# Patient Record
Sex: FEMALE | Race: WHITE | Employment: OTHER | URBAN - METROPOLITAN AREA
[De-identification: names, ages, dates, MRNs, and addresses within clinical notes are randomized per-mention and may not be internally consistent; named-entity substitution may affect disease eponyms.]

---

## 2017-08-28 ENCOUNTER — OFFICE VISIT (OUTPATIENT)
Dept: ORTHOPEDIC SURGERY | Age: 82
End: 2017-08-28

## 2017-08-28 VITALS
SYSTOLIC BLOOD PRESSURE: 129 MMHG | DIASTOLIC BLOOD PRESSURE: 71 MMHG | HEART RATE: 56 BPM | WEIGHT: 184 LBS | BODY MASS INDEX: 33.86 KG/M2 | HEIGHT: 62 IN

## 2017-08-28 DIAGNOSIS — M54.50 LUMBAR SPINE PAIN: Primary | ICD-10-CM

## 2017-08-28 DIAGNOSIS — M70.61 TROCHANTERIC BURSITIS OF RIGHT HIP: ICD-10-CM

## 2017-08-28 PROCEDURE — 99213 OFFICE O/P EST LOW 20 MIN: CPT | Performed by: ORTHOPAEDIC SURGERY

## 2017-08-28 PROCEDURE — 72110 X-RAY EXAM L-2 SPINE 4/>VWS: CPT | Performed by: ORTHOPAEDIC SURGERY

## 2017-08-28 RX ORDER — METHYLPREDNISOLONE 4 MG/1
TABLET ORAL
Qty: 1 KIT | Refills: 0 | Status: SHIPPED | OUTPATIENT
Start: 2017-08-28 | End: 2017-09-03

## 2017-09-01 ENCOUNTER — HOSPITAL ENCOUNTER (OUTPATIENT)
Dept: PHYSICAL THERAPY | Age: 82
Discharge: HOME | End: 2017-09-30
Attending: ORTHOPAEDIC SURGERY | Admitting: ORTHOPAEDIC SURGERY

## 2017-09-06 ENCOUNTER — HOSPITAL ENCOUNTER (OUTPATIENT)
Dept: PHYSICAL THERAPY | Age: 82
Discharge: HOME | End: 2017-09-06
Admitting: ORTHOPAEDIC SURGERY

## 2017-09-06 ENCOUNTER — HOSPITAL ENCOUNTER (OUTPATIENT)
Dept: PHYSICAL THERAPY | Age: 82
Discharge: HOME | End: 2017-08-31
Admitting: ORTHOPAEDIC SURGERY

## 2017-09-13 ENCOUNTER — HOSPITAL ENCOUNTER (OUTPATIENT)
Dept: PHYSICAL THERAPY | Age: 82
Discharge: HOME | End: 2017-09-13
Admitting: ORTHOPAEDIC SURGERY

## 2017-09-20 ENCOUNTER — HOSPITAL ENCOUNTER (OUTPATIENT)
Dept: PHYSICAL THERAPY | Age: 82
Discharge: HOME | End: 2017-09-20
Admitting: ORTHOPAEDIC SURGERY

## 2017-09-27 ENCOUNTER — HOSPITAL ENCOUNTER (OUTPATIENT)
Dept: PHYSICAL THERAPY | Age: 82
Discharge: HOME | End: 2017-09-27
Admitting: ORTHOPAEDIC SURGERY

## 2017-10-11 ENCOUNTER — HOSPITAL ENCOUNTER (OUTPATIENT)
Dept: SURGERY | Age: 82
Discharge: HOME | End: 2017-10-11
Attending: OPHTHALMOLOGY | Admitting: OPHTHALMOLOGY

## 2017-10-11 VITALS
HEART RATE: 63 BPM | OXYGEN SATURATION: 92 % | SYSTOLIC BLOOD PRESSURE: 162 MMHG | BODY MASS INDEX: 34.23 KG/M2 | TEMPERATURE: 97.9 F | WEIGHT: 186 LBS | HEIGHT: 62 IN | RESPIRATION RATE: 16 BRPM | DIASTOLIC BLOOD PRESSURE: 83 MMHG

## 2017-10-11 RX ORDER — MORPHINE SULFATE 2 MG/ML
2 INJECTION, SOLUTION INTRAMUSCULAR; INTRAVENOUS EVERY 5 MIN PRN
Status: DISCONTINUED | OUTPATIENT
Start: 2017-10-11 | End: 2017-10-12 | Stop reason: HOSPADM

## 2017-10-11 RX ORDER — LIDOCAINE HYDROCHLORIDE 10 MG/ML
0.3 INJECTION, SOLUTION EPIDURAL; INFILTRATION; INTRACAUDAL; PERINEURAL
Status: ACTIVE | OUTPATIENT
Start: 2017-10-11 | End: 2017-10-11

## 2017-10-11 RX ORDER — SODIUM CHLORIDE 0.9 % (FLUSH) 0.9 %
10 SYRINGE (ML) INJECTION PRN
Status: DISCONTINUED | OUTPATIENT
Start: 2017-10-11 | End: 2017-10-12 | Stop reason: HOSPADM

## 2017-10-11 RX ORDER — SODIUM CHLORIDE 0.9 % (FLUSH) 0.9 %
10 SYRINGE (ML) INJECTION EVERY 12 HOURS SCHEDULED
Status: DISCONTINUED | OUTPATIENT
Start: 2017-10-11 | End: 2017-10-12 | Stop reason: HOSPADM

## 2017-10-11 RX ORDER — LABETALOL HYDROCHLORIDE 5 MG/ML
5 INJECTION, SOLUTION INTRAVENOUS EVERY 10 MIN PRN
Status: DISCONTINUED | OUTPATIENT
Start: 2017-10-11 | End: 2017-10-12 | Stop reason: HOSPADM

## 2017-10-11 RX ORDER — DIPHENHYDRAMINE HYDROCHLORIDE 50 MG/ML
12.5 INJECTION INTRAMUSCULAR; INTRAVENOUS
Status: ACTIVE | OUTPATIENT
Start: 2017-10-11 | End: 2017-10-11

## 2017-10-11 RX ORDER — MORPHINE SULFATE 2 MG/ML
1 INJECTION, SOLUTION INTRAMUSCULAR; INTRAVENOUS EVERY 5 MIN PRN
Status: DISCONTINUED | OUTPATIENT
Start: 2017-10-11 | End: 2017-10-12 | Stop reason: HOSPADM

## 2017-10-11 RX ORDER — MEPERIDINE HYDROCHLORIDE 50 MG/ML
12.5 INJECTION INTRAMUSCULAR; INTRAVENOUS; SUBCUTANEOUS EVERY 5 MIN PRN
Status: DISCONTINUED | OUTPATIENT
Start: 2017-10-11 | End: 2017-10-12 | Stop reason: HOSPADM

## 2017-10-11 RX ORDER — SODIUM CHLORIDE, SODIUM LACTATE, POTASSIUM CHLORIDE, CALCIUM CHLORIDE 600; 310; 30; 20 MG/100ML; MG/100ML; MG/100ML; MG/100ML
INJECTION, SOLUTION INTRAVENOUS CONTINUOUS
Status: DISCONTINUED | OUTPATIENT
Start: 2017-10-11 | End: 2017-10-12 | Stop reason: HOSPADM

## 2017-10-11 RX ORDER — OXYCODONE HYDROCHLORIDE AND ACETAMINOPHEN 5; 325 MG/1; MG/1
2 TABLET ORAL PRN
Status: ACTIVE | OUTPATIENT
Start: 2017-10-11 | End: 2017-10-11

## 2017-10-11 RX ORDER — HYDRALAZINE HYDROCHLORIDE 20 MG/ML
5 INJECTION INTRAMUSCULAR; INTRAVENOUS EVERY 10 MIN PRN
Status: DISCONTINUED | OUTPATIENT
Start: 2017-10-11 | End: 2017-10-12 | Stop reason: HOSPADM

## 2017-10-11 RX ORDER — PROMETHAZINE HYDROCHLORIDE 25 MG/ML
6.25 INJECTION, SOLUTION INTRAMUSCULAR; INTRAVENOUS
Status: ACTIVE | OUTPATIENT
Start: 2017-10-11 | End: 2017-10-11

## 2017-10-11 RX ORDER — OXYCODONE HYDROCHLORIDE AND ACETAMINOPHEN 5; 325 MG/1; MG/1
1 TABLET ORAL PRN
Status: ACTIVE | OUTPATIENT
Start: 2017-10-11 | End: 2017-10-11

## 2017-10-11 RX ORDER — ONDANSETRON 2 MG/ML
4 INJECTION INTRAMUSCULAR; INTRAVENOUS
Status: ACTIVE | OUTPATIENT
Start: 2017-10-11 | End: 2017-10-11

## 2017-10-11 RX ADMIN — SODIUM CHLORIDE, SODIUM LACTATE, POTASSIUM CHLORIDE, CALCIUM CHLORIDE: 600; 310; 30; 20 INJECTION, SOLUTION INTRAVENOUS at 10:30

## 2017-10-11 ASSESSMENT — ENCOUNTER SYMPTOMS: SHORTNESS OF BREATH: 1

## 2017-10-11 ASSESSMENT — PAIN - FUNCTIONAL ASSESSMENT: PAIN_FUNCTIONAL_ASSESSMENT: 0-10

## 2017-10-11 ASSESSMENT — PAIN SCALES - GENERAL: PAINLEVEL_OUTOF10: 0

## 2017-10-11 NOTE — BRIEF OP NOTE
Brief Postoperative Note    Jay Carpenter  YOB: 1930  [id number]    Pre-operative Diagnosis: CATARACT OD    Post-operative Diagnosis: Same    Procedure: PHACO OD WITH IOL    Anesthesia: MAC    Surgeons/Assistants: Nida Officer MD    Estimated Blood Loss: less than 50     Complications: None    Specimens: Was Not Obtained    Findings: NONE    Electronically signed by Helena Salgado MD on 10/11/2017 at 12:09 PM

## 2017-10-11 NOTE — H&P
I have examined the patient and reviewed the history and physical and find no relevant changes. I have reviewed with the patient and/or family the risks, benefits, and alternatives to the procedure and they have agreed to proceed.     Marcela Martinez.

## 2017-10-11 NOTE — ANESTHESIA PRE-OP
(ZOFRAN ODT) 4 MG disintegrating tablet Take 1 tablet by mouth every 8 hours as needed for Nausea or Vomiting 10/18/16   Guy Kee NP   nitroGLYCERIN (NITROSTAT) 0.4 MG SL tablet Place 0.4 mg under the tongue every 5 minutes as needed (never  used). Historical Provider, MD   meclizine (ANTIVERT) 25 MG CHEW Take 25 mg by mouth 3 times daily as needed (not used recently). Historical Provider, MD       Current medications:    Current Outpatient Prescriptions   Medication Sig Dispense Refill    Cholecalciferol (VITAMIN D) 2000 units CAPS capsule Take by mouth Daily      cefUROXime (CEFTIN) 250 MG tablet Take 250 mg by mouth 2 times daily For UTI      atorvastatin (LIPITOR) 20 MG tablet Take 20 mg by mouth daily      carvedilol (COREG) 6.25 MG tablet Take 6.25 mg by mouth 2 times daily (with meals)      valsartan (DIOVAN) 40 MG tablet Take 40 mg by mouth daily      Probiotic Product (DIGESTIVE ADVANTAGE) CAPS Take 1 capsule by mouth daily      Cyanocobalamin (VITAMIN B 12 PO) Take 1,000 Units by mouth daily       furosemide (LASIX) 20 MG tablet Take 20 mg by mouth daily      allopurinol (ZYLOPRIM) 100 MG tablet Take 100 mg by mouth daily      docusate sodium (COLACE) 100 MG capsule Take 100 mg by mouth 2 times daily      spironolactone (ALDACTONE) 25 MG tablet Take 25 mg by mouth daily.  diazepam (VALIUM) 5 MG tablet Take 5 mg by mouth every 8 hours as needed for Anxiety (never used)       levothyroxine (SYNTHROID) 100 MCG tablet Take 100 mcg by mouth daily.  aspirin 81 MG EC tablet Take 81 mg by mouth daily as needed (stopped 7 days prior to surgery).  estradiol (ESTRACE) 0.1 MG/GM vaginal cream Place 1 g vaginally Twice a Week       ondansetron (ZOFRAN ODT) 4 MG disintegrating tablet Take 1 tablet by mouth every 8 hours as needed for Nausea or Vomiting 10 tablet 0    nitroGLYCERIN (NITROSTAT) 0.4 MG SL tablet Place 0.4 mg under the tongue every 5 minutes as needed (never  used). History:        Procedure Laterality Date    APPENDECTOMY      BREAST SURGERY      biopsy benign    CARDIAC SURGERY  2007    stent    COLONOSCOPY  4/29/2013    HYSTERECTOMY      JOINT REPLACEMENT Left 8/13/14    LEFT TOTAL KNEE REPLACEMENT WITH FEMORAL NERVE BLOCK FOR PAIN    OTHER SURGICAL HISTORY Right 1/6/14    right total knee replacement    TONSILLECTOMY         Social History:    Social History   Substance Use Topics    Smoking status: Former Smoker     Packs/day: 0.50     Years: 40.00     Types: Cigarettes     Quit date: 7/29/2000    Smokeless tobacco: Never Used      Comment: less than pack day    Alcohol use No                                Counseling given: Not Answered      Vital Signs (Current):   Vitals:    10/11/17 1026   BP: (!) 140/59   Pulse: 60   Resp: 18   Temp: 97.8 °F (36.6 °C)   TempSrc: Temporal   SpO2: 95%   Weight: 186 lb (84.4 kg)   Height: 5' 2\" (1.575 m)                                              BP Readings from Last 3 Encounters:   10/11/17 (!) 140/59   08/28/17 129/71   12/09/16 (!) 152/83       NPO Status: Time of last liquid consumption: 2100                        Time of last solid consumption: 2000                        Date of last liquid consumption: 10/10/17                        Date of last solid food consumption: 10/10/17    BMI:   Wt Readings from Last 3 Encounters:   10/11/17 186 lb (84.4 kg)   10/06/17 184 lb (83.5 kg)   08/28/17 184 lb (83.5 kg)     Body mass index is 34.02 kg/m².     Anesthesia Evaluation    Airway: Mallampati: III  TM distance: <3 FB   Neck ROM: limited  Mouth opening: > = 3 FB Dental: normal exam         Pulmonary:   (+) shortness of breath:                             Cardiovascular:    (+) hypertension:, past MI: > 6 months and no interval change, CAD:, CHF:, KITCHEN:,     (-)  angina       Beta Blocker:  Dose within 24 Hrs         Neuro/Psych:               GI/Hepatic/Renal: neg ROS            Endo/Other:    (+) hypothyroidism::.                 Abdominal:           Vascular:                                      Anesthesia Plan      general     ASA 3     (Risks, benefits and alternatives of GA discussed with pt. Questions answered. Willing to proceed.)  Induction: intravenous. Anesthetic plan and risks discussed with patient.                       Ahsan Lovelace MD   10/11/2017

## 2017-10-11 NOTE — ANESTHESIA POST-OP
Postoperative Anesthesia Note    Name:    Burns Riedel  MRN:      [id number]    Patient Vitals for the past 12 hrs:   BP Temp Temp src Pulse Resp SpO2 Height Weight   10/11/17 1026 (!) 140/59 97.8 °F (36.6 °C) Temporal 60 18 95 % 5' 2\" (1.575 m) 186 lb (84.4 kg)        LABS:    CBC  Lab Results   Component Value Date/Time    WBC 6.9 12/08/2016 06:05 PM    HGB 11.3 (L) 12/08/2016 06:05 PM    HCT 34.6 (L) 12/08/2016 06:05 PM     12/08/2016 06:05 PM     RENAL  Lab Results   Component Value Date/Time     12/08/2016 06:05 PM    K 4.3 12/08/2016 06:05 PM     12/08/2016 06:05 PM    CO2 23 12/08/2016 06:05 PM    BUN 30 (H) 12/08/2016 06:05 PM    CREATININE 1.6 (H) 12/08/2016 06:05 PM    GLUCOSE 101 (H) 12/08/2016 06:05 PM     COAGS  Lab Results   Component Value Date/Time    PROTIME 11.8 10/18/2016 04:00 PM    INR 1.03 10/18/2016 04:00 PM    APTT 30.9 10/18/2016 04:00 PM       Intake & Output:  No intake/output data recorded. Nausea & Vomiting:  No    Level of Consciousness:  Awake    Pain Assessment:  Adequate analgesia    Anesthesia Complications:  No apparent anesthetic complications    SUMMARY      Vital signs stable  OK to discharge from Stage I post anesthesia care.   Care transferred from Anesthesiology department on discharge from perioperative area

## 2017-10-11 NOTE — OP NOTE
[address] [address]                               OPERATIVE REPORT    PATIENT NAME: Melida Ochoa                    :             1930  MED REC NO:   [id number]                           ROOM:  ACCOUNT NO:   [de-identified]                           ADMISSION DATE:  10/11/2017  PROVIDER:     Dion Clark MD    DATE OF PROCEDURE:  10/11/2017      PREOPERATIVE DIAGNOSIS:  Cataract, right eye. POSTOPERATIVE DIAGNOSIS:  Cataract, right eye. OPERATION:  Phacoemulsification of the cataract of the right eye with  implant. ANESTHESIA:  General / Monitored Anesthesia Care / Retrobulbar. A 2  mL retrobulbar block and 10 mL modified Roxie block using a 1:1  mixture of 0.75% Marcaine, 4% Xylocaine with epinephrine, and  hyaluronidase. SURGICAL INDICATIONS:  The patient has had a painless progressive loss  of vision due to the cataractous degeneration of the lens and for that  reason, surgery is indicated. The patient is having visual problems  with current lifestyle. A new eyeglasses prescription was unable to  adequately improve functional vision. DETAILS OF PROCEDURE:  The patient was taken to the operating room and  was prepped and draped in the usual manner after obtaining  satisfactory local anesthesia. Attention was turned towards the operative eye and a lid speculum was  inserted. A 2.5 mm keratome was used to make a limbal incision at 180  degrees. Viscoat was then placed in the eye to fill the anterior  chamber and a side port incision was made with a Superblade through  the clear cornea. The incision was located about 2 o'clock to the  left of the original incision. A bent needle was then used to make a  cut in the anterior capsule and start a capsulorrhexis tear. This was  then grasped with Utrata forceps and a 360 degree tear was completed.    [place] dissection was then done with BSS to separate the capsule and  the cataract. The cataract was seen to be spinning easily within the  capsular bag and at this point, the phacoemulsification hand piece was  called for. Using a divide and conquer technique, the phacoemulsifier cut the lens  into four pieces approximately following the pattern of a cross. The  grooves were cut deeply and then the lens was cracked along these  axes. The lens quarters were then rotated into the mid pupillary  space and phacoemulsified. The IA hand piece removed all the cortical  material.  A Loki Prose was used to polish the posterior capsule. Viscoat and Provisc were used to fill the capsular bag. The incision  was opened slightly with a crescent knife and an Maurice acrylic  foldable lens of the appropriate power was called for. The lens was  loaded into the injector and injected into the eye. The lead haptic  was injected into the capsular bag with the trailing haptic left in  the pupillary space. Using a Sinskey hook, the lens was gently nudged  into the capsular bag and rotated into position. After noting that  the alignment and centration was adequate, the IA hand piece was  called for and used to remove all the viscoelastic material.  Miostat  was then injected through the side port incision to bring the pupil  down. The wound was checked to make sure it was water tight. At this  time, 2-3 drops of timolol and 2-3 drops of Vigamox were placed on the  eye. The eye was taped closed, patched and shielded. The patient  went to the recovery room in good condition. ADDENDUM:  The phacoemulsification time was 12.88 CDE with 200 mL of  fluid. The patient had a near-clear no-stitch surgery, axis 180. Tolerated the procedure well and went to the recovery room in good  condition.         Josué Steiner MD    D: 10/11/2017 12:18:51       T: 10/11/2017 12:44:24     [id number]  Job#: [id number]     Doc#: [id number]

## 2017-11-01 ENCOUNTER — HOSPITAL ENCOUNTER (OUTPATIENT)
Dept: SURGERY | Age: 82
Discharge: HOME | End: 2017-11-01
Attending: OPHTHALMOLOGY | Admitting: OPHTHALMOLOGY

## 2017-11-01 VITALS
DIASTOLIC BLOOD PRESSURE: 83 MMHG | BODY MASS INDEX: 33.86 KG/M2 | TEMPERATURE: 97 F | HEIGHT: 62 IN | SYSTOLIC BLOOD PRESSURE: 171 MMHG | WEIGHT: 184 LBS | OXYGEN SATURATION: 95 % | HEART RATE: 57 BPM | RESPIRATION RATE: 16 BRPM

## 2017-11-01 RX ORDER — LABETALOL HYDROCHLORIDE 5 MG/ML
5 INJECTION, SOLUTION INTRAVENOUS EVERY 10 MIN PRN
Status: DISCONTINUED | OUTPATIENT
Start: 2017-11-01 | End: 2017-11-02 | Stop reason: HOSPADM

## 2017-11-01 RX ORDER — OXYCODONE HYDROCHLORIDE AND ACETAMINOPHEN 5; 325 MG/1; MG/1
1 TABLET ORAL PRN
Status: ACTIVE | OUTPATIENT
Start: 2017-11-01 | End: 2017-11-01

## 2017-11-01 RX ORDER — HYDRALAZINE HYDROCHLORIDE 20 MG/ML
5 INJECTION INTRAMUSCULAR; INTRAVENOUS EVERY 10 MIN PRN
Status: DISCONTINUED | OUTPATIENT
Start: 2017-11-01 | End: 2017-11-02 | Stop reason: HOSPADM

## 2017-11-01 RX ORDER — MORPHINE SULFATE 2 MG/ML
1 INJECTION, SOLUTION INTRAMUSCULAR; INTRAVENOUS EVERY 5 MIN PRN
Status: DISCONTINUED | OUTPATIENT
Start: 2017-11-01 | End: 2017-11-02 | Stop reason: HOSPADM

## 2017-11-01 RX ORDER — OXYCODONE HYDROCHLORIDE AND ACETAMINOPHEN 5; 325 MG/1; MG/1
2 TABLET ORAL PRN
Status: ACTIVE | OUTPATIENT
Start: 2017-11-01 | End: 2017-11-01

## 2017-11-01 RX ORDER — ONDANSETRON 2 MG/ML
4 INJECTION INTRAMUSCULAR; INTRAVENOUS PRN
Status: DISCONTINUED | OUTPATIENT
Start: 2017-11-01 | End: 2017-11-02 | Stop reason: HOSPADM

## 2017-11-01 RX ORDER — SODIUM CHLORIDE, SODIUM LACTATE, POTASSIUM CHLORIDE, CALCIUM CHLORIDE 600; 310; 30; 20 MG/100ML; MG/100ML; MG/100ML; MG/100ML
INJECTION, SOLUTION INTRAVENOUS CONTINUOUS
Status: DISCONTINUED | OUTPATIENT
Start: 2017-11-01 | End: 2017-11-02 | Stop reason: HOSPADM

## 2017-11-01 RX ORDER — MORPHINE SULFATE 2 MG/ML
2 INJECTION, SOLUTION INTRAMUSCULAR; INTRAVENOUS EVERY 5 MIN PRN
Status: DISCONTINUED | OUTPATIENT
Start: 2017-11-01 | End: 2017-11-02 | Stop reason: HOSPADM

## 2017-11-01 RX ORDER — PROMETHAZINE HYDROCHLORIDE 25 MG/ML
6.25 INJECTION, SOLUTION INTRAMUSCULAR; INTRAVENOUS
Status: DISCONTINUED | OUTPATIENT
Start: 2017-11-01 | End: 2017-11-02 | Stop reason: HOSPADM

## 2017-11-01 RX ORDER — DIPHENHYDRAMINE HYDROCHLORIDE 50 MG/ML
12.5 INJECTION INTRAMUSCULAR; INTRAVENOUS
Status: ACTIVE | OUTPATIENT
Start: 2017-11-01 | End: 2017-11-01

## 2017-11-01 RX ORDER — MEPERIDINE HYDROCHLORIDE 50 MG/ML
12.5 INJECTION INTRAMUSCULAR; INTRAVENOUS; SUBCUTANEOUS EVERY 5 MIN PRN
Status: DISCONTINUED | OUTPATIENT
Start: 2017-11-01 | End: 2017-11-02 | Stop reason: HOSPADM

## 2017-11-01 RX ADMIN — SODIUM CHLORIDE, SODIUM LACTATE, POTASSIUM CHLORIDE, CALCIUM CHLORIDE: 600; 310; 30; 20 INJECTION, SOLUTION INTRAVENOUS at 10:45

## 2017-11-01 ASSESSMENT — PAIN - FUNCTIONAL ASSESSMENT: PAIN_FUNCTIONAL_ASSESSMENT: 0-10

## 2017-11-01 ASSESSMENT — ENCOUNTER SYMPTOMS: SHORTNESS OF BREATH: 1

## 2017-11-01 NOTE — ANESTHESIA POST-OP
Postoperative Anesthesia Note    Name:    Liam Miller  MRN:      [id number]    Patient Vitals for the past 12 hrs:   BP Temp Temp src Pulse Resp SpO2 Height Weight   11/01/17 1208 (!) 171/83 97 °F (36.1 °C) - 57 16 95 % - -   11/01/17 1025 (!) 154/55 98 °F (36.7 °C) Temporal 56 16 94 % 5' 1.5\" (1.562 m) 184 lb (83.5 kg)        LABS:    CBC  Lab Results   Component Value Date/Time    WBC 6.9 12/08/2016 06:05 PM    HGB 11.3 (L) 12/08/2016 06:05 PM    HCT 34.6 (L) 12/08/2016 06:05 PM     12/08/2016 06:05 PM     RENAL  Lab Results   Component Value Date/Time     12/08/2016 06:05 PM    K 4.3 12/08/2016 06:05 PM     12/08/2016 06:05 PM    CO2 23 12/08/2016 06:05 PM    BUN 30 (H) 12/08/2016 06:05 PM    CREATININE 1.6 (H) 12/08/2016 06:05 PM    GLUCOSE 101 (H) 12/08/2016 06:05 PM     COAGS  Lab Results   Component Value Date/Time    PROTIME 11.8 10/18/2016 04:00 PM    INR 1.03 10/18/2016 04:00 PM    APTT 30.9 10/18/2016 04:00 PM       Intake & Output:  In: 250 [I.V.:250]  Out: -     Nausea & Vomiting:  No    Level of Consciousness:  Awake    Pain Assessment:  Adequate analgesia    Anesthesia Complications:  No apparent anesthetic complications    SUMMARY      Vital signs stable  OK to discharge from Stage I post anesthesia care.   Care transferred from Anesthesiology department on discharge from perioperative area

## 2017-11-01 NOTE — ANESTHESIA PRE-OP
Provider, MD   meclizine (ANTIVERT) 25 MG CHEW Take 25 mg by mouth 3 times daily as needed (not used recently). Historical Provider, MD   levothyroxine (SYNTHROID) 100 MCG tablet Take 100 mcg by mouth daily. Historical Provider, MD   aspirin 81 MG EC tablet Take 81 mg by mouth daily as needed (stopped 7 days prior to surgery). Historical Provider, MD       Current medications:    Current Outpatient Prescriptions   Medication Sig Dispense Refill    Cholecalciferol (VITAMIN D) 2000 units CAPS capsule Take by mouth Daily      cefUROXime (CEFTIN) 250 MG tablet Take 250 mg by mouth 2 times daily For UTI      atorvastatin (LIPITOR) 20 MG tablet Take 20 mg by mouth daily      carvedilol (COREG) 6.25 MG tablet Take 6.25 mg by mouth 2 times daily (with meals)      valsartan (DIOVAN) 40 MG tablet Take 40 mg by mouth daily      Probiotic Product (DIGESTIVE ADVANTAGE) CAPS Take 1 capsule by mouth daily      estradiol (ESTRACE) 0.1 MG/GM vaginal cream Place 1 g vaginally Twice a Week       ondansetron (ZOFRAN ODT) 4 MG disintegrating tablet Take 1 tablet by mouth every 8 hours as needed for Nausea or Vomiting 10 tablet 0    Cyanocobalamin (VITAMIN B 12 PO) Take 1,000 Units by mouth daily       furosemide (LASIX) 20 MG tablet Take 20 mg by mouth daily      allopurinol (ZYLOPRIM) 100 MG tablet Take 100 mg by mouth daily      docusate sodium (COLACE) 100 MG capsule Take 100 mg by mouth 2 times daily      spironolactone (ALDACTONE) 25 MG tablet Take 25 mg by mouth daily.  nitroGLYCERIN (NITROSTAT) 0.4 MG SL tablet Place 0.4 mg under the tongue every 5 minutes as needed (never  used).  diazepam (VALIUM) 5 MG tablet Take 5 mg by mouth every 8 hours as needed for Anxiety (never used)       meclizine (ANTIVERT) 25 MG CHEW Take 25 mg by mouth 3 times daily as needed (not used recently).  levothyroxine (SYNTHROID) 100 MCG tablet Take 100 mcg by mouth daily.         aspirin 81 MG EC tablet Take 81 mg by mouth daily as needed (stopped 7 days prior to surgery). Current Facility-Administered Medications   Medication Dose Route Frequency Provider Last Rate Last Dose    lactated ringers infusion   Intravenous Continuous Rory Wynn MD        lidocaine 1 % (PF) injection 0.1 mL  0.1 mL Intradermal Once PRN Rory Wynn MD        bupivacaine 0.75%, phenylephrine 10%, tropicamide 1%, cyclopentolate 1%, moxifloxacin 0.5%, ketorolac 0.5% in lidocaine 2% jelly ophthalmic solution  0.3 mL Left Eye Q10 Min PRN Pamela Melgoza MD        bupivacaine 0.75% and lidocaine 2% in hylenex injection (10.5 mL)   Intraocular Once Pamela Melgoza MD        bupivacaine 0.75% and lidocaine 2% in hylenex injection (4.5 mL)   Intraocular Once Pamela Melgoza MD           Allergies:     Allergies   Allergen Reactions    Ace Inhibitors Other (See Comments)     cough    Amoxicillin Hives     Hives    Naproxen Nausea Only     Nausea and stomach pain    Statins Other (See Comments)     myalgia  unknown    Hydrochlorothiazide Rash    Sulfa Antibiotics Rash     rash  Blistering rash       Problem List:    Patient Active Problem List   Diagnosis Code    Anginal pain (HCC) I20.9    CAD (coronary artery disease) I25.10    Dyspnea on exertion R06.09    Primary localized osteoarthrosis, lower leg M17.10    Left TKR Z96.659    HTN (hypertension) I10    Hyperlipidemia E78.5    Right TKR Z96.659    Trochanteric bursitis of right hip M70.61       Past Medical History:        Diagnosis Date    Arthritis     CAD (coronary artery disease)     stent placed 2007    CHF [facility]est[facility] failure) ([facility] [address]     Depression 1980    treated with electoshock successfully    Diphtheria     age 15   [facility] Gout     Hyperlipemia     Hypertension     STEMI (ST elevation myocardial infarction) ([facility] [address] 2007    Thyroid disease     Wears hearing aid     left ear       Past Surgical History:        Procedure Laterality Date  APPENDECTOMY      BREAST SURGERY      biopsy benign    CARDIAC SURGERY  2007    stent    CATARACT REMOVAL WITH IMPLANT Right 10/11/2017    COLONOSCOPY  4/29/2013    HYSTERECTOMY      JOINT REPLACEMENT Left 8/13/14    LEFT TOTAL KNEE REPLACEMENT WITH FEMORAL NERVE BLOCK FOR PAIN    OTHER SURGICAL HISTORY Right 1/6/14    right total knee replacement    TONSILLECTOMY         Social History:    Social History   Substance Use Topics    Smoking status: Former Smoker     Packs/day: 0.50     Years: 40.00     Types: Cigarettes     Quit date: 7/29/2000    Smokeless tobacco: Never Used      Comment: less than pack day    Alcohol use No                                Counseling given: Not Answered      Vital Signs (Current): There were no vitals filed for this visit. BP Readings from Last 3 Encounters:   10/11/17 (!) 162/83   08/28/17 129/71   12/09/16 (!) 152/83       NPO Status:                                                                                 BMI:   Wt Readings from Last 3 Encounters:   10/26/17 186 lb (84.4 kg)   10/11/17 186 lb (84.4 kg)   10/06/17 184 lb (83.5 kg)     There is no height or weight on file to calculate BMI. Anesthesia Evaluation  Patient summary reviewed and Nursing notes reviewed no history of anesthetic complications:   Airway: Mallampati: II     Neck ROM: full   Dental:          Pulmonary:negative ROS and normal exam    (+) shortness of breath:                             Cardiovascular:negative ROS    (+) hypertension:, past MI:, CAD:, CABG/stent (stent):, CHF:, KITCHEN:, hyperlipidemia    (-)  angina                Neuro/Psych:   {neg ROS  (+) psychiatric history:            GI/Hepatic/Renal: neg ROS       (-) hiatal hernia and GERD       Endo/Other: negative ROS   (+) : arthritis:.                  Abdominal:           Vascular:                                    Anesthesia Plan      general     ASA 3     (I discussed with the patient the risks and benefits of PIV, general anesthesia, IV Narcotics, PACU. All questions were answered the patient agrees with the plan.)  Induction: intravenous. Pre-Operative Diagnosis: CATARACT LEFT  EYE    80 y.o.   BMI:  Body mass index is 34.2 kg/m².      Vitals:    11/01/17 1025   BP: (!) 154/55   Pulse: 56   Resp: 16   Temp: 98 °F (36.7 °C)   TempSrc: Temporal   SpO2: 94%   Weight: 184 lb (83.5 kg)   Height: 5' 1.5\" (1.562 m)       Allergies   Allergen Reactions    Ace Inhibitors Other (See Comments)     cough    Amoxicillin Hives     Hives    Naproxen Nausea Only     Nausea and stomach pain    Statins Other (See Comments)     myalgia  unknown    Hydrochlorothiazide Rash    Sulfa Antibiotics Rash     rash  Blistering rash       Social History   Substance Use Topics    Smoking status: Former Smoker     Packs/day: 0.50     Years: 40.00     Types: Cigarettes     Quit date: 7/29/2000    Smokeless tobacco: Never Used      Comment: less than pack day    Alcohol use No       LABS:    CBC  Lab Results   Component Value Date/Time    WBC 6.9 12/08/2016 06:05 PM    HGB 11.3 (L) 12/08/2016 06:05 PM    HCT 34.6 (L) 12/08/2016 06:05 PM     12/08/2016 06:05 PM     RENAL  Lab Results   Component Value Date/Time     12/08/2016 06:05 PM    K 4.3 12/08/2016 06:05 PM     12/08/2016 06:05 PM    CO2 23 12/08/2016 06:05 PM    BUN 30 (H) 12/08/2016 06:05 PM    CREATININE 1.6 (H) 12/08/2016 06:05 PM    GLUCOSE 101 (H) 12/08/2016 06:05 PM     COAGS  Lab Results   Component Value Date/Time    PROTIME 11.8 10/18/2016 04:00 PM    INR 1.03 10/18/2016 04:00 PM    APTT 30.9 10/18/2016 04:00 PM         Kerline Jay MD   11/1/2017

## 2017-11-01 NOTE — OP NOTE
the capsule and  the cataract. The cataract was seen to be spinning easily within the  capsular bag and at this point, the phacoemulsification hand piece was  called for. Using a divide and conquer technique, the phacoemulsifier cut the lens  into four pieces approximately following the pattern of a cross. The  grooves were cut deeply and then the lens was cracked along these  axes. The lens quarters were then rotated into the mid pupillary  space and phacoemulsified. The IA hand piece removed all the cortical  material.  A Demarcus Muta was used to polish the posterior capsule. Viscoat and Provisc were used to fill the capsular bag. The incision  was opened slightly with a crescent knife and an Maurice acrylic  foldable lens of the appropriate power was called for. The lens was  loaded into the injector and injected into the eye. The lead haptic  was injected into the capsular bag with the trailing haptic left in  the pupillary space. Using a Sinskey hook, the lens was gently nudged  into the capsular bag and rotated into position. After noting that  the alignment and centration was adequate, the IA hand piece was  called for and used to remove all the viscoelastic material.  Miostat  was then injected through the side port incision to bring the pupil  down. The wound was checked to make sure it was water tight. At this  time, 2-3 drops of timolol and 2-3 drops of Vigamox were placed on the  eye. The eye was taped closed, patched and shielded. The patient  went to the recovery room in good condition. ADDENDUM:  The phacoemulsification time was 12.20 CDE with 200 mL of  fluid. The patient had a near-clear, no-stitch surgery, axis 180. Tolerated the procedure well and went to the recovery room in good  condition.         Anton Smith MD    D: 11/01/2017 12:21:38       T: 11/01/2017 13:19:14     [id number]  Job#: [id number]     Doc#: [id number]

## 2018-04-04 ENCOUNTER — HOSPITAL ENCOUNTER (OUTPATIENT)
Dept: SURGERY | Age: 83
Discharge: HOME | End: 2018-04-04
Attending: OPHTHALMOLOGY | Admitting: OPHTHALMOLOGY

## 2018-04-04 VITALS
WEIGHT: 184 LBS | DIASTOLIC BLOOD PRESSURE: 62 MMHG | BODY MASS INDEX: 33.86 KG/M2 | HEART RATE: 67 BPM | RESPIRATION RATE: 18 BRPM | SYSTOLIC BLOOD PRESSURE: 131 MMHG | OXYGEN SATURATION: 97 % | HEIGHT: 62 IN

## 2018-04-04 RX ORDER — TOLTERODINE 2 MG/1
2 CAPSULE, EXTENDED RELEASE ORAL
COMMUNITY

## 2018-04-04 RX ORDER — PROPARACAINE HYDROCHLORIDE 5 MG/ML
1 SOLUTION/ DROPS OPHTHALMIC
Status: COMPLETED | OUTPATIENT
Start: 2018-04-04 | End: 2018-04-04

## 2018-04-04 RX ORDER — METHENAMINE MANDELATE 1000 MG/1
1 TABLET, FILM COATED ORAL 2 TIMES DAILY
COMMUNITY

## 2018-04-04 RX ORDER — PREDNISOLONE ACETATE 10 MG/ML
1 SUSPENSION/ DROPS OPHTHALMIC
Status: COMPLETED | OUTPATIENT
Start: 2018-04-04 | End: 2018-04-04

## 2018-04-04 RX ORDER — TROPICAMIDE 10 MG/ML
1 SOLUTION/ DROPS OPHTHALMIC EVERY 5 MIN PRN
Status: DISCONTINUED | OUTPATIENT
Start: 2018-04-04 | End: 2018-04-05 | Stop reason: HOSPADM

## 2018-04-04 RX ORDER — PHENYLEPHRINE HCL 2.5 %
1 DROPS OPHTHALMIC (EYE) EVERY 5 MIN PRN
Status: DISCONTINUED | OUTPATIENT
Start: 2018-04-04 | End: 2018-04-05 | Stop reason: HOSPADM

## 2018-04-04 RX ADMIN — TROPICAMIDE 1 DROP: 10 SOLUTION/ DROPS OPHTHALMIC at 13:56

## 2018-04-04 RX ADMIN — Medication 1 DROP: at 13:56

## 2018-04-04 RX ADMIN — TROPICAMIDE 1 DROP: 10 SOLUTION/ DROPS OPHTHALMIC at 13:50

## 2018-04-04 RX ADMIN — PROPARACAINE HYDROCHLORIDE 1 DROP: 5 SOLUTION/ DROPS OPHTHALMIC at 14:23

## 2018-04-04 RX ADMIN — Medication 1 DROP: at 13:49

## 2018-04-04 RX ADMIN — PREDNISOLONE ACETATE 1 DROP: 10 SUSPENSION/ DROPS OPHTHALMIC at 14:26

## 2018-04-18 ENCOUNTER — HOSPITAL ENCOUNTER (OUTPATIENT)
Dept: SURGERY | Age: 83
Discharge: HOME | End: 2018-04-18
Attending: OPHTHALMOLOGY | Admitting: OPHTHALMOLOGY

## 2018-04-18 VITALS
OXYGEN SATURATION: 94 % | HEART RATE: 64 BPM | DIASTOLIC BLOOD PRESSURE: 52 MMHG | RESPIRATION RATE: 18 BRPM | SYSTOLIC BLOOD PRESSURE: 106 MMHG | BODY MASS INDEX: 33.86 KG/M2 | HEIGHT: 62 IN | WEIGHT: 184 LBS

## 2018-04-18 RX ORDER — PROPARACAINE HYDROCHLORIDE 5 MG/ML
1 SOLUTION/ DROPS OPHTHALMIC
Status: COMPLETED | OUTPATIENT
Start: 2018-04-18 | End: 2018-04-18

## 2018-04-18 RX ORDER — PREDNISOLONE ACETATE 10 MG/ML
1 SUSPENSION/ DROPS OPHTHALMIC
Status: COMPLETED | OUTPATIENT
Start: 2018-04-18 | End: 2018-04-18

## 2018-04-18 RX ORDER — TROPICAMIDE 10 MG/ML
1 SOLUTION/ DROPS OPHTHALMIC EVERY 5 MIN PRN
Status: DISCONTINUED | OUTPATIENT
Start: 2018-04-18 | End: 2018-04-19 | Stop reason: HOSPADM

## 2018-04-18 RX ORDER — PHENYLEPHRINE HCL 2.5 %
1 DROPS OPHTHALMIC (EYE) EVERY 5 MIN PRN
Status: DISCONTINUED | OUTPATIENT
Start: 2018-04-18 | End: 2018-04-19 | Stop reason: HOSPADM

## 2018-04-18 RX ADMIN — TROPICAMIDE 1 DROP: 10 SOLUTION/ DROPS OPHTHALMIC at 13:50

## 2018-04-18 RX ADMIN — Medication 1 DROP: at 13:55

## 2018-04-18 RX ADMIN — TROPICAMIDE 1 DROP: 10 SOLUTION/ DROPS OPHTHALMIC at 13:55

## 2018-04-18 RX ADMIN — Medication 1 DROP: at 13:48

## 2018-04-18 RX ADMIN — PREDNISOLONE ACETATE 1 DROP: 10 SUSPENSION/ DROPS OPHTHALMIC at 14:32

## 2018-04-18 RX ADMIN — PROPARACAINE HYDROCHLORIDE 1 DROP: 5 SOLUTION/ DROPS OPHTHALMIC at 14:28

## 2018-08-05 ENCOUNTER — HOSPITAL ENCOUNTER (INPATIENT)
Age: 83
LOS: 3 days | Discharge: NURSING, REHAB OR LONG-TERM CARE | DRG: 158 | End: 2018-08-08
Attending: EMERGENCY MEDICINE | Admitting: INTERNAL MEDICINE
Payer: MEDICARE

## 2018-08-05 ENCOUNTER — APPOINTMENT (OUTPATIENT)
Dept: CT IMAGING | Age: 83
DRG: 158 | End: 2018-08-05
Payer: MEDICARE

## 2018-08-05 ENCOUNTER — APPOINTMENT (OUTPATIENT)
Dept: GENERAL RADIOLOGY | Age: 83
DRG: 158 | End: 2018-08-05
Payer: MEDICARE

## 2018-08-05 DIAGNOSIS — S22.43XA CLOSED FRACTURE OF MULTIPLE RIBS OF BOTH SIDES, INITIAL ENCOUNTER: ICD-10-CM

## 2018-08-05 DIAGNOSIS — S09.90XA INJURY OF HEAD, INITIAL ENCOUNTER: ICD-10-CM

## 2018-08-05 DIAGNOSIS — R55 SYNCOPE AND COLLAPSE: Primary | ICD-10-CM

## 2018-08-05 LAB
A/G RATIO: 1 (ref 1.1–2.2)
ALBUMIN SERPL-MCNC: 4 G/DL (ref 3.4–5)
ALP BLD-CCNC: 64 U/L (ref 40–129)
ALT SERPL-CCNC: 18 U/L (ref 10–40)
ANION GAP SERPL CALCULATED.3IONS-SCNC: 10 MMOL/L (ref 3–16)
AST SERPL-CCNC: 21 U/L (ref 15–37)
BASOPHILS ABSOLUTE: 0 K/UL (ref 0–0.2)
BASOPHILS RELATIVE PERCENT: 0.4 %
BILIRUB SERPL-MCNC: 0.3 MG/DL (ref 0–1)
BUN BLDV-MCNC: 27 MG/DL (ref 7–20)
CALCIUM SERPL-MCNC: 9.6 MG/DL (ref 8.3–10.6)
CHLORIDE BLD-SCNC: 99 MMOL/L (ref 99–110)
CO2: 23 MMOL/L (ref 21–32)
CREAT SERPL-MCNC: 1.2 MG/DL (ref 0.6–1.2)
EOSINOPHILS ABSOLUTE: 0.1 K/UL (ref 0–0.6)
EOSINOPHILS RELATIVE PERCENT: 0.7 %
GFR AFRICAN AMERICAN: 51
GFR NON-AFRICAN AMERICAN: 42
GLOBULIN: 3.9 G/DL
GLUCOSE BLD-MCNC: 120 MG/DL (ref 70–99)
HCT VFR BLD CALC: 36.3 % (ref 36–48)
HEMOGLOBIN: 12.3 G/DL (ref 12–16)
INR BLD: 1.1 (ref 0.86–1.14)
LYMPHOCYTES ABSOLUTE: 1.4 K/UL (ref 1–5.1)
LYMPHOCYTES RELATIVE PERCENT: 16.8 %
MCH RBC QN AUTO: 31.1 PG (ref 26–34)
MCHC RBC AUTO-ENTMCNC: 33.9 G/DL (ref 31–36)
MCV RBC AUTO: 91.9 FL (ref 80–100)
MONOCYTES ABSOLUTE: 0.7 K/UL (ref 0–1.3)
MONOCYTES RELATIVE PERCENT: 8.4 %
NEUTROPHILS ABSOLUTE: 6.1 K/UL (ref 1.7–7.7)
NEUTROPHILS RELATIVE PERCENT: 73.7 %
PDW BLD-RTO: 13.9 % (ref 12.4–15.4)
PLATELET # BLD: 189 K/UL (ref 135–450)
PMV BLD AUTO: 8.9 FL (ref 5–10.5)
POTASSIUM SERPL-SCNC: 4.4 MMOL/L (ref 3.5–5.1)
PROTHROMBIN TIME: 12.5 SEC (ref 9.8–13)
RBC # BLD: 3.96 M/UL (ref 4–5.2)
SODIUM BLD-SCNC: 132 MMOL/L (ref 136–145)
SPECIMEN STATUS: NORMAL
TOTAL PROTEIN: 7.9 G/DL (ref 6.4–8.2)
TROPONIN: <0.01 NG/ML
WBC # BLD: 8.3 K/UL (ref 4–11)

## 2018-08-05 PROCEDURE — 6360000002 HC RX W HCPCS: Performed by: EMERGENCY MEDICINE

## 2018-08-05 PROCEDURE — 1200000000 HC SEMI PRIVATE

## 2018-08-05 PROCEDURE — 6360000002 HC RX W HCPCS: Performed by: INTERNAL MEDICINE

## 2018-08-05 PROCEDURE — 6370000000 HC RX 637 (ALT 250 FOR IP): Performed by: INTERNAL MEDICINE

## 2018-08-05 PROCEDURE — 71250 CT THORAX DX C-: CPT

## 2018-08-05 PROCEDURE — 99285 EMERGENCY DEPT VISIT HI MDM: CPT

## 2018-08-05 PROCEDURE — 94150 VITAL CAPACITY TEST: CPT

## 2018-08-05 PROCEDURE — 80053 COMPREHEN METABOLIC PANEL: CPT

## 2018-08-05 PROCEDURE — 94664 DEMO&/EVAL PT USE INHALER: CPT

## 2018-08-05 PROCEDURE — 74176 CT ABD & PELVIS W/O CONTRAST: CPT

## 2018-08-05 PROCEDURE — 85025 COMPLETE CBC W/AUTO DIFF WBC: CPT

## 2018-08-05 PROCEDURE — 72125 CT NECK SPINE W/O DYE: CPT

## 2018-08-05 PROCEDURE — 70450 CT HEAD/BRAIN W/O DYE: CPT

## 2018-08-05 PROCEDURE — 85610 PROTHROMBIN TIME: CPT

## 2018-08-05 PROCEDURE — 84484 ASSAY OF TROPONIN QUANT: CPT

## 2018-08-05 PROCEDURE — 93010 ELECTROCARDIOGRAM REPORT: CPT | Performed by: INTERNAL MEDICINE

## 2018-08-05 PROCEDURE — 6370000000 HC RX 637 (ALT 250 FOR IP): Performed by: EMERGENCY MEDICINE

## 2018-08-05 PROCEDURE — 93005 ELECTROCARDIOGRAM TRACING: CPT | Performed by: EMERGENCY MEDICINE

## 2018-08-05 PROCEDURE — 6370000000 HC RX 637 (ALT 250 FOR IP): Performed by: NURSE PRACTITIONER

## 2018-08-05 PROCEDURE — 76376 3D RENDER W/INTRP POSTPROCES: CPT

## 2018-08-05 PROCEDURE — 96375 TX/PRO/DX INJ NEW DRUG ADDON: CPT

## 2018-08-05 PROCEDURE — 2580000003 HC RX 258: Performed by: EMERGENCY MEDICINE

## 2018-08-05 PROCEDURE — 96361 HYDRATE IV INFUSION ADD-ON: CPT

## 2018-08-05 PROCEDURE — 36415 COLL VENOUS BLD VENIPUNCTURE: CPT

## 2018-08-05 PROCEDURE — 96374 THER/PROPH/DIAG INJ IV PUSH: CPT

## 2018-08-05 PROCEDURE — 2580000003 HC RX 258: Performed by: INTERNAL MEDICINE

## 2018-08-05 RX ORDER — ESTRADIOL 0.1 MG/G
1 CREAM VAGINAL
Status: DISCONTINUED | OUTPATIENT
Start: 2018-08-06 | End: 2018-08-06

## 2018-08-05 RX ORDER — ACETAMINOPHEN 325 MG/1
650 TABLET ORAL ONCE
Status: COMPLETED | OUTPATIENT
Start: 2018-08-05 | End: 2018-08-05

## 2018-08-05 RX ORDER — TRAMADOL HYDROCHLORIDE 50 MG/1
100 TABLET ORAL EVERY 6 HOURS PRN
Status: DISCONTINUED | OUTPATIENT
Start: 2018-08-05 | End: 2018-08-06

## 2018-08-05 RX ORDER — SPIRONOLACTONE 25 MG/1
25 TABLET ORAL DAILY
Status: DISCONTINUED | OUTPATIENT
Start: 2018-08-05 | End: 2018-08-05

## 2018-08-05 RX ORDER — LOSARTAN POTASSIUM 25 MG/1
25 TABLET ORAL DAILY
Status: DISCONTINUED | OUTPATIENT
Start: 2018-08-05 | End: 2018-08-07

## 2018-08-05 RX ORDER — SODIUM CHLORIDE 0.9 % (FLUSH) 0.9 %
10 SYRINGE (ML) INJECTION PRN
Status: DISCONTINUED | OUTPATIENT
Start: 2018-08-05 | End: 2018-08-08 | Stop reason: HOSPADM

## 2018-08-05 RX ORDER — LEVOTHYROXINE SODIUM 0.1 MG/1
100 TABLET ORAL DAILY
Status: DISCONTINUED | OUTPATIENT
Start: 2018-08-06 | End: 2018-08-08 | Stop reason: HOSPADM

## 2018-08-05 RX ORDER — NITROGLYCERIN 0.4 MG/1
0.4 TABLET SUBLINGUAL EVERY 5 MIN PRN
Status: DISCONTINUED | OUTPATIENT
Start: 2018-08-05 | End: 2018-08-08 | Stop reason: HOSPADM

## 2018-08-05 RX ORDER — CARVEDILOL 6.25 MG/1
6.25 TABLET ORAL 2 TIMES DAILY WITH MEALS
Status: DISCONTINUED | OUTPATIENT
Start: 2018-08-05 | End: 2018-08-08 | Stop reason: HOSPADM

## 2018-08-05 RX ORDER — METHENAMINE MANDELATE 500 MG/1
1 TABLET ORAL 2 TIMES DAILY
Status: DISCONTINUED | OUTPATIENT
Start: 2018-08-05 | End: 2018-08-06

## 2018-08-05 RX ORDER — ASPIRIN 81 MG/1
81 TABLET ORAL DAILY PRN
Status: DISCONTINUED | OUTPATIENT
Start: 2018-08-05 | End: 2018-08-06

## 2018-08-05 RX ORDER — ATORVASTATIN CALCIUM 40 MG/1
40 TABLET, FILM COATED ORAL NIGHTLY
Status: DISCONTINUED | OUTPATIENT
Start: 2018-08-05 | End: 2018-08-05

## 2018-08-05 RX ORDER — ATORVASTATIN CALCIUM 20 MG/1
20 TABLET, FILM COATED ORAL DAILY
COMMUNITY

## 2018-08-05 RX ORDER — CEFDINIR 300 MG/1
300 CAPSULE ORAL EVERY 12 HOURS SCHEDULED
Status: DISCONTINUED | OUTPATIENT
Start: 2018-08-05 | End: 2018-08-07

## 2018-08-05 RX ORDER — ONDANSETRON 2 MG/ML
4 INJECTION INTRAMUSCULAR; INTRAVENOUS ONCE
Status: COMPLETED | OUTPATIENT
Start: 2018-08-05 | End: 2018-08-05

## 2018-08-05 RX ORDER — SODIUM CHLORIDE 0.9 % (FLUSH) 0.9 %
3 SYRINGE (ML) INJECTION EVERY 8 HOURS
Status: DISCONTINUED | OUTPATIENT
Start: 2018-08-05 | End: 2018-08-05 | Stop reason: ALTCHOICE

## 2018-08-05 RX ORDER — MORPHINE SULFATE 2 MG/ML
1 INJECTION, SOLUTION INTRAMUSCULAR; INTRAVENOUS
Status: DISCONTINUED | OUTPATIENT
Start: 2018-08-05 | End: 2018-08-06

## 2018-08-05 RX ORDER — TRAMADOL HYDROCHLORIDE 50 MG/1
50 TABLET ORAL EVERY 6 HOURS PRN
Status: DISCONTINUED | OUTPATIENT
Start: 2018-08-05 | End: 2018-08-06

## 2018-08-05 RX ORDER — 0.9 % SODIUM CHLORIDE 0.9 %
500 INTRAVENOUS SOLUTION INTRAVENOUS ONCE
Status: COMPLETED | OUTPATIENT
Start: 2018-08-05 | End: 2018-08-05

## 2018-08-05 RX ORDER — VALSARTAN 80 MG/1
40 TABLET ORAL DAILY
Status: DISCONTINUED | OUTPATIENT
Start: 2018-08-05 | End: 2018-08-05 | Stop reason: RX

## 2018-08-05 RX ORDER — OXYCODONE HYDROCHLORIDE AND ACETAMINOPHEN 5; 325 MG/1; MG/1
1 TABLET ORAL EVERY 4 HOURS PRN
Status: DISCONTINUED | OUTPATIENT
Start: 2018-08-05 | End: 2018-08-05

## 2018-08-05 RX ORDER — ATORVASTATIN CALCIUM 10 MG/1
20 TABLET, FILM COATED ORAL NIGHTLY
Status: DISCONTINUED | OUTPATIENT
Start: 2018-08-06 | End: 2018-08-05

## 2018-08-05 RX ORDER — SODIUM CHLORIDE 0.9 % (FLUSH) 0.9 %
10 SYRINGE (ML) INJECTION EVERY 12 HOURS SCHEDULED
Status: DISCONTINUED | OUTPATIENT
Start: 2018-08-05 | End: 2018-08-08 | Stop reason: HOSPADM

## 2018-08-05 RX ORDER — ATORVASTATIN CALCIUM 10 MG/1
20 TABLET, FILM COATED ORAL NIGHTLY
Status: DISCONTINUED | OUTPATIENT
Start: 2018-08-05 | End: 2018-08-08 | Stop reason: HOSPADM

## 2018-08-05 RX ORDER — MECLIZINE HCL 12.5 MG/1
25 TABLET ORAL 3 TIMES DAILY PRN
Status: DISCONTINUED | OUTPATIENT
Start: 2018-08-05 | End: 2018-08-08 | Stop reason: HOSPADM

## 2018-08-05 RX ORDER — MORPHINE SULFATE 2 MG/ML
2 INJECTION, SOLUTION INTRAMUSCULAR; INTRAVENOUS ONCE
Status: COMPLETED | OUTPATIENT
Start: 2018-08-05 | End: 2018-08-05

## 2018-08-05 RX ORDER — ONDANSETRON 2 MG/ML
4 INJECTION INTRAMUSCULAR; INTRAVENOUS EVERY 6 HOURS PRN
Status: DISCONTINUED | OUTPATIENT
Start: 2018-08-05 | End: 2018-08-08 | Stop reason: HOSPADM

## 2018-08-05 RX ORDER — OXYCODONE HYDROCHLORIDE AND ACETAMINOPHEN 5; 325 MG/1; MG/1
2 TABLET ORAL EVERY 4 HOURS PRN
Status: DISCONTINUED | OUTPATIENT
Start: 2018-08-05 | End: 2018-08-05

## 2018-08-05 RX ADMIN — ATORVASTATIN CALCIUM 20 MG: 10 TABLET, FILM COATED ORAL at 23:05

## 2018-08-05 RX ADMIN — Medication 10 ML: at 21:22

## 2018-08-05 RX ADMIN — MORPHINE SULFATE 2 MG: 2 INJECTION, SOLUTION INTRAMUSCULAR; INTRAVENOUS at 15:05

## 2018-08-05 RX ADMIN — SODIUM CHLORIDE 500 ML: 9 INJECTION, SOLUTION INTRAVENOUS at 15:05

## 2018-08-05 RX ADMIN — CEFDINIR 300 MG: 300 CAPSULE ORAL at 21:52

## 2018-08-05 RX ADMIN — ONDANSETRON 4 MG: 2 INJECTION, SOLUTION INTRAMUSCULAR; INTRAVENOUS at 15:05

## 2018-08-05 RX ADMIN — ACETAMINOPHEN 650 MG: 325 TABLET, FILM COATED ORAL at 15:05

## 2018-08-05 RX ADMIN — CARVEDILOL 6.25 MG: 6.25 TABLET, FILM COATED ORAL at 21:21

## 2018-08-05 RX ADMIN — ENOXAPARIN SODIUM 40 MG: 100 INJECTION SUBCUTANEOUS at 21:21

## 2018-08-05 ASSESSMENT — PAIN DESCRIPTION - LOCATION: LOCATION: RIB CAGE

## 2018-08-05 ASSESSMENT — PAIN SCALES - GENERAL
PAINLEVEL_OUTOF10: 0
PAINLEVEL_OUTOF10: 0
PAINLEVEL_OUTOF10: 6

## 2018-08-05 ASSESSMENT — PAIN DESCRIPTION - PAIN TYPE: TYPE: ACUTE PAIN

## 2018-08-05 NOTE — ED NOTES
Bedside report to Berny Montalvo, from [facility]. Tele box placed by this RN and verified with [facility].      Juanita Rajan RN  08/05/18 [id number]

## 2018-08-05 NOTE — ED PROVIDER NOTES
headache, focal weakness or sensory changes   Endocrine:  Denies polyuria or polydypsia   Lymphatic:  Denies swollen glands   Psychiatric:  Denies depression, suicidal ideation or homicidal ideation   All systems negative except as marked. Positives and Pertinent negatives as per HPI. Except as noted above in the ROS, all other systems were reviewed and negative. PAST MEDICAL HISTORY     Past Medical History:   Diagnosis Date    Arthritis     CAD (coronary artery disease)     stent placed 2007    [facility]est[facility] [address]     Depression 1980    treated with electoshock successfully    Diphtheria     age 15   Edrie Lama Gout     Hyperlipemia     Hypertension     STEMI (ST elevation myocardial infarction) ([facility] [address] 2007    Thyroid disease     Wears hearing aid     left ear         SURGICAL HISTORY       Past Surgical History:   Procedure Laterality Date    APPENDECTOMY      BREAST SURGERY      biopsy benign    CARDIAC SURGERY  2007    stent    CATARACT REMOVAL WITH IMPLANT Right 10/11/2017    COLONOSCOPY  4/29/2013    HYSTERECTOMY      JOINT REPLACEMENT Left 8/13/14    LEFT TOTAL KNEE REPLACEMENT WITH FEMORAL NERVE BLOCK FOR PAIN    OTHER SURGICAL HISTORY Right 1/6/14    right total knee replacement    TONSILLECTOMY           CURRENT MEDICATIONS       Current Discharge Medication List      CONTINUE these medications which have NOT CHANGED    Details   Acetaminophen 325 MG CAPS Take 650 mg by mouth 3 times daily      hydrocortisone (ANUSOL-HC) 2.5 % rectal cream Place rectally 2 times daily Place rectally 2 times daily.       psyllium (KONSYL) 28.3 % PACK Take 1 packet by mouth daily      tolterodine (DETROL LA) 2 MG extended release capsule Take 2 mg by mouth Daily with supper      methenamine (MANDELAMINE) 1 g tablet Take 1 g by mouth 2 times daily      Cholecalciferol (VITAMIN D) 2000 units CAPS capsule Take by mouth Daily      carvedilol (COREG) 6.25 MG tablet Take 6.25 mg by mouth 2 0.7 %    Basophils % 0.4 %    Neutrophils # 6.1 1.7 - 7.7 K/uL    Lymphocytes # 1.4 1.0 - 5.1 K/uL    Monocytes # 0.7 0.0 - 1.3 K/uL    Eosinophils # 0.1 0.0 - 0.6 K/uL    Basophils # 0.0 0.0 - 0.2 K/uL   Comprehensive Metabolic Panel   Result Value Ref Range    Sodium 132 (L) 136 - 145 mmol/L    Potassium 4.4 3.5 - 5.1 mmol/L    Chloride 99 99 - 110 mmol/L    CO2 23 21 - 32 mmol/L    Anion Gap 10 3 - 16    Glucose 120 (H) 70 - 99 mg/dL    BUN 27 (H) 7 - 20 mg/dL    CREATININE 1.2 0.6 - 1.2 mg/dL    GFR Non-African American 42 (A) >60    GFR  51 (A) >60    Calcium 9.6 8.3 - 10.6 mg/dL    Total Protein 7.9 6.4 - 8.2 g/dL    Alb 4.0 3.4 - 5.0 g/dL    Albumin/Globulin Ratio 1.0 (L) 1.1 - 2.2    Total Bilirubin 0.3 0.0 - 1.0 mg/dL    Alkaline Phosphatase 64 40 - 129 U/L    ALT 18 10 - 40 U/L    AST 21 15 - 37 U/L    Globulin 3.9 g/dL   Protime-INR   Result Value Ref Range    Protime 12.5 9.8 - 13.0 sec    INR 1.10 0.86 - 1.14   Troponin   Result Value Ref Range    Troponin <0.01 <0.01 ng/mL   Sample possible blood bank testing   Result Value Ref Range    Specimen Status BRIEN    Troponin   Result Value Ref Range    Troponin <0.01 <0.01 ng/mL   EKG 12 Lead   Result Value Ref Range    Ventricular Rate 67 BPM    Atrial Rate 67 BPM    P-R Interval 182 ms    QRS Duration 80 ms    Q-T Interval 394 ms    QTc Calculation (Bazett) 416 ms    P Axis 57 degrees    R Axis -20 degrees    T Axis 42 degrees    Diagnosis       Normal sinus rhythm with sinus arrhythmiaLow voltage QRSCannot rule out Anteroseptal infarct (cited on or before 30-DEC-2014)Abnormal ECGWhen compared with ECG of 21-MAY-2018 12:44,No significant change was foundConfirmed by Krystal Gardner MD, Laura De Luna ([id number]) on 8/5/2018 8:57:24 PM       All other labs were within normal range or not returned as of this dictation. EKG:  All EKG's are interpreted by the Emergency Department Physician who either signs or Co-signs this chart in the absence of a cardiologist.    Normal sinus rhythm at 67. Normal axis. . No acute ST-T changes. Similar to prior May 21    RADIOLOGY:   Non-plain film images such as CT, Ultrasound and MRI are read by the radiologist. Plain radiographic images are visualized and preliminarily interpreted by the  ED Provider with the below findings:      Interpretation per the Radiologist below, if available at the time of this note:    CT CHEST [address]   Final Result   Bilateral rib fractures are seen in the chest.  No pneumothorax noted. A few   scattered indeterminate pulmonary nodules are seen. No acute traumatic solid organ injury identified the abdomen or pelvis. Gallstones are seen. In addition, there may be concomitant gallbladder wall   calcification-porcelain gallbladder. CT ABDOMEN PELVIS WO CONTRAST Additional Contrast? None   Final Result   Bilateral rib fractures are seen in the chest.  No pneumothorax noted. A few   scattered indeterminate pulmonary nodules are seen. No acute traumatic solid organ injury identified the abdomen or pelvis. Gallstones are seen. In addition, there may be concomitant gallbladder wall   calcification-porcelain gallbladder. CT LUMBAR RECONSTRUCTION WO POST PROCESS   Preliminary Result   Multilevel degenerative changes of lumbar spine with no definite radiographic   findings to suggest presence of acute fracture. If clinical symptomatology   persists, follow-up MRI examination may be helpful for more complete   evaluation. CT CERVICAL SPINE WO CONTRAST   Final Result   No acute abnormality of the cervical spine. CT HEAD WO CONTRAST   Final Result   No acute intracranial abnormality.       Diffuse atrophic changes with findings suggesting chronic microvascular   ischemia             Ct Abdomen Pelvis Wo Contrast Additional Contrast? None    Result Date: 8/5/2018  EXAMINATION: CT OF THE CHEST WITHOUT CONTRAST; CT OF THE ABDOMEN AND PELVIS called? ->No Ordering Physician Provided Reason for Exam: LOC; CHEST PAIN; FALL; NEW ONSET A FIB Acuity: Acute Type of Exam: Initial FINDINGS: BRAIN/VENTRICLES: The ventricles and sulci are diffusely enlarged. Low attenuation is seen in the periventricular and subcortical white matter. No acute intracranial hemorrhage or acute infarct is identified. ORBITS: The visualized portion of the orbits demonstrate no acute abnormality. SINUSES: The visualized paranasal sinuses and mastoid air cells demonstrate no acute abnormality. SOFT TISSUES/SKULL:  No acute abnormality of the visualized skull or soft tissues. No acute intracranial abnormality. Diffuse atrophic changes with findings suggesting chronic microvascular ischemia     Ct Chest Wo Contrast    Result Date: 8/5/2018  EXAMINATION: CT OF THE CHEST WITHOUT CONTRAST; CT OF THE ABDOMEN AND PELVIS WITHOUT CONTRAST 8/5/2018 4:02 pm TECHNIQUE: CT of the chest was performed without the administration of intravenous contrast. Multiplanar reformatted images are provided for review. Dose modulation, iterative reconstruction, and/or weight based adjustment of the mA/kV was utilized to reduce the radiation dose to as low as reasonably achievable.; CT of the abdomen and pelvis was performed without the administration of intravenous contrast. Multiplanar reformatted images are provided for review. Dose modulation, iterative reconstruction, and/or weight based adjustment of the mA/kV was utilized to reduce the radiation dose to as low as reasonably achievable.  COMPARISON: None HISTORY: ORDERING SYSTEM PROVIDED HISTORY: chest pain, syncope, fall TECHNOLOGIST PROVIDED HISTORY: Ordering Physician Provided Reason for Exam: LOC; CHEST PAIN; FALL; NEW ONSET A FIB Acuity: Acute Type of Exam: Initial; ORDERING SYSTEM PROVIDED HISTORY: chest pain, syncope, fall TECHNOLOGIST PROVIDED HISTORY: Additional Contrast?->None Ordering Physician Provided Reason for Exam: LOC; CHEST PAIN; FALL; enchondroma. Bilateral rib fractures are seen in the chest.  No pneumothorax noted. A few scattered indeterminate pulmonary nodules are seen. No acute traumatic solid organ injury identified the abdomen or pelvis. Gallstones are seen. In addition, there may be concomitant gallbladder wall calcification-porcelain gallbladder. Ct Cervical Spine Wo Contrast    Result Date: 8/5/2018  EXAMINATION: CT OF THE CERVICAL SPINE WITHOUT CONTRAST 8/5/2018 3:48 pm TECHNIQUE: CT of the cervical spine was performed without the administration of intravenous contrast. Multiplanar reformatted images are provided for review. Dose modulation, iterative reconstruction, and/or weight based adjustment of the mA/kV was utilized to reduce the radiation dose to as low as reasonably achievable. COMPARISON: None. HISTORY: ORDERING SYSTEM PROVIDED HISTORY: fall, neck pain TECHNOLOGIST PROVIDED HISTORY: Ordering Physician Provided Reason for Exam: LOC; CHEST PAIN; FALL; NEW ONSET A FIB Acuity: Acute Type of Exam: Initial FINDINGS: BONES/ALIGNMENT: There is no evidence of an acute cervical spine fracture. There is normal alignment of the cervical spine. DEGENERATIVE CHANGES: Multilevel degenerative changes. SOFT TISSUES: There is no prevertebral soft tissue swelling. No acute abnormality of the cervical spine. Ct Lumbar Reconstruction Wo Post Process    Result Date: 8/5/2018  EXAMINATION: CT OF THE LUMBAR SPINE WITHOUT CONTRAST  8/5/2018 TECHNIQUE: CT of the lumbar spine was performed without the administration of intravenous contrast. Multiplanar reformatted images are provided for review. Dose modulation, iterative reconstruction, and/or weight based adjustment of the mA/kV was utilized to reduce the radiation dose to as low as reasonably achievable.  COMPARISON: None HISTORY: ORDERING SYSTEM PROVIDED HISTORY: fall, midline pain TECHNOLOGIST PROVIDED HISTORY: Reason for exam:->fall, midline pain Ordering Physician Provided Reason for Exam: FALL; BACK PAIN; DR REQUESTED RECONSTRUCTED IMAGES OUT OF ABD/PELVIS SCAN Acuity: Acute Type of Exam: Initial FINDINGS: BONES/ALIGNMENT: There is minimal anterolisthesis of L4 on L5 and L5 on S1 which is favored to be degenerative in nature. Alignment is otherwise unremarkable. Lumbar vertebral body heights are well maintained. Tiny endplate irregularities at several levels favored to be related to Schmorl's nodes. No definite acute fracture is identified. DEGENERATIVE CHANGES: There are degenerative changes of the lower thoracic/lumbar spine. In the lower thoracic region, degenerative spondylosis is most pronounced anteriorly at the T10-T11 level. No significant disc space narrowing is identified in the lumbar region. There are degenerative changes of the lower lumbar apophyseal joints. At the L5-S1 level, mild disc bulge and degenerative facet disease results in [facility] bilateral neural foraminal narrowing, right more so than left. There are minimal degenerative changes of the sacroiliac joints, right slightly more so than left. Tiny sclerotic density in the lateral aspect of the left side of the sacrum (image 51) could represent a bone island. SOFT TISSUES/RETROPERITONEUM: No paraspinal mass is seen. There are atherosclerotic changes of the aorta. There is minimal ectasia of the infrarenal abdominal aorta which measures approximately 2.3 cm in maximum transverse diameter. Calcifications in the mesenteric fat of the right lower quadrant could represent calcified lymph nodes. There are diverticula of the sigmoid colon. Multilevel degenerative changes of lumbar spine with no definite radiographic findings to suggest presence of acute fracture. If clinical symptomatology persists, follow-up MRI examination may be helpful for more complete evaluation.          PROCEDURES   Unless otherwise noted below, none     Procedures    CRITICAL CARE TIME   N/A    CONSULTS:  IP CONSULT TO HOSPITALIST    EMERGENCY DEPARTMENT COURSE and DIFFERENTIAL DIAGNOSIS/MDM:   Vitals:    Vitals:    08/05/18 1709 08/05/18 1831 08/05/18 1841 08/05/18 2048   BP: 126/82 119/64 (!) 146/75    Pulse: 72 67 63    Resp: 17 18 20    Temp:  98.3 °F (36.8 °C) 98.8 °F (37.1 °C)    TempSrc:  Oral Oral    SpO2: 90% 95% 94% 96%   Weight:       Height:           Joellen Sheffield is a 80 y.o. female who presented to the Emergency Department with Syncopal episode and fall with multiple rib fractures and chin contusion. Case was discussed with the hospitalist who accepts his service. She does not have any evidence of pulmonary contusion or hypoxia at this time. Of note she does have suspected sleep apnea per her daughter.   She does not use a CPAP or BiPAP    Etiology of her syncope will be worked up in the hospital.    Patient was given the following medications:  Medications   aspirin EC tablet 81 mg (not administered)   carvedilol (COREG) tablet 6.25 mg (6.25 mg Oral Given 8/5/18 2121)   levothyroxine (SYNTHROID) tablet 100 mcg (not administered)   meclizine (ANTIVERT) tablet 25 mg (not administered)   nitroGLYCERIN (NITROSTAT) SL tablet 0.4 mg (not administered)   spironolactone (ALDACTONE) tablet 25 mg (25 mg Oral Not Given 8/5/18 2121)   sodium chloride flush 0.9 % injection 10 mL (10 mLs Intravenous Given 8/5/18 2122)   sodium chloride flush 0.9 % injection 10 mL (not administered)   magnesium hydroxide (MILK OF MAGNESIA) 400 MG/5ML suspension 30 mL (not administered)   ondansetron (ZOFRAN) injection 4 mg (not administered)   enoxaparin (LOVENOX) injection 40 mg (40 mg Subcutaneous Given 8/5/18 2121)   methenamine (MANDELAMINE) tablet 1 g (not administered)   estradiol (ESTRACE) vaginal cream 1 g (not administered)   morphine injection 1 mg (not administered)   oxyCODONE-acetaminophen (PERCOCET) 5-325 MG per tablet 1 tablet (not administered)     Or   oxyCODONE-acetaminophen (PERCOCET) 5-325 MG per tablet 2 tablet (not administered)   losartan (COZAAR) tablet 25 mg (25 mg Oral Not Given 8/5/18 2121)   hydrocortisone 2.5 % cream ( Topical Not Given 8/5/18 2131)   cefdinir (OMNICEF) capsule 300 mg (not administered)   acetaminophen (TYLENOL) tablet 650 mg (650 mg Oral Given 8/5/18 1505)   0.9 % sodium chloride bolus (0 mLs Intravenous Stopped 8/5/18 1705)   morphine injection 2 mg (2 mg Intravenous Given 8/5/18 1505)   ondansetron (ZOFRAN) injection 4 mg (4 mg Intravenous Given 8/5/18 1505)       The patient tolerated their visit well. The patient and / or the family were informed of the results of any tests, a time was given to answer questions. FINAL IMPRESSION      1. Syncope and collapse    2. Injury of head, initial encounter    3.  Closed fracture of multiple ribs of both sides, initial encounter          DISPOSITION/PLAN   DISPOSITION Decision To Admit 08/05/2018 06:02:55 PM      PATIENT REFERRED TO:  Sergei Evans PA-C  [place] [id number] [phone]            DISCHARGE MEDICATIONS:  Current Discharge Medication List          DISCONTINUED MEDICATIONS:  Current Discharge Medication List                 (Please note that portions of this note were completed with a voice recognition program.  Efforts were made to edit the dictations but occasionally words are mis-transcribed.)    Hollis Villa DO (electronically signed)            Hollis Villa DO  08/05/18 [id number]

## 2018-08-06 ENCOUNTER — APPOINTMENT (OUTPATIENT)
Dept: ULTRASOUND IMAGING | Age: 83
DRG: 158 | End: 2018-08-06
Payer: MEDICARE

## 2018-08-06 LAB
A/G RATIO: 0.9 (ref 1.1–2.2)
ALBUMIN SERPL-MCNC: 3.4 G/DL (ref 3.4–5)
ALP BLD-CCNC: 57 U/L (ref 40–129)
ALT SERPL-CCNC: 21 U/L (ref 10–40)
ANION GAP SERPL CALCULATED.3IONS-SCNC: 10 MMOL/L (ref 3–16)
AST SERPL-CCNC: 21 U/L (ref 15–37)
BACTERIA: ABNORMAL /HPF
BILIRUB SERPL-MCNC: 0.3 MG/DL (ref 0–1)
BILIRUBIN URINE: NEGATIVE
BLOOD, URINE: NEGATIVE
BUN BLDV-MCNC: 29 MG/DL (ref 7–20)
CALCIUM SERPL-MCNC: 9.3 MG/DL (ref 8.3–10.6)
CHLORIDE BLD-SCNC: 103 MMOL/L (ref 99–110)
CLARITY: CLEAR
CO2: 22 MMOL/L (ref 21–32)
COLOR: YELLOW
CREAT SERPL-MCNC: 1.3 MG/DL (ref 0.6–1.2)
EPITHELIAL CELLS, UA: ABNORMAL /HPF
GFR AFRICAN AMERICAN: 47
GFR NON-AFRICAN AMERICAN: 39
GLOBULIN: 3.8 G/DL
GLUCOSE BLD-MCNC: 95 MG/DL (ref 70–99)
GLUCOSE URINE: NEGATIVE MG/DL
KETONES, URINE: NEGATIVE MG/DL
LEUKOCYTE ESTERASE, URINE: ABNORMAL
LV EF: 50 %
LVEF MODALITY: NORMAL
MICROSCOPIC EXAMINATION: YES
NITRITE, URINE: NEGATIVE
PH UA: 6
POTASSIUM REFLEX MAGNESIUM: 4.7 MMOL/L (ref 3.5–5.1)
PROTEIN UA: 30 MG/DL
RBC UA: ABNORMAL /HPF (ref 0–2)
SODIUM BLD-SCNC: 135 MMOL/L (ref 136–145)
SPECIFIC GRAVITY UA: 1.01
TOTAL PROTEIN: 7.2 G/DL (ref 6.4–8.2)
URINE TYPE: ABNORMAL
UROBILINOGEN, URINE: 0.2 E.U./DL
WBC UA: ABNORMAL /HPF (ref 0–5)

## 2018-08-06 PROCEDURE — 87086 URINE CULTURE/COLONY COUNT: CPT

## 2018-08-06 PROCEDURE — 93306 TTE W/DOPPLER COMPLETE: CPT

## 2018-08-06 PROCEDURE — 97530 THERAPEUTIC ACTIVITIES: CPT

## 2018-08-06 PROCEDURE — 80053 COMPREHEN METABOLIC PANEL: CPT

## 2018-08-06 PROCEDURE — 97116 GAIT TRAINING THERAPY: CPT

## 2018-08-06 PROCEDURE — 97166 OT EVAL MOD COMPLEX 45 MIN: CPT

## 2018-08-06 PROCEDURE — 6370000000 HC RX 637 (ALT 250 FOR IP): Performed by: INTERNAL MEDICINE

## 2018-08-06 PROCEDURE — 36415 COLL VENOUS BLD VENIPUNCTURE: CPT

## 2018-08-06 PROCEDURE — 2580000003 HC RX 258: Performed by: INTERNAL MEDICINE

## 2018-08-06 PROCEDURE — 6370000000 HC RX 637 (ALT 250 FOR IP): Performed by: NURSE PRACTITIONER

## 2018-08-06 PROCEDURE — G8978 MOBILITY CURRENT STATUS: HCPCS

## 2018-08-06 PROCEDURE — 1200000000 HC SEMI PRIVATE

## 2018-08-06 PROCEDURE — 76705 ECHO EXAM OF ABDOMEN: CPT

## 2018-08-06 PROCEDURE — 94761 N-INVAS EAR/PLS OXIMETRY MLT: CPT

## 2018-08-06 PROCEDURE — 97535 SELF CARE MNGMENT TRAINING: CPT

## 2018-08-06 PROCEDURE — 6360000002 HC RX W HCPCS: Performed by: INTERNAL MEDICINE

## 2018-08-06 PROCEDURE — G8987 SELF CARE CURRENT STATUS: HCPCS

## 2018-08-06 PROCEDURE — G8988 SELF CARE GOAL STATUS: HCPCS

## 2018-08-06 PROCEDURE — G8979 MOBILITY GOAL STATUS: HCPCS

## 2018-08-06 PROCEDURE — 2700000000 HC OXYGEN THERAPY PER DAY

## 2018-08-06 PROCEDURE — 81001 URINALYSIS AUTO W/SCOPE: CPT

## 2018-08-06 PROCEDURE — 97162 PT EVAL MOD COMPLEX 30 MIN: CPT

## 2018-08-06 RX ORDER — TRAMADOL HYDROCHLORIDE 50 MG/1
50 TABLET ORAL EVERY 6 HOURS PRN
Status: DISCONTINUED | OUTPATIENT
Start: 2018-08-06 | End: 2018-08-08

## 2018-08-06 RX ORDER — METRONIDAZOLE 250 MG/1
500 TABLET ORAL EVERY 8 HOURS SCHEDULED
Status: DISCONTINUED | OUTPATIENT
Start: 2018-08-06 | End: 2018-08-08

## 2018-08-06 RX ORDER — POLYETHYLENE GLYCOL 3350 17 G/17G
17 POWDER, FOR SOLUTION ORAL DAILY
Status: DISCONTINUED | OUTPATIENT
Start: 2018-08-06 | End: 2018-08-08 | Stop reason: HOSPADM

## 2018-08-06 RX ORDER — ESTRADIOL 0.1 MG/G
1 CREAM VAGINAL
Status: DISCONTINUED | OUTPATIENT
Start: 2018-08-08 | End: 2018-08-08 | Stop reason: HOSPADM

## 2018-08-06 RX ORDER — ASPIRIN 81 MG/1
81 TABLET ORAL DAILY
Status: DISCONTINUED | OUTPATIENT
Start: 2018-08-07 | End: 2018-08-08 | Stop reason: HOSPADM

## 2018-08-06 RX ORDER — LIDOCAINE 50 MG/G
2 PATCH TOPICAL DAILY
Status: DISCONTINUED | OUTPATIENT
Start: 2018-08-06 | End: 2018-08-08 | Stop reason: HOSPADM

## 2018-08-06 RX ADMIN — CEFDINIR 300 MG: 300 CAPSULE ORAL at 09:25

## 2018-08-06 RX ADMIN — ENOXAPARIN SODIUM 40 MG: 100 INJECTION SUBCUTANEOUS at 09:24

## 2018-08-06 RX ADMIN — MORPHINE SULFATE 1 MG: 2 INJECTION, SOLUTION INTRAMUSCULAR; INTRAVENOUS at 08:50

## 2018-08-06 RX ADMIN — TRAMADOL HYDROCHLORIDE 50 MG: 50 TABLET, FILM COATED ORAL at 23:09

## 2018-08-06 RX ADMIN — TRAMADOL HYDROCHLORIDE 50 MG: 50 TABLET, FILM COATED ORAL at 02:21

## 2018-08-06 RX ADMIN — CEFDINIR 300 MG: 300 CAPSULE ORAL at 23:09

## 2018-08-06 RX ADMIN — METRONIDAZOLE 500 MG: 250 TABLET ORAL at 15:23

## 2018-08-06 RX ADMIN — Medication 10 ML: at 08:52

## 2018-08-06 RX ADMIN — ATORVASTATIN CALCIUM 20 MG: 10 TABLET, FILM COATED ORAL at 23:09

## 2018-08-06 RX ADMIN — Medication 10 ML: at 23:15

## 2018-08-06 RX ADMIN — LOSARTAN POTASSIUM 25 MG: 25 TABLET, FILM COATED ORAL at 09:24

## 2018-08-06 RX ADMIN — POLYETHYLENE GLYCOL 3350 17 G: 17 POWDER, FOR SOLUTION ORAL at 15:25

## 2018-08-06 RX ADMIN — HYDROCORTISONE: 25 CREAM TOPICAL at 09:24

## 2018-08-06 RX ADMIN — METRONIDAZOLE 500 MG: 250 TABLET ORAL at 23:09

## 2018-08-06 RX ADMIN — CARVEDILOL 6.25 MG: 6.25 TABLET, FILM COATED ORAL at 09:24

## 2018-08-06 RX ADMIN — CARVEDILOL 6.25 MG: 6.25 TABLET, FILM COATED ORAL at 17:16

## 2018-08-06 RX ADMIN — LEVOTHYROXINE SODIUM 100 MCG: 100 TABLET ORAL at 05:28

## 2018-08-06 ASSESSMENT — PAIN SCALES - GENERAL
PAINLEVEL_OUTOF10: 8
PAINLEVEL_OUTOF10: 9
PAINLEVEL_OUTOF10: 0
PAINLEVEL_OUTOF10: 8
PAINLEVEL_OUTOF10: 8
PAINLEVEL_OUTOF10: 10

## 2018-08-06 ASSESSMENT — PAIN DESCRIPTION - LOCATION
LOCATION: LEG;RIB CAGE
LOCATION: RIB CAGE
LOCATION: RIB CAGE

## 2018-08-06 ASSESSMENT — PAIN DESCRIPTION - DESCRIPTORS
DESCRIPTORS: ACHING
DESCRIPTORS: ACHING

## 2018-08-06 ASSESSMENT — PAIN DESCRIPTION - PAIN TYPE
TYPE: ACUTE PAIN

## 2018-08-06 NOTE — PROGRESS NOTES
Comment: NT formally d/t increased pain, appears at least 3+/ to 4-/5 grossly        Assessment   Performance deficits / Impairments: Decreased functional mobility ; Decreased endurance;Decreased ADL status; Decreased balance;Decreased strength;Decreased high-level IADLs  After evaluation, pt found to be presenting with the above mentioned occupational performance deficits which are affecting participation in daily living skills. Patient admitted with a fall at home with subsequent rib fractures. Today patient required increased time for all tasks including bed mobility (Leif)functional mobility (CGA with RW), toilet transfers(Leif) and donning briefs with maxA. Patient is currently functioning below her baseline and would benefit from SNF at discharge. Pt would benefit from continued skilled occupational therapy to address ADLs, functional mobility, and safety while in acute care. Prognosis: Good  Decision Making: Medium Complexity  Patient Education: ADLs, bed mobility, functional transfers and role of OT  REQUIRES OT FOLLOW UP: Yes  Activity Tolerance  Activity Tolerance: Patient Tolerated treatment well;Patient limited by pain  Activity Tolerance: Vitals after mobility: Bp 173/75, HR 61 O2 97% on RA  Safety Devices  Safety Devices in place: Yes  Type of devices: Left in chair;Call light within reach;Nurse notified; Chair alarm in place;Gait belt         Plan   Plan  Times per week: 3-5x's a week while in acute care    G-Code  OT G-codes  Functional Assessment Tool Used: AM-PAC  Score: 15  Functional Limitation: Self care  Self Care Current Status (): At least 40 percent but less than 60 percent impaired, limited or restricted  Self Care Goal Status ():  At least 20 percent but less than 40 percent impaired, limited or restricted                 AM-PAC Score        AM-[facility] Inpatient Daily Activity Raw Score: 15  AM-PAC Inpatient ADL T-Scale Score : 34.69  ADL Inpatient CMS 0-100% Score: 56.46  ADL Inpatient CMS G-Code Modifier : CK    Goals  Short term goals  Time Frame for Short term goals: 1 week unless otherwise specified  Short term goal 1: Pt. will complete LE dressing with Leif  Short term goal 2: Pt. will complete toilet transfers with SBA by 8/10  Short term goal 3: Pt. will complete standing level ADLs with SBA for balance  Patient Goals   Patient goals : \"to walk so I can go home\"       Therapy Time   Individual Concurrent Group Co-treatment   Time In 0813         Time Out 0850         Minutes 37         Timed Code Treatment Minutes: 27 Minutes       Pablo Anthony OTR/L

## 2018-08-06 NOTE — PLAN OF CARE
Problem: Musculor/Skeletal Functional Status  Intervention: OT Evaluation/treatment  Increase patients ADLs/functional status to baseline.

## 2018-08-06 NOTE — CARE COORDINATION
CASE MANAGEMENT INITIAL ASSESSMENT      Reviewed chart and met with patient today, re: 80year old female. Explained Case Management role/services. Family present: daughter  Primary contact information: Obi Hamilton [phone]    Admit date/status: 8/5/18  Diagnosis: syncope and collapse    Insurance: [id number] required for SNF -yes       3 night stay required -no    Living arrangements, Adls, care needs, prior to admission: gabe lives alone in a senior living apartment [facility]. Transportation: TBD    Durable Medical Equipment at home: Helga Camp has but doesn't always use_Cane__RTS__ BSC__Shower Chair__  02__ HHN__ CPAP__  BiPap__  Hospital Bed__ W/C___ Other__________    Services in the home and/or outpatient, prior to admission: RN to do vitals weekly-daughter can not remember the name of the agency, not ducumented    PT/OT recs: SNF    Hospital Exemption Notification (HEN): not initiated    Barriers to discharge: none    Plan/comments:   Met with daughter at bedside to discuss potential discharge needs. Daughter verbalizing that she feels patient needs to move in with her and will need 24 hour care. Writer explained that the private duty/24 hour care is not covered under insurance. Discussed if she feels she needs the 24 hour care that SNF was recommended and would be an available option. SNF list provided. Daughter states she really liked [facility] although they are now out of network with [facility] and did NOT want patient to return to [facility].   Daughter to review list.      Tacos Qualia on chart for MD signature      Haroon Vallejo, HERVE

## 2018-08-06 NOTE — CARE COORDINATION
Writer attempted to see patient. Patient having echo now. CM will attempt again later today.   Marbella Mcgovern RN

## 2018-08-06 NOTE — PROGRESS NOTES
Admission assessment completed and charted. Neuro Q4 checks completed: per pt decreased sensation in michell toes. Pt very stiff and sore from rib fractures, pt denies any needs to take any pain meds. Per dtr the percocet, she thinks, make pt hallucinate last time she was given them, this RN paged dr to switch. Per pt and dtr takes cefdinir for her tooth abscess, ordered obtained and given. Bed alarm on. Bed locked and in lowest position. Call light within reach. Pt denies any other needs at this time. Will continue to monitor.

## 2018-08-06 NOTE — PROGRESS NOTES
08/05/18 2048   Oxygen Therapy/Pulse Ox   O2 Device None (Room air)   SpO2 96 %   Treatment   Treatment Type IS   Incentive Spirometry Tx   Treatment Tolerance Fair   Incentive Spirometry Goal (mL) 751 mL   Incentive Spirometry Achieved (mL) 600 mL

## 2018-08-06 NOTE — PROGRESS NOTES
Result   Bilateral rib fractures are seen in the chest.  No pneumothorax noted. A few   scattered indeterminate pulmonary nodules are seen. No acute traumatic solid organ injury identified the abdomen or pelvis. Gallstones are seen. In addition, there may be concomitant gallbladder wall   calcification-porcelain gallbladder. CT ABDOMEN PELVIS WO CONTRAST Additional Contrast? None   Final Result   Bilateral rib fractures are seen in the chest.  No pneumothorax noted. A few   scattered indeterminate pulmonary nodules are seen. No acute traumatic solid organ injury identified the abdomen or pelvis. Gallstones are seen. In addition, there may be concomitant gallbladder wall   calcification-porcelain gallbladder. CT LUMBAR RECONSTRUCTION WO POST PROCESS   Preliminary Result   Multilevel degenerative changes of lumbar spine with no definite radiographic   findings to suggest presence of acute fracture. If clinical symptomatology   persists, follow-up MRI examination may be helpful for more complete   evaluation. CT CERVICAL SPINE WO CONTRAST   Final Result   No acute abnormality of the cervical spine. CT HEAD WO CONTRAST   Final Result   No acute intracranial abnormality. Diffuse atrophic changes with findings suggesting chronic microvascular   ischemia                 Assessment/Plan:    Active Hospital Problems    Diagnosis Date Noted    Syncope and collapse [R55] 08/05/2018    HTN (hypertension) [I10] 01/06/2015    CAD (coronary artery disease) [I25.10] 10/09/2011       \"87 y.o. female who presented to [facility] with rib pain. Pt had complete syncopal episode today. Had LOC and fell forward on a chair and hit her anterior chest and chin. Pt has had decreased po intake after being treated for dental infection and was taking abx for this. After fall pt awoke on floor. Had severe pain in ribs bilaterally with inspiration and movement. No sob. No cp. \" Periodontal infection  - continue cefdinir from PCP, added metronidazole. Options limited due to allergy list.  F/u with dentist.  - f/u urine culture as well    Syncope  - due to above. Noted the ED visit a few months ago for SVT. No palpitations this time, no events on tele. F/u TTE. Troponin negative, no preceding chest pain. Bilateral 5-6th rib fractures  - supportive care. Analgesia. Porcelain gallbladder  - family says that they have been told about this in the past.  I do not anticipate the need for surgery, but she should probably be referred to a surgeon for their opinion if the PCP has not already done so. Risk of cancer is overall low but still exists. CAD with previous stenting in 2007  - aspirin, statin, carvedilol    HTN, chronic diastolic CHF  - ARB, carvedilol      DVT Prophylaxis: enoxaparin  Diet: DIET LOW SODIUM 2 GM;  Code Status: Full Code    PT/OT Eval Status: rec'd SNF    Dispo - likely medically ready 8/7 if TTE OK. She had been living at home, plans to now live with daughter.       Yuki Webb MD

## 2018-08-06 NOTE — PLAN OF CARE
Problem: Falls - Risk of:  Goal: Will remain free from falls  Will remain free from falls   Outcome: Ongoing  Pt remained free of falls. Call light within reach. Bed alarm on. Non skid footwear in place. Bed locked and in lowest position. Will continue to monitor.

## 2018-08-06 NOTE — H&P
Hospital Medicine History & Physical      PCP: Avelino Rai PA-C    Date of Admission: 8/5/2018    Date of Service: Pt seen/examined on 8/518 and Admitted to Inpatient with expected LOS greater than two midnights due to medical therapy. Chief Complaint:  Rib pain      History Of Present Illness:      80 y.o. female who presented to [facility] with rib pain. Pt had complete syncopal episode today. Had LOC and fell forward on a chair and hit her anterior chest and chin. Pt has had decreased po intake after being treated for dental infection and was taking abx for this  After fall pt awoke on floor. Had severe pain in ribs bilaterally with inspiration and movement. No sob. No cp. Past Medical History:          Diagnosis Date    Arthritis     CAD (coronary artery disease)     stent placed 2007    CHF (congestive heart failure) ([address]     Depression 1980    treated with electoshock successfully    Diphtheria     age 15   [facility] Gout     Hyperlipemia     Hypertension     STEMI (ST elevation myocardial infarction) ([facility] [address] 2007    Thyroid disease     Wears hearing aid     left ear       Past Surgical History:          Procedure Laterality Date    APPENDECTOMY      BREAST SURGERY      biopsy benign    CARDIAC SURGERY  2007    stent    CATARACT REMOVAL WITH IMPLANT Right 10/11/2017    COLONOSCOPY  4/29/2013    HYSTERECTOMY      JOINT REPLACEMENT Left 8/13/14    LEFT TOTAL KNEE REPLACEMENT WITH FEMORAL NERVE BLOCK FOR PAIN    OTHER SURGICAL HISTORY Right 1/6/14    right total knee replacement    TONSILLECTOMY         Medications Prior to Admission:      Prior to Admission medications    Medication Sig Start Date End Date Taking? Authorizing Provider   Acetaminophen 325 MG CAPS Take 650 mg by mouth 3 times daily   Yes Historical Provider, MD   hydrocortisone (ANUSOL-HC) 2.5 % rectal cream Place rectally 2 times daily Place rectally 2 times daily.    Yes Historical Provider, MD   psyllium (KONSYL) 28.3 antibiotics    Social History:      The patient currently lives family  She is from [address] [place] [address]:   reports that she quit smoking about 18 years ago. Her smoking use included Cigarettes. She has a 20.00 pack-year smoking history. She has never used smokeless tobacco.  ETOH:   reports that she does not drink alcohol. Family History:       Reviewed in detail and negative for DM, CAD, Cancer, CVA. Positive as follows:    Family History   Problem Relation Age of Onset    Cancer Brother         colon       REVIEW OF SYSTEMS:   Pertinent positives as noted in the HPI. All other systems reviewed and negative. PHYSICAL EXAM PERFORMED:    BP (!) 146/75   Pulse 63   Temp 98.8 °F (37.1 °C) (Oral)   Resp 20   Ht 5' 2\" (1.575 m)   Wt 181 lb (82.1 kg)   SpO2 96%   BMI 33.11 kg/m²     General appearance:  No apparent distress, appears stated age and cooperative. HEENT:  Normal cephalic, atraumatic without obvious deformity. Pupils equal, round, and reactive to light. Extra ocular muscles intact. Conjunctivae/corneas clear. Chin ecchymosis  Neck: Supple, with full range of motion. No jugular venous distention. Trachea midline. Respiratory:  Normal respiratory effort. Clear to auscultation, bilaterally without Rales/Wheezes/Rhonchi. Cardiovascular:  Regular rate and rhythm with normal S1/S2 without murmurs, rubs or gallops. Abdomen: Soft, non-tender, non-distended with normal bowel sounds. Musculoskeletal:  No clubbing, cyanosis or edema bilaterally. Full range of motion without deformity. Pain in ribs with palpation  Skin: Skin color, texture, turgor normal.  No rashes or lesions. Neurologic:  Neurovascularly intact without any focal sensory/motor deficits.  Cranial nerves: II-XII intact, grossly non-focal.  Psychiatric:  Alert and oriented, thought content appropriate, normal insight  Capillary Refill: Brisk,< 3 seconds   Peripheral Pulses: +2 palpable, equal bilaterally       Labs:     Recent Labs

## 2018-08-06 NOTE — PLAN OF CARE
Problem: SAFETY  Goal: Free from accidental physical injury  Outcome: Completed Date Met: 08/06/18  Pt a/o; able to call for assistance; bed alarm on; non skid footwear on; will continue to monitor

## 2018-08-07 LAB
ANION GAP SERPL CALCULATED.3IONS-SCNC: 11 MMOL/L (ref 3–16)
BUN BLDV-MCNC: 38 MG/DL (ref 7–20)
CALCIUM SERPL-MCNC: 9.1 MG/DL (ref 8.3–10.6)
CHLORIDE BLD-SCNC: 104 MMOL/L (ref 99–110)
CO2: 23 MMOL/L (ref 21–32)
CREAT SERPL-MCNC: 1.5 MG/DL (ref 0.6–1.2)
GFR AFRICAN AMERICAN: 40
GFR NON-AFRICAN AMERICAN: 33
GLUCOSE BLD-MCNC: 100 MG/DL (ref 70–99)
HCT VFR BLD CALC: 34.1 % (ref 36–48)
HEMOGLOBIN: 11 G/DL (ref 12–16)
MCH RBC QN AUTO: 30.1 PG (ref 26–34)
MCHC RBC AUTO-ENTMCNC: 32.2 G/DL (ref 31–36)
MCV RBC AUTO: 93.6 FL (ref 80–100)
PDW BLD-RTO: 13.7 % (ref 12.4–15.4)
PLATELET # BLD: 183 K/UL (ref 135–450)
PMV BLD AUTO: 8.5 FL (ref 5–10.5)
POTASSIUM REFLEX MAGNESIUM: 4.5 MMOL/L (ref 3.5–5.1)
RBC # BLD: 3.64 M/UL (ref 4–5.2)
SODIUM BLD-SCNC: 138 MMOL/L (ref 136–145)
URINE CULTURE, ROUTINE: NORMAL
WBC # BLD: 6.2 K/UL (ref 4–11)

## 2018-08-07 PROCEDURE — 97530 THERAPEUTIC ACTIVITIES: CPT

## 2018-08-07 PROCEDURE — 36415 COLL VENOUS BLD VENIPUNCTURE: CPT

## 2018-08-07 PROCEDURE — 85027 COMPLETE CBC AUTOMATED: CPT

## 2018-08-07 PROCEDURE — 1200000000 HC SEMI PRIVATE

## 2018-08-07 PROCEDURE — 2580000003 HC RX 258: Performed by: INTERNAL MEDICINE

## 2018-08-07 PROCEDURE — 6370000000 HC RX 637 (ALT 250 FOR IP): Performed by: INTERNAL MEDICINE

## 2018-08-07 PROCEDURE — 6360000002 HC RX W HCPCS: Performed by: INTERNAL MEDICINE

## 2018-08-07 PROCEDURE — 97116 GAIT TRAINING THERAPY: CPT

## 2018-08-07 PROCEDURE — 97535 SELF CARE MNGMENT TRAINING: CPT

## 2018-08-07 PROCEDURE — 80048 BASIC METABOLIC PNL TOTAL CA: CPT

## 2018-08-07 PROCEDURE — 94761 N-INVAS EAR/PLS OXIMETRY MLT: CPT

## 2018-08-07 PROCEDURE — 2700000000 HC OXYGEN THERAPY PER DAY

## 2018-08-07 PROCEDURE — 6370000000 HC RX 637 (ALT 250 FOR IP): Performed by: NURSE PRACTITIONER

## 2018-08-07 RX ORDER — 0.9 % SODIUM CHLORIDE 0.9 %
1000 INTRAVENOUS SOLUTION INTRAVENOUS ONCE
Status: COMPLETED | OUTPATIENT
Start: 2018-08-07 | End: 2018-08-07

## 2018-08-07 RX ORDER — CEFDINIR 300 MG/1
300 CAPSULE ORAL DAILY
Status: DISCONTINUED | OUTPATIENT
Start: 2018-08-08 | End: 2018-08-08 | Stop reason: HOSPADM

## 2018-08-07 RX ADMIN — ATORVASTATIN CALCIUM 20 MG: 10 TABLET, FILM COATED ORAL at 21:55

## 2018-08-07 RX ADMIN — Medication 10 ML: at 22:01

## 2018-08-07 RX ADMIN — POLYETHYLENE GLYCOL 3350 17 G: 17 POWDER, FOR SOLUTION ORAL at 08:32

## 2018-08-07 RX ADMIN — ENOXAPARIN SODIUM 40 MG: 100 INJECTION SUBCUTANEOUS at 08:34

## 2018-08-07 RX ADMIN — METRONIDAZOLE 500 MG: 250 TABLET ORAL at 13:46

## 2018-08-07 RX ADMIN — CARVEDILOL 6.25 MG: 6.25 TABLET, FILM COATED ORAL at 08:33

## 2018-08-07 RX ADMIN — LOSARTAN POTASSIUM 25 MG: 25 TABLET, FILM COATED ORAL at 08:33

## 2018-08-07 RX ADMIN — ASPIRIN 81 MG: 81 TABLET, COATED ORAL at 08:33

## 2018-08-07 RX ADMIN — SODIUM CHLORIDE 1000 ML: 9 INJECTION, SOLUTION INTRAVENOUS at 11:22

## 2018-08-07 RX ADMIN — Medication 10 ML: at 08:33

## 2018-08-07 RX ADMIN — METRONIDAZOLE 500 MG: 250 TABLET ORAL at 21:55

## 2018-08-07 RX ADMIN — METRONIDAZOLE 500 MG: 250 TABLET ORAL at 05:22

## 2018-08-07 RX ADMIN — CARVEDILOL 6.25 MG: 6.25 TABLET, FILM COATED ORAL at 17:51

## 2018-08-07 RX ADMIN — TRAMADOL HYDROCHLORIDE 50 MG: 50 TABLET, FILM COATED ORAL at 22:00

## 2018-08-07 RX ADMIN — LEVOTHYROXINE SODIUM 100 MCG: 100 TABLET ORAL at 05:22

## 2018-08-07 RX ADMIN — CEFDINIR 300 MG: 300 CAPSULE ORAL at 09:50

## 2018-08-07 ASSESSMENT — PAIN DESCRIPTION - PAIN TYPE: TYPE: ACUTE PAIN

## 2018-08-07 ASSESSMENT — PAIN SCALES - GENERAL
PAINLEVEL_OUTOF10: 4
PAINLEVEL_OUTOF10: 5
PAINLEVEL_OUTOF10: 7

## 2018-08-07 ASSESSMENT — PAIN DESCRIPTION - PROGRESSION: CLINICAL_PROGRESSION: NOT CHANGED

## 2018-08-07 ASSESSMENT — PAIN DESCRIPTION - FREQUENCY: FREQUENCY: CONTINUOUS

## 2018-08-07 ASSESSMENT — PAIN DESCRIPTION - DESCRIPTORS: DESCRIPTORS: ACHING;CONSTANT

## 2018-08-07 ASSESSMENT — PAIN DESCRIPTION - LOCATION: LOCATION: RIB CAGE

## 2018-08-07 ASSESSMENT — PAIN DESCRIPTION - ORIENTATION: ORIENTATION: LEFT;RIGHT;OTHER (COMMENT)

## 2018-08-07 NOTE — PROGRESS NOTES
assistance (to brush teeth, wash face, and comb hair standing)     Balance  Sitting Balance: Supervision  Standing Balance: Contact guard assistance (for dynamic balance with RW; SBA for static balance with RW)    Functional Mobility Comments: Pt walked ~80ft during session with gait belt, RW, and CGA with increased time. Transfers  Sit to stand: Contact guard assistance (with RW)  Stand to sit: Contact guard assistance      Cognition  Overall Cognitive Status: WFL      Education: Role of OT, safe t/f training, safe use of DME, awareness of deficits, discharge planning, ADL as therapeutic exercise, importance of OOB     Assessment   Performance deficits / Impairments: Decreased functional mobility ; Decreased endurance;Decreased ADL status; Decreased balance;Decreased strength;Decreased high-level IADLs  After tx, pt found to be presenting with the above mentioned deficits. Pt would benefit from continued skilled occupational therapy to address these deficits, increasing safety and independence with ADL and functional mobility. Pt has good potential to meet therapy goals. Will continue to assess for discharge needs. Rec SNF. Prognosis: Good  REQUIRES OT FOLLOW UP: Yes  Activity Tolerance  Activity Tolerance: Patient Tolerated treatment well  Safety Devices  Safety Devices in place: Yes  Type of devices: Left in chair;Call light within reach;Nurse notified; Chair alarm in place;Gait belt         Plan   Plan  Times per week: 3-5x's a week while in acute care    AM-PAC Score   AM-PAC Inpatient Daily Activity Raw Score: 17  AM-PAC Inpatient ADL T-Scale Score : 37.26  ADL Inpatient CMS 0-100% Score: 50.11  ADL Inpatient CMS G-Code Modifier : CK    Goals  Short term goals  Time Frame for Short term goals: 1 week unless otherwise specified  Short term goal 1: Pt. will complete LE dressing with Leif  Short term goal 2: Pt. will complete toilet transfers with SBA by 8/10  Short term goal 3: Pt. will complete standing level ADLs with SBA for balance  Patient Goals   Patient goals : \"to walk so I can go home\"       Therapy Time   Individual Concurrent Group Co-treatment   Time In 0859         Time Out 0925         Minutes 26         Timed Code Treatment Minutes: 26 Minutes     If patient is discharged prior to next treatment session, this note will serve as the discharge summary.   Phil Willis, OTR/L #[id number]

## 2018-08-07 NOTE — PROGRESS NOTES
Physical Therapy  Facility/Department: [facility] C3 TELE/MED SURG/ONC  Daily Treatment Note  NAME: Luigi Bustamante  : 1930  MRN: [id number]    Date of Service: 2018    Discharge Recommendations:  Subacute/Skilled Nursing Facility   PT Equipment Recommendations  Equipment Needed: No    Patient Diagnosis(es): The primary encounter diagnosis was Syncope and collapse. Diagnoses of Injury of head, initial encounter and Closed fracture of multiple ribs of both sides, initial encounter were also pertinent to this visit. has a past medical history of Arthritis; CAD (coronary artery disease); CHF (congestive heart failure) ([facility] [address] Depression; Diphtheria; Gout; Hyperlipemia; Hypertension; STEMI (ST elevation myocardial infarction) ([facility] [address] Thyroid disease; and Wears hearing aid. has a past surgical history that includes Hysterectomy; Appendectomy; Tonsillectomy; Colonoscopy (2013); Breast surgery; Cardiac surgery (); joint replacement (Left, 14); other surgical history (Right, 14); and Cataract removal with implant (Right, 10/11/2017). Restrictions  Restrictions/Precautions  Restrictions/Precautions: General Precautions, Fall Risk  Position Activity Restriction  Other position/activity restrictions: High fall risk, telemetry  Subjective   General  Chart Reviewed: Yes  Referring Practitioner: Caity Gama MD  Subjective  Subjective: Pt agreeable to therapy.   Reports she is ready to walk  General Comment  Comments: Pt up in chair upon arrival.  Pain Screening  Patient Currently in Pain: Yes (pain in left side from rib fracture and LLE from \"nerve pain\")  Vital Signs  Patient Currently in Pain: Yes (pain in left side from rib fracture and LLE from \"nerve pain\")       Objective   Bed mobility  Sit to Supine: Minimal assistance (assist with BLEs)  Comment: increased time to perform secondary to pain  Transfers  Sit to Stand: Stand by assistance (performed from recliner and EOB)  Stand to sit: Stand by assistance  Ambulation  Ambulation?: Yes  Ambulation 1  Surface: level tile  Device: Rolling Walker  Assistance: Stand by assistance  Quality of Gait: short steps bilaterally, decreased yazan, steady with turns  Distance: 100' and 25'  Comments: pt feeling nauseated toward end of gait distance, vitals stable  Stairs/Curb  Stairs?: No    Assessment   Body structures, Functions, Activity limitations: Decreased functional mobility ; Decreased ADL status; Decreased strength;Decreased safe awareness;Decreased balance;Decreased high-level IADLs  Assessment: Pt demonstrating good progress with gait distance this session. Pt continues to have pain in rib cage requiring increased time for functional mobility and gait. Pt nauseated at end of gait distance but resolved with seated rest break. Continue to recommend SNF for additional therapy. Will require 24hr supervision if pt discharges home. Treatment Diagnosis: impaired functional mobility  Prognosis: Good  Patient Education: Educated on splinting with use of pillow to reduce pain  REQUIRES PT FOLLOW UP: Yes  Activity Tolerance  Activity Tolerance: Patient Tolerated treatment well;Patient limited by pain; Patient limited by fatigue     AM-PAC Score     AM-PAC Inpatient Mobility without Stair Climbing Raw Score : 13  AM-PAC Inpatient without Stair Climbing T-Scale Score : 38.96  Mobility Inpatient CMS 0-100% Score: 58.44  Mobility Inpatient without Stair CMS G-Code Modifier : CK     Goals  Short term goals  Time Frame for Short term goals: 1 week  Short term goal 1: Pt will ambulate 100 feet with RW at SBA to improve independence. GOAL MET 8/7, updated goal: Pt will ambulate 150' with RW mod I  Short term goal 2: Pt will transfer supine<>sit with SBA to allow safe return home. Short term goal 3: Pt will transfer sit<>stand using RW with supervision to improve functional mobility. Short term goal 4: 8/8/18:  Pt will perform 12-15 reps of BLE exercises to improve muscle endurance and strength for ADL's. GOAL MET and ongoing  Patient Goals   Patient goals : \"to go home\"    Plan    Plan  Times per week: 3-5x/week  Times per day: Daily  Current Treatment Recommendations: Strengthening, Pain Management, Positioning, Balance Training, Functional Mobility Training, Transfer Training, Gait Training, Patient/Caregiver Education & Training, Safety Education & Training, Home Exercise Program  Safety Devices  Type of devices:  All fall risk precautions in place, Call light within reach, Nurse notified, Patient at risk for falls, Left in bed, Bed alarm in place     Therapy Time   Individual Concurrent Group Co-treatment   Time In 1038         Time Out 1118         Minutes 40         Timed Code Treatment Minutes: [address] [facility],   [facility]

## 2018-08-07 NOTE — PROGRESS NOTES
excluded in the appropriate   clinical setting. Persistent right renal pelviectasis. CT CHEST WO CONTRAST   Final Result   Bilateral rib fractures are seen in the chest.  No pneumothorax noted. A few   scattered indeterminate pulmonary nodules are seen. No acute traumatic solid organ injury identified the abdomen or pelvis. Gallstones are seen. In addition, there may be concomitant gallbladder wall   calcification-porcelain gallbladder. CT ABDOMEN PELVIS WO CONTRAST Additional Contrast? None   Final Result   Bilateral rib fractures are seen in the chest.  No pneumothorax noted. A few   scattered indeterminate pulmonary nodules are seen. No acute traumatic solid organ injury identified the abdomen or pelvis. Gallstones are seen. In addition, there may be concomitant gallbladder wall   calcification-porcelain gallbladder. CT LUMBAR RECONSTRUCTION WO POST PROCESS   Preliminary Result   Multilevel degenerative changes of lumbar spine with no definite radiographic   findings to suggest presence of acute fracture. If clinical symptomatology   persists, follow-up MRI examination may be helpful for more complete   evaluation. CT CERVICAL SPINE WO CONTRAST   Final Result   No acute abnormality of the cervical spine. CT HEAD WO CONTRAST   Final Result   No acute intracranial abnormality. Diffuse atrophic changes with findings suggesting chronic microvascular   ischemia                 Assessment/Plan:    Active Hospital Problems    Diagnosis Date Noted    Syncope and collapse [R55] 08/05/2018    HTN (hypertension) [I10] 01/06/2015    CAD (coronary artery disease) [I25.10] 10/09/2011       \"87 y.o. female who presented to [facility] with rib pain. Pt had complete syncopal episode today. Had LOC and fell forward on a chair and hit her anterior chest and chin.  Pt has had decreased po intake after being treated for dental infection and was taking

## 2018-08-08 VITALS
BODY MASS INDEX: 34.31 KG/M2 | HEIGHT: 62 IN | DIASTOLIC BLOOD PRESSURE: 91 MMHG | WEIGHT: 186.44 LBS | SYSTOLIC BLOOD PRESSURE: 136 MMHG | RESPIRATION RATE: 16 BRPM | TEMPERATURE: 98.4 F | HEART RATE: 58 BPM | OXYGEN SATURATION: 98 %

## 2018-08-08 LAB
ANION GAP SERPL CALCULATED.3IONS-SCNC: 8 MMOL/L (ref 3–16)
BUN BLDV-MCNC: 40 MG/DL (ref 7–20)
CALCIUM SERPL-MCNC: 8.9 MG/DL (ref 8.3–10.6)
CHLORIDE BLD-SCNC: 106 MMOL/L (ref 99–110)
CO2: 24 MMOL/L (ref 21–32)
CREAT SERPL-MCNC: 1.3 MG/DL (ref 0.6–1.2)
GFR AFRICAN AMERICAN: 47
GFR NON-AFRICAN AMERICAN: 39
GLUCOSE BLD-MCNC: 98 MG/DL (ref 70–99)
HCT VFR BLD CALC: 33.8 % (ref 36–48)
HEMOGLOBIN: 11.1 G/DL (ref 12–16)
MCH RBC QN AUTO: 30.6 PG (ref 26–34)
MCHC RBC AUTO-ENTMCNC: 32.8 G/DL (ref 31–36)
MCV RBC AUTO: 93.3 FL (ref 80–100)
PDW BLD-RTO: 13.6 % (ref 12.4–15.4)
PLATELET # BLD: 178 K/UL (ref 135–450)
PMV BLD AUTO: 8.3 FL (ref 5–10.5)
POTASSIUM REFLEX MAGNESIUM: 4.5 MMOL/L (ref 3.5–5.1)
RBC # BLD: 3.62 M/UL (ref 4–5.2)
SODIUM BLD-SCNC: 138 MMOL/L (ref 136–145)
WBC # BLD: 6 K/UL (ref 4–11)

## 2018-08-08 PROCEDURE — G8987 SELF CARE CURRENT STATUS: HCPCS

## 2018-08-08 PROCEDURE — G8988 SELF CARE GOAL STATUS: HCPCS

## 2018-08-08 PROCEDURE — 97530 THERAPEUTIC ACTIVITIES: CPT

## 2018-08-08 PROCEDURE — 2580000003 HC RX 258: Performed by: INTERNAL MEDICINE

## 2018-08-08 PROCEDURE — 6370000000 HC RX 637 (ALT 250 FOR IP): Performed by: INTERNAL MEDICINE

## 2018-08-08 PROCEDURE — 97116 GAIT TRAINING THERAPY: CPT

## 2018-08-08 PROCEDURE — 80048 BASIC METABOLIC PNL TOTAL CA: CPT

## 2018-08-08 PROCEDURE — 36415 COLL VENOUS BLD VENIPUNCTURE: CPT

## 2018-08-08 PROCEDURE — 85027 COMPLETE CBC AUTOMATED: CPT

## 2018-08-08 RX ORDER — CEFDINIR 300 MG/1
300 CAPSULE ORAL DAILY
Qty: 2 CAPSULE | Refills: 0
Start: 2018-08-09 | End: 2018-08-11

## 2018-08-08 RX ORDER — SPIRONOLACTONE 25 MG/1
25 TABLET ORAL DAILY
Qty: 30 TABLET | Refills: 3
Start: 2018-08-10

## 2018-08-08 RX ORDER — TRAMADOL HYDROCHLORIDE 50 MG/1
25 TABLET ORAL EVERY 6 HOURS PRN
Qty: 10 TABLET | Refills: 0 | Status: SHIPPED | OUTPATIENT
Start: 2018-08-08 | End: 2018-08-18

## 2018-08-08 RX ORDER — VALSARTAN 40 MG/1
40 TABLET ORAL DAILY
Qty: 30 TABLET | Refills: 3
Start: 2018-08-10

## 2018-08-08 RX ORDER — SENNA AND DOCUSATE SODIUM 50; 8.6 MG/1; MG/1
1 TABLET, FILM COATED ORAL DAILY
Status: DISCONTINUED | OUTPATIENT
Start: 2018-08-08 | End: 2018-08-08 | Stop reason: HOSPADM

## 2018-08-08 RX ORDER — TRAMADOL HYDROCHLORIDE 50 MG/1
25 TABLET ORAL EVERY 6 HOURS PRN
Status: DISCONTINUED | OUTPATIENT
Start: 2018-08-08 | End: 2018-08-08 | Stop reason: HOSPADM

## 2018-08-08 RX ORDER — POLYETHYLENE GLYCOL 3350 17 G/17G
17 POWDER, FOR SOLUTION ORAL DAILY
Qty: 527 G | Refills: 1
Start: 2018-08-09 | End: 2018-09-08

## 2018-08-08 RX ORDER — LIDOCAINE 50 MG/G
2 PATCH TOPICAL DAILY
Qty: 30 PATCH | Refills: 0
Start: 2018-08-09

## 2018-08-08 RX ADMIN — CARVEDILOL 6.25 MG: 6.25 TABLET, FILM COATED ORAL at 10:07

## 2018-08-08 RX ADMIN — METRONIDAZOLE 500 MG: 250 TABLET ORAL at 05:50

## 2018-08-08 RX ADMIN — MAGNESIUM HYDROXIDE 30 ML: 400 SUSPENSION ORAL at 14:26

## 2018-08-08 RX ADMIN — DOCUSATE SODIUM AND SENNOSIDES 1 TABLET: 8.6; 5 TABLET, FILM COATED ORAL at 14:13

## 2018-08-08 RX ADMIN — LEVOTHYROXINE SODIUM 100 MCG: 100 TABLET ORAL at 05:51

## 2018-08-08 RX ADMIN — ASPIRIN 81 MG: 81 TABLET, COATED ORAL at 10:08

## 2018-08-08 RX ADMIN — Medication 10 ML: at 10:07

## 2018-08-08 RX ADMIN — POLYETHYLENE GLYCOL 3350 17 G: 17 POWDER, FOR SOLUTION ORAL at 10:08

## 2018-08-08 RX ADMIN — CEFDINIR 300 MG: 300 CAPSULE ORAL at 10:11

## 2018-08-08 ASSESSMENT — PAIN SCALES - GENERAL
PAINLEVEL_OUTOF10: 2

## 2018-08-08 ASSESSMENT — PAIN DESCRIPTION - DESCRIPTORS: DESCRIPTORS: ACHING;CONSTANT

## 2018-08-08 ASSESSMENT — PAIN DESCRIPTION - LOCATION
LOCATION: RIB CAGE

## 2018-08-08 ASSESSMENT — PAIN DESCRIPTION - ORIENTATION
ORIENTATION: LEFT
ORIENTATION: LEFT
ORIENTATION: LEFT;RIGHT

## 2018-08-08 ASSESSMENT — PAIN DESCRIPTION - PAIN TYPE
TYPE: ACUTE PAIN
TYPE: ACUTE PAIN

## 2018-08-08 NOTE — PROGRESS NOTES
.  Patient's EF (Ejection Fraction) is greater than 40%    Patient has a past medical history of Arthritis; CAD (coronary artery disease); CHF (congestive heart failure) ([facility] [address] Depression; Diphtheria; Gout; Hyperlipemia; Hypertension; STEMI (ST elevation myocardial infarction) ([facility] [address] Thyroid disease; and Wears hearing aid. Comorbidities reviewed and education provided. Patient and family's stated goal of care: reduce shortness of breath prior to discharge    Patient's current functional capacity:  Marked limitation of physical activity. Comfortable at rest. Less than ordinary activity causes fatigue, palpitation, or dyspnea. Pt up in chair at this time on 2L/NC. Pt  shortness of breath. Pt without lower extremity edema. Patient's weights and intake/output reviewed:    Patient Vitals for the past 96 hrs (Last 3 readings):   Weight   08/08/18 0857 186 lb 7 oz (84.6 kg)   08/05/18 1400 181 lb (82.1 kg)       Intake/Output Summary (Last 24 hours) at 08/08/18 0947  Last data filed at 08/08/18 0807   Gross per 24 hour   Intake              250 ml   Output              225 ml   Net               25 ml       Patient provided with education on CHF signs/symptoms, causes, discharge medications, daily weights, low sodium diet, activity, and follow-up. Notified patient to call the doctor post discharge if patient experiences shortness of breath, chest pain, swelling, cough, or weight gain of three pounds in a day/five pounds in a week. Also notified patient to call the doctor with dizziness, increased fatigue, decreased or difficulty urinating. Pt verbalized understanding. No additional questions at this time.     Education Time: 10 Minutes

## 2018-08-08 NOTE — PROGRESS NOTES
Pt is alert and oriented. VSS. RA. Pt c/o 4/10 pain on a 1-10 scale. Pt given PRN pain medication per MAR. Breath sounds clear and diminished. Pt has bruise on chin and scattered bruising due to recent fall. Shift assessment completed and documented. Call light within reach. Bed side table within reach. Wheels locked. Bed in lowest position. Bed check in place. Pt instructed to call out for assistance. Pt expressesed understanding & calls out appropritately. All care per orders. Will continue to monitor.  Electronically signed by Adeline Cohen RN on 8/7/2018 at 11:26 PM

## 2018-08-08 NOTE — DISCHARGE SUMMARY
Hospital Medicine Discharge Summary    Patient ID: [id number] [facility]      Patient's PCP: Jess Carrero PA-C    Admit Date: 8/5/2018     Discharge Date:   08/08/18     Admitting Physician: Amber Hess MD     Discharge Physician: Terra Qureshi MD     Discharge Diagnoses: Active Hospital Problems    Diagnosis Date Noted    Syncope and collapse [R55] 08/05/2018    HTN (hypertension) [I10] 01/06/2015    CAD (coronary artery disease) [I25.10] 10/09/2011       The patient was seen and examined on day of discharge and this discharge summary is in conjunction with any daily progress note from day of discharge. Hospital Course:  \"87 y. o. female who presented to [facility] with rib pain. Pt had complete syncopal episode today. Had LOC and fell forward on a chair and hit her anterior chest and chin. Pt has had decreased po intake after being treated for dental infection and was taking abx for this. After fall pt awoke on floor. Had severe pain in ribs bilaterally with inspiration and movement. No sob. No cp. \"         Periodontal infection  - continue cefdinir from PCP, improved with this, she did not tolerate metronidazole (lost appetite). Options limited due to allergy list.  F/u with dentist as outpatient.     Syncope  - due to above. Noted the ED visit a few months ago for SVT. No palpitations this time, no events on tele. TTE showed EF 50% with RWMAs (no change from TTE years ago). Troponin negative, no preceding chest pain. No further cardiac workup anticipated. If she has further issues outpatient cardiac monitor could be considered, but this is not necessary at this point.      Bilateral 5-6th rib fractures  - supportive care. Analgesia.     ELIZABETH  - perhaps related to above. Baseline Cr about 1.2. Held ARB and spironolactone, can resume these in two days. Improved with IVFs.        Porcelain gallbladder  - family says that they have been told about this in the past.  I do not anticipate the need for surgery, but she should probably be referred to a surgeon for their opinion if the PCP has not already done so. Risk of cancer is overall low but still exists and gallbladder malignancies have poor prognosis.       CAD with previous stenting in 2007  - aspirin, statin, carvedilol     HTN, chronic diastolic CHF  - ARB on hold for ELIZABETH, continue carvedilol. She is not normally diuretic-dependent other than the spironolactone. Physical Exam Performed:     /75   Pulse 58   Temp 98.4 °F (36.9 °C) (Oral)   Resp 16   Ht 5' 2\" (1.575 m)   Wt 186 lb 7 oz (84.6 kg)   SpO2 98%   BMI 34.10 kg/m²       General appearance: No apparent distress, appears stated age and cooperative. HEENT: Pupils equal, round, and reactive to light. Conjunctivae/corneas clear. Neck: Supple, with full range of motion. No jugular venous distention. Trachea midline. Respiratory:  Normal respiratory effort. Clear to auscultation, bilaterally without Rales/Wheezes/Rhonchi. Cardiovascular: Regular rate and rhythm with normal S1/S2 without murmurs, rubs or gallops. Abdomen: Soft, non-tender, non-distended with normal bowel sounds. Musculoskeletal: No clubbing, cyanosis or edema bilaterally. Full range of motion without deformity. Chest wall tender. Skin: Skin color, texture, turgor normal.  No rashes or lesions. Neurologic:  Neurovascularly intact without any focal sensory/motor deficits. Cranial nerves: II-XII intact, grossly non-focal.  Psychiatric: Alert and oriented, thought content appropriate, normal insight  Capillary Refill: Brisk,< 3 seconds   Peripheral Pulses: +2 palpable, equal bilaterally      Labs:  For convenience and continuity at follow-up the following most recent labs are provided:      CBC:    Lab Results   Component Value Date    WBC 6.0 08/08/2018    HGB 11.1 08/08/2018    HCT 33.8 08/08/2018     08/08/2018       Renal:    Lab Results   Component Value Date     HOSPITALIST  IP CONSULT TO HEART FAILURE NURSE/COORDINATOR    Disposition:  SNF     Condition at Discharge: Stable    Discharge Instructions/Follow-up:  Follow up with PCP within 1-2 weeks and have your kidney bloodwork checked. Code Status:  Full Code     Activity: activity as tolerated    Diet: cardiac diet      Discharge Medications:     Current Discharge Medication List           Details   traMADol (ULTRAM) 50 MG tablet Take 0.5 tablets by mouth every 6 hours as needed for Pain for up to 10 days. Erlene Ripper: 10 tablet, Refills: 0    Associated Diagnoses: Injury of head, initial encounter      cefdinir (OMNICEF) 300 MG capsule Take 1 capsule by mouth daily for 2 days  Qty: 2 capsule, Refills: 0      lidocaine (LIDODERM) 5 % Place 2 patches onto the skin daily 12 hours on, 12 hours off. Qty: 30 patch, Refills: 0      polyethylene glycol (GLYCOLAX) packet Take 17 g by mouth daily  Qty: 527 g, Refills: 1              Details   valsartan (DIOVAN) 40 MG tablet Take 1 tablet by mouth daily  Qty: 30 tablet, Refills: 3      spironolactone (ALDACTONE) 25 MG tablet Take 1 tablet by mouth daily  Qty: 30 tablet, Refills: 3              Details   Acetaminophen 325 MG CAPS Take 650 mg by mouth 3 times daily      hydrocortisone (ANUSOL-HC) 2.5 % rectal cream Place rectally 2 times daily Place rectally 2 times daily.       psyllium (KONSYL) 28.3 % PACK Take 1 packet by mouth daily      NONFORMULARY       atorvastatin (LIPITOR) 20 MG tablet Take 20 mg by mouth daily      tolterodine (DETROL LA) 2 MG extended release capsule Take 2 mg by mouth Daily with supper      methenamine (MANDELAMINE) 1 g tablet Take 1 g by mouth 2 times daily      Cholecalciferol (VITAMIN D) 2000 units CAPS capsule Take by mouth Daily      carvedilol (COREG) 6.25 MG tablet Take 6.25 mg by mouth 2 times daily (with meals)      estradiol (ESTRACE) 0.1 MG/GM vaginal cream Place 1 g vaginally Twice a Week       Cyanocobalamin (VITAMIN B 12 PO) Take 1,000 Units by mouth daily       allopurinol (ZYLOPRIM) 100 MG tablet Take 100 mg by mouth daily      docusate sodium (COLACE) 100 MG capsule Take 100 mg by mouth 2 times daily      nitroGLYCERIN (NITROSTAT) 0.4 MG SL tablet Place 0.4 mg under the tongue every 5 minutes as needed (never  used). meclizine (ANTIVERT) 25 MG CHEW Take 25 mg by mouth 3 times daily as needed (not used recently). levothyroxine (SYNTHROID) 100 MCG tablet Take 100 mcg by mouth daily. aspirin 81 MG EC tablet Take 81 mg by mouth daily as needed (stopped 7 days prior to surgery). Time Spent on discharge is more than 30 minutes in the examination, evaluation, counseling and review of medications and discharge plan. Signed:    Carly Pires MD   8/8/2018      Thank you Lamberto Galvez PA-C for the opportunity to be involved in this patient's care. If you have any questions or concerns please feel free to contact me at [phone].

## 2018-08-08 NOTE — PROGRESS NOTES
Orientation  Orientation  Overall Orientation Status: Within Normal Limits     Objective   Bed mobility  Supine to Sit: Stand by assistance (HOB elevated; increased time to complete due to pain)  Sit to Supine: Unable to assess (pt sitting in chair at end of session)  Scooting: Stand by assistance (increased time to complete due to pain)     Transfers  Sit to Stand: Stand by assistance;Contact guard assistance (SBA from EOB and chair; CGA from toilet)  Stand to sit: Stand by assistance     Ambulation  Ambulation?: Yes  Ambulation 1  Surface: level tile  Device: Rolling Walker  Assistance: Stand by assistance  Quality of Gait: Patient ambulated with decreased step length bilaterally, decreased yazan, and decreased heel strike bilaterally. Patient demonstrated steady gait with no LOB. Distance: 30+210 ft    Stairs/Curb  Stairs?: Yes  Rails: Bilateral  Curbs: 4\"  Device: No Device  Assistance: Contact guard assistance  Comment: stepped up onto scale for weight    Assessment   Body structures, Functions, Activity limitations: Decreased functional mobility ; Decreased ADL status; Decreased strength;Decreased safe awareness;Decreased balance;Decreased high-level IADLs  Assessment: Patient demonstrated improved functional mobility today by ambulating 210 ft with SBA. Patient continues to require increased time to complete all mobility due to pain in rib cage  Patient continues to function below baseline with functional mobility, strength, and balance, and require continued skilled therapy to address deficits and DC safely. Recommending SNF upon DC.   Treatment Diagnosis: impaired functional mobility  Prognosis: Good  Patient Education: Role of PT, safety with mobility, DC recs  Barriers to Learning: None  REQUIRES PT FOLLOW UP: Yes  Activity Tolerance  Activity Tolerance: Patient Tolerated treatment well;Patient limited by fatigue;Patient limited by pain  Activity Tolerance: Patient reporting significant pain and

## 2018-08-08 NOTE — CARE COORDINATION
CASE MANAGEMENT DISCHARGE SUMMARY      Discharge to: [facility] completed: notification  Hospital Exemption Notification (HENS) completed: yes    New Durable Medical Equipment ordered/agency: none    Trans[facility]: [facility]   Family/car:    Medical Transport/ time: 5pm   Ambulance form completed: Yes    Notified: patient aware   Family: at bedside   Facility/Agency: [id number]   RN: purnima   Phone number for report to facility: [phone]    Note: Discharging nurse to complete [facility], reconcile [facility], and place final copy with patient's discharge packet. RN to ensure that written prescriptions for  Level II medications are sent with patient to the facility as per protocol.     Jermaine Chacko RN

## 2018-08-08 NOTE — PROGRESS NOTES
Intervention(s): Repositioned; Therapeutic presence;RN notified (RN in to apply pain patches)  Vital Signs  Patient Currently in Pain: Yes   Orientation  Orientation  Overall Orientation Status: Within Functional Limits  Objective    ADL  Feeding: Independent; Beverage management (beverage management seated)  Additional Comments: declined further ADL this date        Standing Balance  Sit to stand: Minimal assistance  Stand to sit: Contact guard assistance     Transfers  Sit to stand: Minimal assistance  Stand to sit: Contact guard assistance  Transfer Comments: repeated sit to stands with education provided regarding bracing L side with pillow during transitional movement for pain control, extended stand 8 min with RW for UE support, CGA. Assessment   Performance deficits / Impairments: Decreased functional mobility ; Decreased endurance;Decreased ADL status; Decreased balance;Decreased strength;Decreased high-level IADLs  Assessment: Patient tolerated well, declined ADL due to finished with routine for morning. Motivated to return to OF. Pleased with use of pillow for pain control during transitional movements, coughing, laughing. Prognosis: Good  Patient Education: ADLs, bed mobility, functional transfers and role of OT  REQUIRES OT FOLLOW UP: Yes  Activity Tolerance  Activity Tolerance: Patient Tolerated treatment well  Activity Tolerance:    Safety Devices  Safety Devices in place: Yes  Type of devices: Left in chair;Call light within reach;Nurse notified; Chair alarm in place;Gait belt          Plan   Plan  Times per week: 3-5x's a week while in acute care  G-Code  OT G-codes  Functional Assessment Tool Used: AM-PAC  Score: 17  Functional Limitation: Self care  Self Care Current Status (): At least 40 percent but less than 60 percent impaired, limited or restricted  Self Care Goal Status ():  At least 20 percent but less than 40 percent impaired, limited or restricted                                        AM-PAC Score        AM-[facility] Inpatient Daily Activity Raw Score: 17  AM-PAC Inpatient ADL T-Scale Score : 37.26  ADL Inpatient CMS 0-100% Score: 50.11  ADL Inpatient CMS G-Code Modifier : CK    Goals  Short term goals  Time Frame for Short term goals: 1 week unless otherwise specified  Short term goal 1: Pt. will complete LE dressing with Leif  Short term goal 2: Pt. will complete toilet transfers with SBA by 8/10  Short term goal 3: Pt. will complete standing level ADLs with SBA for balance  Patient Goals   Patient goals : \"to walk so I can go home\"       Therapy Time   Individual Concurrent Group Co-treatment   Time In 0942         Time Out 1011         Minutes 29         Timed Code Treatment Minutes: Democr[place] 9967, OTR/L 0965

## 2018-08-09 LAB
EKG ATRIAL RATE: 67 BPM
EKG DIAGNOSIS: NORMAL
EKG P AXIS: 57 DEGREES
EKG P-R INTERVAL: 182 MS
EKG Q-T INTERVAL: 394 MS
EKG QRS DURATION: 80 MS
EKG QTC CALCULATION (BAZETT): 416 MS
EKG R AXIS: -20 DEGREES
EKG T AXIS: 42 DEGREES
EKG VENTRICULAR RATE: 67 BPM

## 2018-10-24 ENCOUNTER — HOSPITAL ENCOUNTER (EMERGENCY)
Age: 83
Discharge: HOME | End: 2018-10-24
Attending: EMERGENCY MEDICINE
Payer: MEDICARE

## 2018-10-24 ENCOUNTER — APPOINTMENT (OUTPATIENT)
Dept: GENERAL RADIOLOGY | Age: 83
End: 2018-10-24
Payer: MEDICARE

## 2018-10-24 VITALS
WEIGHT: 186 LBS | TEMPERATURE: 97.4 F | HEART RATE: 66 BPM | BODY MASS INDEX: 34.23 KG/M2 | HEIGHT: 62 IN | DIASTOLIC BLOOD PRESSURE: 61 MMHG | OXYGEN SATURATION: 98 % | RESPIRATION RATE: 19 BRPM | SYSTOLIC BLOOD PRESSURE: 121 MMHG

## 2018-10-24 DIAGNOSIS — I47.1 PAROXYSMAL SUPRAVENTRICULAR TACHYCARDIA (HCC): Primary | ICD-10-CM

## 2018-10-24 LAB
A/G RATIO: 0.9 (ref 1.1–2.2)
ALBUMIN SERPL-MCNC: 3.8 G/DL (ref 3.4–5)
ALP BLD-CCNC: 73 U/L (ref 40–129)
ALT SERPL-CCNC: 14 U/L (ref 10–40)
ANION GAP SERPL CALCULATED.3IONS-SCNC: 12 MMOL/L (ref 3–16)
AST SERPL-CCNC: 17 U/L (ref 15–37)
BASOPHILS ABSOLUTE: 0 K/UL (ref 0–0.2)
BASOPHILS RELATIVE PERCENT: 0.7 %
BILIRUB SERPL-MCNC: 0.3 MG/DL (ref 0–1)
BUN BLDV-MCNC: 32 MG/DL (ref 7–20)
CALCIUM SERPL-MCNC: 9.9 MG/DL (ref 8.3–10.6)
CHLORIDE BLD-SCNC: 100 MMOL/L (ref 99–110)
CO2: 23 MMOL/L (ref 21–32)
CREAT SERPL-MCNC: 1.5 MG/DL (ref 0.6–1.2)
EOSINOPHILS ABSOLUTE: 0.1 K/UL (ref 0–0.6)
EOSINOPHILS RELATIVE PERCENT: 1.9 %
GFR AFRICAN AMERICAN: 40
GFR NON-AFRICAN AMERICAN: 33
GLOBULIN: 4.1 G/DL
GLUCOSE BLD-MCNC: 183 MG/DL (ref 70–99)
HCT VFR BLD CALC: 37.3 % (ref 36–48)
HEMOGLOBIN: 12 G/DL (ref 12–16)
LYMPHOCYTES ABSOLUTE: 2 K/UL (ref 1–5.1)
LYMPHOCYTES RELATIVE PERCENT: 32.4 %
MCH RBC QN AUTO: 30.2 PG (ref 26–34)
MCHC RBC AUTO-ENTMCNC: 32.3 G/DL (ref 31–36)
MCV RBC AUTO: 93.4 FL (ref 80–100)
MONOCYTES ABSOLUTE: 0.5 K/UL (ref 0–1.3)
MONOCYTES RELATIVE PERCENT: 8.3 %
NEUTROPHILS ABSOLUTE: 3.4 K/UL (ref 1.7–7.7)
NEUTROPHILS RELATIVE PERCENT: 56.7 %
PDW BLD-RTO: 14.3 % (ref 12.4–15.4)
PLATELET # BLD: 185 K/UL (ref 135–450)
PMV BLD AUTO: 8.5 FL (ref 5–10.5)
POTASSIUM REFLEX MAGNESIUM: 4.4 MMOL/L (ref 3.5–5.1)
RBC # BLD: 3.99 M/UL (ref 4–5.2)
SODIUM BLD-SCNC: 135 MMOL/L (ref 136–145)
TOTAL PROTEIN: 7.9 G/DL (ref 6.4–8.2)
TROPONIN: <0.01 NG/ML
WBC # BLD: 6 K/UL (ref 4–11)

## 2018-10-24 PROCEDURE — 80053 COMPREHEN METABOLIC PANEL: CPT

## 2018-10-24 PROCEDURE — 96365 THER/PROPH/DIAG IV INF INIT: CPT

## 2018-10-24 PROCEDURE — 71045 X-RAY EXAM CHEST 1 VIEW: CPT

## 2018-10-24 PROCEDURE — 2580000003 HC RX 258: Performed by: EMERGENCY MEDICINE

## 2018-10-24 PROCEDURE — 96366 THER/PROPH/DIAG IV INF ADDON: CPT

## 2018-10-24 PROCEDURE — 6360000002 HC RX W HCPCS: Performed by: EMERGENCY MEDICINE

## 2018-10-24 PROCEDURE — 85025 COMPLETE CBC W/AUTO DIFF WBC: CPT

## 2018-10-24 PROCEDURE — 93005 ELECTROCARDIOGRAM TRACING: CPT | Performed by: EMERGENCY MEDICINE

## 2018-10-24 PROCEDURE — 99285 EMERGENCY DEPT VISIT HI MDM: CPT

## 2018-10-24 PROCEDURE — 84484 ASSAY OF TROPONIN QUANT: CPT

## 2018-10-24 RX ORDER — 0.9 % SODIUM CHLORIDE 0.9 %
1000 INTRAVENOUS SOLUTION INTRAVENOUS ONCE
Status: COMPLETED | OUTPATIENT
Start: 2018-10-24 | End: 2018-10-24

## 2018-10-24 RX ORDER — ADENOSINE 3 MG/ML
6 INJECTION, SOLUTION INTRAVENOUS ONCE
Status: DISCONTINUED | OUTPATIENT
Start: 2018-10-24 | End: 2018-10-25 | Stop reason: HOSPADM

## 2018-10-24 RX ORDER — MAGNESIUM SULFATE IN WATER 40 MG/ML
2 INJECTION, SOLUTION INTRAVENOUS ONCE
Status: COMPLETED | OUTPATIENT
Start: 2018-10-24 | End: 2018-10-24

## 2018-10-24 RX ADMIN — MAGNESIUM SULFATE IN WATER 2 G: 40 INJECTION, SOLUTION INTRAVENOUS at 20:56

## 2018-10-24 RX ADMIN — SODIUM CHLORIDE 1000 ML: 9 INJECTION, SOLUTION INTRAVENOUS at 20:56

## 2018-10-25 LAB
EKG ATRIAL RATE: 136 BPM
EKG ATRIAL RATE: 69 BPM
EKG DIAGNOSIS: NORMAL
EKG DIAGNOSIS: NORMAL
EKG P AXIS: 78 DEGREES
EKG P-R INTERVAL: 210 MS
EKG Q-T INTERVAL: 294 MS
EKG Q-T INTERVAL: 380 MS
EKG QRS DURATION: 74 MS
EKG QRS DURATION: 74 MS
EKG QTC CALCULATION (BAZETT): 407 MS
EKG QTC CALCULATION (BAZETT): 453 MS
EKG R AXIS: -14 DEGREES
EKG R AXIS: -15 DEGREES
EKG T AXIS: 67 DEGREES
EKG T AXIS: 80 DEGREES
EKG VENTRICULAR RATE: 143 BPM
EKG VENTRICULAR RATE: 69 BPM

## 2018-10-25 PROCEDURE — 93010 ELECTROCARDIOGRAM REPORT: CPT | Performed by: INTERNAL MEDICINE

## 2018-10-25 NOTE — ED PROVIDER NOTES
mouth 2 times dailyHistorical Med      Cholecalciferol (VITAMIN D) 2000 units CAPS capsule Take by mouth DailyHistorical Med      carvedilol (COREG) 6.25 MG tablet Take 6.25 mg by mouth 2 times daily (with meals)Historical Med      estradiol (ESTRACE) 0.1 MG/GM vaginal cream Place 1 g vaginally Twice a Week Historical Med      Cyanocobalamin (VITAMIN B 12 PO) Take 1,000 Units by mouth daily       allopurinol (ZYLOPRIM) 100 MG tablet Take 100 mg by mouth daily      docusate sodium (COLACE) 100 MG capsule Take 100 mg by mouth 2 times daily      nitroGLYCERIN (NITROSTAT) 0.4 MG SL tablet Place 0.4 mg under the tongue every 5 minutes as needed (never  used). meclizine (ANTIVERT) 25 MG CHEW Take 25 mg by mouth 3 times daily as needed (not used recently). levothyroxine (SYNTHROID) 100 MCG tablet Take 100 mcg by mouth daily. aspirin 81 MG EC tablet Take 81 mg by mouth daily as needed (stopped 7 days prior to surgery). ALLERGIES     Ace inhibitors; Amoxicillin; Bactrim [sulfamethoxazole-trimethoprim]; Ciprofloxacin; Clindamycin/lincomycin; Naproxen; Statins; Hydrochlorothiazide; and Sulfa antibiotics    FAMILY HISTORY       Family History   Problem Relation Age of Onset    Cancer Brother         colon          SOCIAL HISTORY       Social History     Social History    Marital status:       Spouse name: N/A    Number of children: N/A    Years of education: N/A     Social History Main Topics    Smoking status: Former Smoker     Packs/day: 0.50     Years: 40.00     Types: Cigarettes     Quit date: 7/29/2000    Smokeless tobacco: Never Used      Comment: less than pack day    Alcohol use No    Drug use: No    Sexual activity: Not Asked     Other Topics Concern    None     Social History Narrative    None       SCREENINGS    [place] Coma Scale  Eye Opening: Spontaneous  Best Verbal Response: Oriented  Best Motor Response: Obeys commands  [place] Coma Scale Score: 15        PHYSICAL %    Lymphocytes % 32.4 %    Monocytes % 8.3 %    Eosinophils % 1.9 %    Basophils % 0.7 %    Neutrophils # 3.4 1.7 - 7.7 K/uL    Lymphocytes # 2.0 1.0 - 5.1 K/uL    Monocytes # 0.5 0.0 - 1.3 K/uL    Eosinophils # 0.1 0.0 - 0.6 K/uL    Basophils # 0.0 0.0 - 0.2 K/uL   Comprehensive Metabolic Panel w/ Reflex to MG   Result Value Ref Range    Sodium 135 (L) 136 - 145 mmol/L    Potassium reflex Magnesium 4.4 3.5 - 5.1 mmol/L    Chloride 100 99 - 110 mmol/L    CO2 23 21 - 32 mmol/L    Anion Gap 12 3 - 16    Glucose 183 (H) 70 - 99 mg/dL    BUN 32 (H) 7 - 20 mg/dL    CREATININE 1.5 (H) 0.6 - 1.2 mg/dL    GFR Non- 33 (A) >60    GFR  40 (A) >60    Calcium 9.9 8.3 - 10.6 mg/dL    Total Protein 7.9 6.4 - 8.2 g/dL    Alb 3.8 3.4 - 5.0 g/dL    Albumin/Globulin Ratio 0.9 (L) 1.1 - 2.2    Total Bilirubin 0.3 0.0 - 1.0 mg/dL    Alkaline Phosphatase 73 40 - 129 U/L    ALT 14 10 - 40 U/L    AST 17 15 - 37 U/L    Globulin 4.1 g/dL   Troponin   Result Value Ref Range    Troponin <0.01 <0.01 ng/mL       All other labs were within normal range or not returned as of this dictation. EKG: All EKG's are interpreted by the Emergency Department Physician who either signs or Co-signs this chart in the absence of a cardiologist.    SVT at 143. Normal axis. . Inferior and anteroseptal Q waves. Nonspecific EKG. RADIOLOGY:   Non-plain film images such as CT, Ultrasound and MRI are read by the radiologist. Plain radiographic images are visualized and preliminarily interpreted by the  ED Provider with the below findings:        Interpretation per the Radiologist below, if available at the time of this note:    XR CHEST PORTABLE   Final Result   No acute abnormality detected. No results found.       PROCEDURES   Unless otherwise noted below, none     Procedures    CRITICAL CARE TIME   The total Critical Care time is 30-75 minutes which excludes separately billable Nancy Gaming DO (electronically signed)           Daniel Holm DO  10/24/18 3031

## 2018-12-04 NOTE — Clinical Note
Patient Class: Inpatient [101]   REQUIRED: Diagnosis: Syncope and collapse [780. 2. ICD-9-CM]   Estimated Length of Stay: Estimated stay of more than 2 midnights   Future Attending Provider: Bryan Harris [[id number]]   Admitting Provider: Ricardo Bunch   Telemetry Bed Required?: Yes Pt is here for   Chief Complaint   Patient presents with    Follow-up     for constipation and knee pain     Fall     pt daughter states pt had a fall 2 weeks ago      Pt denies pain at this time. 1. Have you been to the ER, urgent care clinic since your last visit? Hospitalized since your last visit? No    2. Have you seen or consulted any other health care providers outside of the [address] [place] [address] since your last visit? Include any pap smears or colon screening.  No

## 2019-03-06 ENCOUNTER — APPOINTMENT (OUTPATIENT)
Dept: GENERAL RADIOLOGY | Age: 84
End: 2019-03-06
Payer: MEDICARE

## 2019-03-06 ENCOUNTER — APPOINTMENT (OUTPATIENT)
Dept: CT IMAGING | Age: 84
End: 2019-03-06
Payer: MEDICARE

## 2019-03-06 ENCOUNTER — HOSPITAL ENCOUNTER (EMERGENCY)
Age: 84
Discharge: HOME | End: 2019-03-06
Attending: EMERGENCY MEDICINE
Payer: MEDICARE

## 2019-03-06 VITALS
HEART RATE: 67 BPM | BODY MASS INDEX: 32.43 KG/M2 | TEMPERATURE: 98.8 F | RESPIRATION RATE: 14 BRPM | DIASTOLIC BLOOD PRESSURE: 85 MMHG | HEIGHT: 63 IN | SYSTOLIC BLOOD PRESSURE: 131 MMHG | WEIGHT: 183 LBS | OXYGEN SATURATION: 96 %

## 2019-03-06 DIAGNOSIS — R00.0 TACHYCARDIA: ICD-10-CM

## 2019-03-06 DIAGNOSIS — J06.9 ACUTE UPPER RESPIRATORY INFECTION: Primary | ICD-10-CM

## 2019-03-06 DIAGNOSIS — N39.0 URINARY TRACT INFECTION WITHOUT HEMATURIA, SITE UNSPECIFIED: ICD-10-CM

## 2019-03-06 LAB
A/G RATIO: 1 (ref 1.1–2.2)
ALBUMIN SERPL-MCNC: 3.9 G/DL (ref 3.4–5)
ALP BLD-CCNC: 62 U/L (ref 40–129)
ALT SERPL-CCNC: 11 U/L (ref 10–40)
ANION GAP SERPL CALCULATED.3IONS-SCNC: 11 MMOL/L (ref 3–16)
AST SERPL-CCNC: 14 U/L (ref 15–37)
BACTERIA: ABNORMAL /HPF
BASOPHILS ABSOLUTE: 0 K/UL (ref 0–0.2)
BASOPHILS RELATIVE PERCENT: 0.5 %
BILIRUB SERPL-MCNC: 0.3 MG/DL (ref 0–1)
BILIRUBIN URINE: NEGATIVE
BLOOD, URINE: NEGATIVE
BUN BLDV-MCNC: 24 MG/DL (ref 7–20)
CALCIUM SERPL-MCNC: 9.5 MG/DL (ref 8.3–10.6)
CHLORIDE BLD-SCNC: 101 MMOL/L (ref 99–110)
CLARITY: CLEAR
CO2: 23 MMOL/L (ref 21–32)
COLOR: YELLOW
CREAT SERPL-MCNC: 1.1 MG/DL (ref 0.6–1.2)
EOSINOPHILS ABSOLUTE: 0.1 K/UL (ref 0–0.6)
EOSINOPHILS RELATIVE PERCENT: 1.7 %
EPITHELIAL CELLS, UA: ABNORMAL /HPF
GFR AFRICAN AMERICAN: 57
GFR NON-AFRICAN AMERICAN: 47
GLOBULIN: 3.8 G/DL
GLUCOSE BLD-MCNC: 94 MG/DL (ref 70–99)
GLUCOSE URINE: NEGATIVE MG/DL
HCT VFR BLD CALC: 37.9 % (ref 36–48)
HEMOGLOBIN: 12.6 G/DL (ref 12–16)
KETONES, URINE: NEGATIVE MG/DL
LACTIC ACID: 1.1 MMOL/L (ref 0.4–2)
LEUKOCYTE ESTERASE, URINE: ABNORMAL
LYMPHOCYTES ABSOLUTE: 1.7 K/UL (ref 1–5.1)
LYMPHOCYTES RELATIVE PERCENT: 35 %
MCH RBC QN AUTO: 31.1 PG (ref 26–34)
MCHC RBC AUTO-ENTMCNC: 33.3 G/DL (ref 31–36)
MCV RBC AUTO: 93.4 FL (ref 80–100)
MICROSCOPIC EXAMINATION: YES
MONOCYTES ABSOLUTE: 0.6 K/UL (ref 0–1.3)
MONOCYTES RELATIVE PERCENT: 11.2 %
NEUTROPHILS ABSOLUTE: 2.6 K/UL (ref 1.7–7.7)
NEUTROPHILS RELATIVE PERCENT: 51.6 %
NITRITE, URINE: NEGATIVE
PDW BLD-RTO: 14.3 % (ref 12.4–15.4)
PH UA: 5.5 (ref 5–8)
PLATELET # BLD: 177 K/UL (ref 135–450)
PMV BLD AUTO: 9.1 FL (ref 5–10.5)
POTASSIUM SERPL-SCNC: 4.2 MMOL/L (ref 3.5–5.1)
PROTEIN UA: NEGATIVE MG/DL
RAPID INFLUENZA  B AGN: NEGATIVE
RAPID INFLUENZA A AGN: NEGATIVE
RBC # BLD: 4.06 M/UL (ref 4–5.2)
RBC UA: ABNORMAL /HPF (ref 0–2)
RENAL EPITHELIAL, UA: ABNORMAL /HPF
SODIUM BLD-SCNC: 135 MMOL/L (ref 136–145)
SPECIFIC GRAVITY UA: 1.01 (ref 1–1.03)
TOTAL PROTEIN: 7.7 G/DL (ref 6.4–8.2)
URINE TYPE: ABNORMAL
UROBILINOGEN, URINE: 0.2 E.U./DL
WBC # BLD: 5 K/UL (ref 4–11)
WBC UA: ABNORMAL /HPF (ref 0–5)

## 2019-03-06 PROCEDURE — 81001 URINALYSIS AUTO W/SCOPE: CPT

## 2019-03-06 PROCEDURE — 96361 HYDRATE IV INFUSION ADD-ON: CPT

## 2019-03-06 PROCEDURE — 99285 EMERGENCY DEPT VISIT HI MDM: CPT

## 2019-03-06 PROCEDURE — 96374 THER/PROPH/DIAG INJ IV PUSH: CPT

## 2019-03-06 PROCEDURE — 87804 INFLUENZA ASSAY W/OPTIC: CPT

## 2019-03-06 PROCEDURE — 2580000003 HC RX 258: Performed by: EMERGENCY MEDICINE

## 2019-03-06 PROCEDURE — 85025 COMPLETE CBC W/AUTO DIFF WBC: CPT

## 2019-03-06 PROCEDURE — 83605 ASSAY OF LACTIC ACID: CPT

## 2019-03-06 PROCEDURE — 6370000000 HC RX 637 (ALT 250 FOR IP): Performed by: EMERGENCY MEDICINE

## 2019-03-06 PROCEDURE — 70450 CT HEAD/BRAIN W/O DYE: CPT

## 2019-03-06 PROCEDURE — 93005 ELECTROCARDIOGRAM TRACING: CPT | Performed by: EMERGENCY MEDICINE

## 2019-03-06 PROCEDURE — 6360000002 HC RX W HCPCS: Performed by: EMERGENCY MEDICINE

## 2019-03-06 PROCEDURE — 87040 BLOOD CULTURE FOR BACTERIA: CPT

## 2019-03-06 PROCEDURE — 80053 COMPREHEN METABOLIC PANEL: CPT

## 2019-03-06 PROCEDURE — 71046 X-RAY EXAM CHEST 2 VIEWS: CPT

## 2019-03-06 PROCEDURE — 36415 COLL VENOUS BLD VENIPUNCTURE: CPT

## 2019-03-06 RX ORDER — NITROFURANTOIN 25; 75 MG/1; MG/1
100 CAPSULE ORAL 2 TIMES DAILY
Qty: 10 CAPSULE | Refills: 0 | Status: SHIPPED | OUTPATIENT
Start: 2019-03-06 | End: 2019-03-11

## 2019-03-06 RX ORDER — ACETAMINOPHEN 325 MG/1
650 TABLET ORAL ONCE
Status: COMPLETED | OUTPATIENT
Start: 2019-03-06 | End: 2019-03-06

## 2019-03-06 RX ORDER — 0.9 % SODIUM CHLORIDE 0.9 %
1000 INTRAVENOUS SOLUTION INTRAVENOUS ONCE
Status: COMPLETED | OUTPATIENT
Start: 2019-03-06 | End: 2019-03-06

## 2019-03-06 RX ORDER — DICLOFENAC SODIUM 25 MG/1
25 TABLET, DELAYED RELEASE ORAL 2 TIMES DAILY
Qty: 20 TABLET | Refills: 0 | Status: SHIPPED | OUTPATIENT
Start: 2019-03-06 | End: 2021-12-20

## 2019-03-06 RX ORDER — KETOROLAC TROMETHAMINE 30 MG/ML
15 INJECTION, SOLUTION INTRAMUSCULAR; INTRAVENOUS ONCE
Status: COMPLETED | OUTPATIENT
Start: 2019-03-06 | End: 2019-03-06

## 2019-03-06 RX ADMIN — KETOROLAC TROMETHAMINE 15 MG: 30 INJECTION, SOLUTION INTRAMUSCULAR; INTRAVENOUS at 18:46

## 2019-03-06 RX ADMIN — SODIUM CHLORIDE 1000 ML: 9 INJECTION, SOLUTION INTRAVENOUS at 17:29

## 2019-03-06 RX ADMIN — ACETAMINOPHEN 650 MG: 325 TABLET ORAL at 17:29

## 2019-03-06 ASSESSMENT — PAIN SCALES - GENERAL
PAINLEVEL_OUTOF10: 10
PAINLEVEL_OUTOF10: 8
PAINLEVEL_OUTOF10: 10
PAINLEVEL_OUTOF10: 7
PAINLEVEL_OUTOF10: 8

## 2019-03-06 ASSESSMENT — PAIN DESCRIPTION - LOCATION: LOCATION: HEAD

## 2019-03-08 LAB
EKG ATRIAL RATE: 138 BPM
EKG ATRIAL RATE: 60 BPM
EKG DIAGNOSIS: NORMAL
EKG DIAGNOSIS: NORMAL
EKG P AXIS: 71 DEGREES
EKG P-R INTERVAL: 194 MS
EKG P-R INTERVAL: 96 MS
EKG Q-T INTERVAL: 316 MS
EKG Q-T INTERVAL: 412 MS
EKG QRS DURATION: 78 MS
EKG QRS DURATION: 82 MS
EKG QTC CALCULATION (BAZETT): 412 MS
EKG QTC CALCULATION (BAZETT): 478 MS
EKG R AXIS: -26 DEGREES
EKG R AXIS: -29 DEGREES
EKG T AXIS: 39 DEGREES
EKG T AXIS: 51 DEGREES
EKG VENTRICULAR RATE: 138 BPM
EKG VENTRICULAR RATE: 60 BPM

## 2019-03-08 PROCEDURE — 93010 ELECTROCARDIOGRAM REPORT: CPT | Performed by: INTERNAL MEDICINE

## 2019-03-11 LAB
BLOOD CULTURE, ROUTINE: NORMAL
CULTURE, BLOOD 2: NORMAL

## 2019-06-22 ENCOUNTER — APPOINTMENT (OUTPATIENT)
Dept: GENERAL RADIOLOGY | Age: 84
DRG: 309 | End: 2019-06-22
Payer: MEDICARE

## 2019-06-22 ENCOUNTER — APPOINTMENT (OUTPATIENT)
Dept: CT IMAGING | Age: 84
DRG: 309 | End: 2019-06-22
Payer: MEDICARE

## 2019-06-22 ENCOUNTER — HOSPITAL ENCOUNTER (INPATIENT)
Age: 84
LOS: 3 days | Discharge: HOME HEALTH | DRG: 309 | End: 2019-06-25
Attending: EMERGENCY MEDICINE | Admitting: INTERNAL MEDICINE
Payer: MEDICARE

## 2019-06-22 DIAGNOSIS — K82.8 PORCELAIN GALLBLADDER: ICD-10-CM

## 2019-06-22 DIAGNOSIS — I20.9 ANGINA PECTORIS (HCC): Primary | ICD-10-CM

## 2019-06-22 PROBLEM — I24.9 ACUTE CORONARY SYNDROME (HCC): Status: ACTIVE | Noted: 2019-06-22

## 2019-06-22 LAB
A/G RATIO: 1 (ref 1.1–2.2)
ALBUMIN SERPL-MCNC: 4 G/DL (ref 3.4–5)
ALP BLD-CCNC: 68 U/L (ref 40–129)
ALT SERPL-CCNC: 14 U/L (ref 10–40)
ANION GAP SERPL CALCULATED.3IONS-SCNC: 13 MMOL/L (ref 3–16)
AST SERPL-CCNC: 17 U/L (ref 15–37)
BASOPHILS ABSOLUTE: 0.1 K/UL (ref 0–0.2)
BASOPHILS RELATIVE PERCENT: 0.6 %
BILIRUB SERPL-MCNC: 0.4 MG/DL (ref 0–1)
BILIRUBIN URINE: NEGATIVE
BLOOD, URINE: NEGATIVE
BUN BLDV-MCNC: 30 MG/DL (ref 7–20)
CALCIUM SERPL-MCNC: 9.8 MG/DL (ref 8.3–10.6)
CHLORIDE BLD-SCNC: 97 MMOL/L (ref 99–110)
CLARITY: CLEAR
CO2: 22 MMOL/L (ref 21–32)
COLOR: YELLOW
CREAT SERPL-MCNC: 1.2 MG/DL (ref 0.6–1.2)
EKG ATRIAL RATE: 77 BPM
EKG DIAGNOSIS: NORMAL
EKG P AXIS: 74 DEGREES
EKG P-R INTERVAL: 178 MS
EKG Q-T INTERVAL: 380 MS
EKG QRS DURATION: 78 MS
EKG QTC CALCULATION (BAZETT): 430 MS
EKG R AXIS: -22 DEGREES
EKG T AXIS: 46 DEGREES
EKG VENTRICULAR RATE: 77 BPM
EOSINOPHILS ABSOLUTE: 0 K/UL (ref 0–0.6)
EOSINOPHILS RELATIVE PERCENT: 0.2 %
GFR AFRICAN AMERICAN: 51
GFR NON-AFRICAN AMERICAN: 42
GLOBULIN: 4.2 G/DL
GLUCOSE BLD-MCNC: 137 MG/DL (ref 70–99)
GLUCOSE URINE: NEGATIVE MG/DL
HCT VFR BLD CALC: 36.8 % (ref 36–48)
HEMOGLOBIN: 12.1 G/DL (ref 12–16)
KETONES, URINE: NEGATIVE MG/DL
LEUKOCYTE ESTERASE, URINE: NEGATIVE
LIPASE: 26 U/L (ref 13–60)
LYMPHOCYTES ABSOLUTE: 1.5 K/UL (ref 1–5.1)
LYMPHOCYTES RELATIVE PERCENT: 12.7 %
MCH RBC QN AUTO: 30.7 PG (ref 26–34)
MCHC RBC AUTO-ENTMCNC: 33 G/DL (ref 31–36)
MCV RBC AUTO: 92.9 FL (ref 80–100)
MICROSCOPIC EXAMINATION: NORMAL
MONOCYTES ABSOLUTE: 0.9 K/UL (ref 0–1.3)
MONOCYTES RELATIVE PERCENT: 7.6 %
NEUTROPHILS ABSOLUTE: 9.6 K/UL (ref 1.7–7.7)
NEUTROPHILS RELATIVE PERCENT: 78.9 %
NITRITE, URINE: NEGATIVE
PDW BLD-RTO: 14.8 % (ref 12.4–15.4)
PH UA: 5.5 (ref 5–8)
PLATELET # BLD: 181 K/UL (ref 135–450)
PMV BLD AUTO: 8.4 FL (ref 5–10.5)
POTASSIUM SERPL-SCNC: 4.6 MMOL/L (ref 3.5–5.1)
PRO-BNP: 2017 PG/ML (ref 0–449)
PROTEIN UA: NEGATIVE MG/DL
RBC # BLD: 3.96 M/UL (ref 4–5.2)
SODIUM BLD-SCNC: 132 MMOL/L (ref 136–145)
SPECIFIC GRAVITY UA: <=1.005 (ref 1–1.03)
TOTAL PROTEIN: 8.2 G/DL (ref 6.4–8.2)
TROPONIN: <0.01 NG/ML
URINE REFLEX TO CULTURE: NORMAL
URINE TYPE: NORMAL
UROBILINOGEN, URINE: 0.2 E.U./DL
WBC # BLD: 12.2 K/UL (ref 4–11)

## 2019-06-22 PROCEDURE — 84484 ASSAY OF TROPONIN QUANT: CPT

## 2019-06-22 PROCEDURE — 83880 ASSAY OF NATRIURETIC PEPTIDE: CPT

## 2019-06-22 PROCEDURE — 71260 CT THORAX DX C+: CPT

## 2019-06-22 PROCEDURE — 85025 COMPLETE CBC W/AUTO DIFF WBC: CPT

## 2019-06-22 PROCEDURE — 93005 ELECTROCARDIOGRAM TRACING: CPT | Performed by: EMERGENCY MEDICINE

## 2019-06-22 PROCEDURE — 2580000003 HC RX 258: Performed by: INTERNAL MEDICINE

## 2019-06-22 PROCEDURE — 6360000002 HC RX W HCPCS: Performed by: INTERNAL MEDICINE

## 2019-06-22 PROCEDURE — 36415 COLL VENOUS BLD VENIPUNCTURE: CPT

## 2019-06-22 PROCEDURE — 6360000004 HC RX CONTRAST MEDICATION: Performed by: EMERGENCY MEDICINE

## 2019-06-22 PROCEDURE — 96372 THER/PROPH/DIAG INJ SC/IM: CPT

## 2019-06-22 PROCEDURE — 1200000000 HC SEMI PRIVATE

## 2019-06-22 PROCEDURE — 83690 ASSAY OF LIPASE: CPT

## 2019-06-22 PROCEDURE — 6370000000 HC RX 637 (ALT 250 FOR IP): Performed by: INTERNAL MEDICINE

## 2019-06-22 PROCEDURE — 80053 COMPREHEN METABOLIC PANEL: CPT

## 2019-06-22 PROCEDURE — 71045 X-RAY EXAM CHEST 1 VIEW: CPT

## 2019-06-22 PROCEDURE — 6370000000 HC RX 637 (ALT 250 FOR IP): Performed by: EMERGENCY MEDICINE

## 2019-06-22 PROCEDURE — 74177 CT ABD & PELVIS W/CONTRAST: CPT

## 2019-06-22 PROCEDURE — 81003 URINALYSIS AUTO W/O SCOPE: CPT

## 2019-06-22 PROCEDURE — 99285 EMERGENCY DEPT VISIT HI MDM: CPT

## 2019-06-22 PROCEDURE — 93010 ELECTROCARDIOGRAM REPORT: CPT | Performed by: INTERNAL MEDICINE

## 2019-06-22 RX ORDER — LIDOCAINE 4 G/G
1 PATCH TOPICAL DAILY
Status: DISCONTINUED | OUTPATIENT
Start: 2019-06-22 | End: 2019-06-25 | Stop reason: HOSPADM

## 2019-06-22 RX ORDER — ASPIRIN 81 MG/1
81 TABLET ORAL DAILY PRN
Status: DISCONTINUED | OUTPATIENT
Start: 2019-06-22 | End: 2019-06-25 | Stop reason: HOSPADM

## 2019-06-22 RX ORDER — ATORVASTATIN CALCIUM 10 MG/1
20 TABLET, FILM COATED ORAL DAILY
Status: DISCONTINUED | OUTPATIENT
Start: 2019-06-22 | End: 2019-06-25 | Stop reason: HOSPADM

## 2019-06-22 RX ORDER — ASPIRIN 81 MG/1
81 TABLET, CHEWABLE ORAL DAILY
Status: DISCONTINUED | OUTPATIENT
Start: 2019-06-24 | End: 2019-06-25 | Stop reason: HOSPADM

## 2019-06-22 RX ORDER — ONDANSETRON 2 MG/ML
4 INJECTION INTRAMUSCULAR; INTRAVENOUS EVERY 6 HOURS PRN
Status: DISCONTINUED | OUTPATIENT
Start: 2019-06-22 | End: 2019-06-25 | Stop reason: HOSPADM

## 2019-06-22 RX ORDER — DOCUSATE SODIUM 100 MG/1
100 CAPSULE, LIQUID FILLED ORAL 2 TIMES DAILY
Status: DISCONTINUED | OUTPATIENT
Start: 2019-06-22 | End: 2019-06-25 | Stop reason: HOSPADM

## 2019-06-22 RX ORDER — SODIUM CHLORIDE 0.9 % (FLUSH) 0.9 %
10 SYRINGE (ML) INJECTION PRN
Status: DISCONTINUED | OUTPATIENT
Start: 2019-06-22 | End: 2019-06-25 | Stop reason: HOSPADM

## 2019-06-22 RX ORDER — VALSARTAN 80 MG/1
40 TABLET ORAL DAILY
Status: DISCONTINUED | OUTPATIENT
Start: 2019-06-22 | End: 2019-06-25 | Stop reason: HOSPADM

## 2019-06-22 RX ORDER — SODIUM CHLORIDE 0.9 % (FLUSH) 0.9 %
10 SYRINGE (ML) INJECTION EVERY 12 HOURS SCHEDULED
Status: DISCONTINUED | OUTPATIENT
Start: 2019-06-22 | End: 2019-06-25 | Stop reason: HOSPADM

## 2019-06-22 RX ORDER — TROSPIUM CHLORIDE 20 MG/1
20 TABLET, FILM COATED ORAL
Status: DISCONTINUED | OUTPATIENT
Start: 2019-06-22 | End: 2019-06-25 | Stop reason: HOSPADM

## 2019-06-22 RX ORDER — ALLOPURINOL 100 MG/1
100 TABLET ORAL DAILY
Status: DISCONTINUED | OUTPATIENT
Start: 2019-06-22 | End: 2019-06-25 | Stop reason: HOSPADM

## 2019-06-22 RX ORDER — LEVOTHYROXINE SODIUM 0.05 MG/1
100 TABLET ORAL DAILY
Status: DISCONTINUED | OUTPATIENT
Start: 2019-06-22 | End: 2019-06-25 | Stop reason: HOSPADM

## 2019-06-22 RX ORDER — ACETAMINOPHEN 325 MG/1
650 TABLET ORAL EVERY 4 HOURS PRN
Status: DISCONTINUED | OUTPATIENT
Start: 2019-06-22 | End: 2019-06-25 | Stop reason: HOSPADM

## 2019-06-22 RX ORDER — ASPIRIN 81 MG/1
324 TABLET, CHEWABLE ORAL ONCE
Status: COMPLETED | OUTPATIENT
Start: 2019-06-22 | End: 2019-06-22

## 2019-06-22 RX ORDER — CARVEDILOL 6.25 MG/1
6.25 TABLET ORAL 2 TIMES DAILY WITH MEALS
Status: DISCONTINUED | OUTPATIENT
Start: 2019-06-22 | End: 2019-06-23

## 2019-06-22 RX ORDER — SPIRONOLACTONE 25 MG/1
25 TABLET ORAL DAILY
Status: DISCONTINUED | OUTPATIENT
Start: 2019-06-22 | End: 2019-06-25 | Stop reason: HOSPADM

## 2019-06-22 RX ADMIN — VITAMIN D, TAB 1000IU (100/BT) 2000 UNITS: 25 TAB at 13:50

## 2019-06-22 RX ADMIN — SPIRONOLACTONE 25 MG: 25 TABLET ORAL at 13:50

## 2019-06-22 RX ADMIN — ALLOPURINOL 100 MG: 100 TABLET ORAL at 13:50

## 2019-06-22 RX ADMIN — ATORVASTATIN CALCIUM 20 MG: 10 TABLET, FILM COATED ORAL at 20:33

## 2019-06-22 RX ADMIN — CARVEDILOL 6.25 MG: 6.25 TABLET, FILM COATED ORAL at 17:29

## 2019-06-22 RX ADMIN — PSYLLIUM HUSK 1 PACKET: 3.4 POWDER ORAL at 13:49

## 2019-06-22 RX ADMIN — Medication 10 ML: at 20:33

## 2019-06-22 RX ADMIN — VALSARTAN 40 MG: 80 TABLET, FILM COATED ORAL at 13:50

## 2019-06-22 RX ADMIN — ENOXAPARIN SODIUM 40 MG: 40 INJECTION SUBCUTANEOUS at 13:49

## 2019-06-22 RX ADMIN — IOPAMIDOL 75 ML: 755 INJECTION, SOLUTION INTRAVENOUS at 09:52

## 2019-06-22 RX ADMIN — TROSPIUM CHLORIDE 20 MG: 20 TABLET, FILM COATED ORAL at 17:29

## 2019-06-22 RX ADMIN — ASPIRIN 81 MG 324 MG: 81 TABLET ORAL at 11:55

## 2019-06-22 RX ADMIN — DOCUSATE SODIUM 100 MG: 100 CAPSULE, LIQUID FILLED ORAL at 13:50

## 2019-06-22 RX ADMIN — DOCUSATE SODIUM 100 MG: 100 CAPSULE, LIQUID FILLED ORAL at 20:33

## 2019-06-22 ASSESSMENT — PAIN DESCRIPTION - PAIN TYPE: TYPE: ACUTE PAIN

## 2019-06-22 ASSESSMENT — PAIN SCALES - GENERAL: PAINLEVEL_OUTOF10: 6

## 2019-06-22 ASSESSMENT — PAIN DESCRIPTION - LOCATION: LOCATION: HEAD

## 2019-06-22 NOTE — ED NOTES
Bed: 06  Expected date:   Expected time:   Means of arrival:   Comments:  m46     Tamir Ponce RN  06/22/19 0616

## 2019-06-22 NOTE — ED NOTES
Dr. Sharyn Lai at bedside updating patient and patient family.      Mark Schneider RN  06/22/19 4180

## 2019-06-22 NOTE — H&P
Hospital Medicine  History and Physical    Patient:  Rene Singleton  MRN: [id number]    CHIEF COMPLAINT:  Nausea and constipation, feeling clammy and short of breath    History Obtained From:  patient, family members, electronic medical record  Primary Care Physician: Kelly Adair MD    HISTORY OF PRESENT ILLNESS:   The patient is a 80 y.o. female with a history of CAD (stent 2007) who awakened around 0200 today with dyspnea, clammy, lightheadedness, and nausea and emesis overnight. Recent history of constipation with no bowel movement in two days. She says she is having trouble explaining what happened to her at 0200. She felt \"heavy all over\" with some neck and upper chest discomfort. It reminded her of her previous heart attack requiring a stent 10 years ago. She also says her HR was 180's at the time when she checked at home. Has history of SVT and she tried valsalva and deep breathing which appears to have resulted in return to sinus rhythm. Quit smoking in 2000 after about a 20 pk/yr history.      Past Medical History:      Diagnosis Date    Arthritis     CAD (coronary artery disease)     stent placed 2007    CHF (congestive heart failure) ([address]     Depression 1980    treated with electoshock successfully    Diphtheria     age 15   [facility] Gout     Hyperlipemia     Hypertension     STEMI ([facility] elevation myocardial infarction) [facility] [address] 2007    Thyroid disease     Wears hearing aid     left ear       Past Surgical History:      Procedure Laterality Date    APPENDECTOMY      BREAST SURGERY      biopsy benign    CARDIAC SURGERY  2007    stent    CATARACT REMOVAL WITH IMPLANT Right 10/11/2017    COLONOSCOPY  4/29/2013    HYSTERECTOMY      JOINT REPLACEMENT Left 8/13/14    LEFT TOTAL KNEE REPLACEMENT WITH FEMORAL NERVE BLOCK FOR PAIN    OTHER SURGICAL HISTORY Right 1/6/14    right total knee replacement    TONSILLECTOMY         Medications Prior to Admission:    Medication Sig   diclofenac (VOLTAREN) 25 MG EC tablet Take 1 tablet by mouth 2 times daily   valsartan (DIOVAN) 40 MG tablet Take 1 tablet by mouth daily   lidocaine (LIDODERM) 5 % Place 2 patches onto the skin daily 12 hours on, 12 hours off.   spironolactone (ALDACTONE) 25 MG tablet Take 1 tablet by mouth daily   Acetaminophen 325 MG CAPS Take 650 mg by mouth 3 times daily   hydrocortisone (ANUSOL-HC) 2.5 % rectal cream Place rectally 2 times daily Place rectally 2 times daily. psyllium (KONSYL) 28.3 % PACK Take 1 packet by mouth daily   NONFORMULARY    atorvastatin (LIPITOR) 20 MG tablet Take 20 mg by mouth daily   methenamine (MANDELAMINE) 1 g tablet Take 1 g by mouth 2 times daily   Cholecalciferol (VITAMIN D) 2000 units CAPS capsule Take by mouth Daily   carvedilol (COREG) 6.25 MG tablet Take 6.25 mg by mouth 2 times daily (with meals)   estradiol (ESTRACE) 0.1 MG/GM vaginal cream Place 1 g vaginally Twice a Week    Cyanocobalamin (VITAMIN B 12 PO) Take 1,000 Units by mouth daily    allopurinol (ZYLOPRIM) 100 MG tablet Take 100 mg by mouth daily   docusate sodium (COLACE) 100 MG capsule Take 100 mg by mouth 2 times daily   nitroGLYCERIN (NITROSTAT) 0.4 MG SL tablet Place 0.4 mg under the tongue every 5 minutes as needed (never  used). meclizine (ANTIVERT) 25 MG CHEW Take 25 mg by mouth 3 times daily as needed (not used recently). levothyroxine (SYNTHROID) 100 MCG tablet Take 100 mcg by mouth daily. aspirin 81 MG EC tablet Take 81 mg by mouth daily as needed (stopped 7 days prior to surgery). tolterodine (DETROL LA) 2 MG extended release capsule Take 2 mg by mouth Daily with supper       Allergies:  Ace inhibitors; Amoxicillin; Bactrim [sulfamethoxazole-trimethoprim]; Ciprofloxacin; Clindamycin/lincomycin; Naproxen; Statins; Hydrochlorothiazide; and Sulfa antibiotics    Social History:   TOBACCO:   reports that she quit smoking about 18 years ago. Her smoking use included cigarettes. She has a 20.00 pack-year smoking history. Calcification of the wall of the fundus of the gallbladder with contraction of the body. Bile ducts normal in caliber. Duodenal diverticula. Colonic diverticula with no diverticular inflammatory stranding. Appendix not visualized. Calcified mesenteric lymph nodes. Uterus surgically absent. Urinary bladder unremarkable. No pelvic fluid. No ascites or pneumoperitoneum. Aorta normal in caliber. No retroperitoneal adenopathy     No evidence of pulmonary embolism. No acute cardiopulmonary process demonstrated Calcific coronary atherosclerosis. Stable right lung nodule  No acute abdominopelvic process demonstrated. Porcelain gallbladder. Colonic and duodenal diverticulosis     Portable CXR: There is incomplete inspiration resulting in vascular crowding and slight haziness of the lungs. Heart size borderline. Crowding of the pulmonary vessels due to incomplete inspiration. Tortuous thoracic aorta. Poor inspiratory effort. Would be difficult to rule out mild edema     EKG: sinus rhythm (HR 77) with sinus arrhythmia. Intervals: normal [place], narrow QRS, normal QTc. ST segments: no ST elevations or depressions. Non-specific T wave changes unchanged from prior EKG 3/6/19. Assessment and Plan   1. Chest pain with known CAD s/p stent:  Admit to inpatient status. Cardiology evaluation. Initial troponin T negative, serial troponin measurements. Continue aspirin 81 mg, carvedilol 6.125 mg BID, and atorvastatin. Clear liquids until seen by cardiology. 2. History of SVT:  Telemetry monitoring. Cardiology evaluation. Very likely episode of SVT early this morning resulting in symptoms, with spontaneous resolution. 3. Hypertension:  Continues on spironolactone 25 mg and valsartan 40 mg daily. 4. Dyslipidemia:  Continue atorvastatin 20 mg nightly. 5. History of gout:  Continue allopurinol 100 mg daily. Check uric acid only if flare. 6. Hypovolemic hyponatremia:  Volume expansion with normal saline.

## 2019-06-22 NOTE — PROGRESS NOTES
Patient telemetry now with irregular rate with no visible P waves. Dr. Jazmin Edward notified via secure message.

## 2019-06-22 NOTE — PROGRESS NOTES
Patient admitted to room 102-1 from emergency department. Patient oriented to room, call light, bed rails, phone, lights and bathroom. Patient instructed about the schedule of the day including: vital sign frequency, lab draws, possible tests, frequency of MD and staff rounds, including RN/MD rounding together at bedside, daily weights, and I &O's. Patient instructed about prescribed diet, how to use 8MENU, and television. Telemetry box in place, patient aware of placement and reason. Bed locked, in lowest position, side rails up 2/4, call light within reach. Pulse elevated at 142 BPM on arrival to floor, secure message sent to Dr. Rama Romero.

## 2019-06-22 NOTE — ED PROVIDER NOTES
[facility] Emergency Department    CHIEF COMPLAINT  Chief Complaint   Patient presents with    Nausea     Since last night with 1 episode of emesis    Constipation     No bowel movement in 2 days. HISTORY OF PRESENT ILLNESS  Kamala Taveras is a 80 y.o. female  who presents to the ED complaining of feeling clammy and short of breath at 0200 this morning. The symptoms woke her up from sleep. She had nausea, vomiting x1. She denied abdominal pains. Says she is constipated x2 days as well. She says she is having trouble explaining what happened to her at 0200. She felt \"heavy all over\" with some neck and upper chest discomfort. It reminded her of her previous heart attack. She also says her HR was 180's at the time when she checked at home. Has history of SVT. She feels a lot better now than she did when the sx were going on at home and lasted at least a few hours. No other complaints, modifying factors or associated symptoms. I have reviewed the following from the nursing documentation.     Past Medical History:   Diagnosis Date    Arthritis     CAD (coronary artery disease)     stent placed 2007    CHF (congestive heart failure) ([address]     Depression 1980    treated with electoshock successfully    Diphtheria     age 15   [place] Mink Gout     Hyperlipemia     Hypertension     STEMI (ST elevation myocardial infarction) ([address] 2007    Thyroid disease     Wears hearing aid     left ear     Past Surgical History:   Procedure Laterality Date    APPENDECTOMY      BREAST SURGERY      biopsy benign    CARDIAC SURGERY  2007    stent    CATARACT REMOVAL WITH IMPLANT Right 10/11/2017    COLONOSCOPY  4/29/2013    HYSTERECTOMY      JOINT REPLACEMENT Left 8/13/14    LEFT TOTAL KNEE REPLACEMENT WITH FEMORAL NERVE BLOCK FOR PAIN    OTHER SURGICAL HISTORY Right 1/6/14    right total knee replacement    TONSILLECTOMY       Family History   Problem Relation Age of Onset    Cancer tablet by mouth daily 30 tablet 3    lidocaine (LIDODERM) 5 % Place 2 patches onto the skin daily 12 hours on, 12 hours off. 30 patch 0    spironolactone (ALDACTONE) 25 MG tablet Take 1 tablet by mouth daily 30 tablet 3    Acetaminophen 325 MG CAPS Take 650 mg by mouth 3 times daily      hydrocortisone (ANUSOL-HC) 2.5 % rectal cream Place rectally 2 times daily Place rectally 2 times daily.  psyllium (KONSYL) 28.3 % PACK Take 1 packet by mouth daily      NONFORMULARY       atorvastatin (LIPITOR) 20 MG tablet Take 20 mg by mouth daily      methenamine (MANDELAMINE) 1 g tablet Take 1 g by mouth 2 times daily      Cholecalciferol (VITAMIN D) 2000 units CAPS capsule Take by mouth Daily      carvedilol (COREG) 6.25 MG tablet Take 6.25 mg by mouth 2 times daily (with meals)      estradiol (ESTRACE) 0.1 MG/GM vaginal cream Place 1 g vaginally Twice a Week       Cyanocobalamin (VITAMIN B 12 PO) Take 1,000 Units by mouth daily       allopurinol (ZYLOPRIM) 100 MG tablet Take 100 mg by mouth daily      docusate sodium (COLACE) 100 MG capsule Take 100 mg by mouth 2 times daily      nitroGLYCERIN (NITROSTAT) 0.4 MG SL tablet Place 0.4 mg under the tongue every 5 minutes as needed (never  used).  meclizine (ANTIVERT) 25 MG CHEW Take 25 mg by mouth 3 times daily as needed (not used recently).  levothyroxine (SYNTHROID) 100 MCG tablet Take 100 mcg by mouth daily.  aspirin 81 MG EC tablet Take 81 mg by mouth daily as needed (stopped 7 days prior to surgery).       tolterodine (DETROL LA) 2 MG extended release capsule Take 2 mg by mouth Daily with supper       Allergies   Allergen Reactions    Ace Inhibitors Other (See Comments)     cough    Amoxicillin Hives     Hives    Bactrim [Sulfamethoxazole-Trimethoprim]     Ciprofloxacin     Clindamycin/Lincomycin     Naproxen Nausea Only     Nausea and stomach pain    Statins Other (See Comments)     myalgia  unknown    Hydrochlorothiazide Rash    Comprehensive metabolic panel   Result Value Ref Range    Sodium 132 (L) 136 - 145 mmol/L    Potassium 4.6 3.5 - 5.1 mmol/L    Chloride 97 (L) 99 - 110 mmol/L    CO2 22 21 - 32 mmol/L    Anion Gap 13 3 - 16    Glucose 137 (H) 70 - 99 mg/dL    BUN 30 (H) 7 - 20 mg/dL    CREATININE 1.2 0.6 - 1.2 mg/dL    GFR Non-African American 42 (A) >60    GFR  51 (A) >60    Calcium 9.8 8.3 - 10.6 mg/dL    Total Protein 8.2 6.4 - 8.2 g/dL    Alb 4.0 3.4 - 5.0 g/dL    Albumin/Globulin Ratio 1.0 (L) 1.1 - 2.2    Total Bilirubin 0.4 0.0 - 1.0 mg/dL    Alkaline Phosphatase 68 40 - 129 U/L    ALT 14 10 - 40 U/L    AST 17 15 - 37 U/L    Globulin 4.2 g/dL   Lipase   Result Value Ref Range    Lipase 26.0 13.0 - 60.0 U/L   Troponin   Result Value Ref Range    Troponin <0.01 <0.01 ng/mL   Brain Natriuretic Peptide   Result Value Ref Range    Pro-BNP 2,017 (H) 0 - 449 pg/mL   EKG 12 Lead   Result Value Ref Range    Ventricular Rate 77 BPM    Atrial Rate 77 BPM    P-R Interval 178 ms    QRS Duration 78 ms    Q-T Interval 380 ms    QTc Calculation (Bazett) 430 ms    P Axis 74 degrees    R Axis -22 degrees    T Axis 46 degrees    Diagnosis       Normal sinus rhythm with sinus arrhythmiaLow voltage QRSCannot rule out Anteroseptal infarct (cited on or before 30-DEC-2014)Abnormal ECGWhen compared with ECG of 06-MAR-2019 14:35,No significant change was found     The 12 lead EKG was interpreted by me as follows:  Rate: normal with a rate of 77  Rhythm: sinus with sinus arrhythmia  Axis: normal  Intervals: normal CT, narrow QRS, normal QTc  ST segments: no ST elevations or depressions  T waves: no abnormal inversions  Non-specific T wave changes: present  Prior EKG comparison: EKG dated 3/6/19 is not significantly different      RADIOLOGY    Ct Abdomen Pelvis W Iv Contrast Additional Contrast? None    Result Date: 6/22/2019  EXAMINATION: CTA OF THE CHEST; CT OF THE ABDOMEN AND PELVIS WITH CONTRAST 6/22/2019 9:40 am; 6/22/2019 9:43 am TECHNIQUE: CTA of the chest was performed after the administration of intravenous contrast.  Multiplanar reformatted images are provided for review. MIP images are provided for review. Dose modulation, iterative reconstruction, and/or weight based adjustment of the mA/kV was utilized to reduce the radiation dose to as low as reasonably achievable.; CT of the abdomen and pelvis was performed with the administration of intravenous contrast. Multiplanar reformatted images are provided for review. Dose modulation, iterative reconstruction, and/or weight based adjustment of the mA/kV was utilized to reduce the radiation dose to as low as reasonably achievable. COMPARISON: CT chest abdomen and pelvis 08/05/2018 HISTORY: ORDERING SYSTEM PROVIDED HISTORY: concern for angina, neck discomfort/clammy episode, ?edema on CXR TECHNOLOGIST PROVIDED HISTORY: Ordering Physician Provided Reason for Exam: coughX 2 days, tachychardic, SOB, weakness that started overnight, no hx of clot Acuity: Acute Type of Exam: Initial Additional signs and symptoms: possible edema on CXR,  Former Smoker (Quit Date: 07/29/2000), 0.5 ppd, 20 pack-years; ORDERING SYSTEM PROVIDED HISTORY: constipation, vomiting, leukocytosis TECHNOLOGIST PROVIDED HISTORY: If patient is on cardiac monitor and/or pulse ox, they may be taken off cardiac monitor and pulse ox, left on O2 if currently on. All monitors reattached when patient returns to room. Additional Contrast?->None Ordering Physician Provided Reason for Exam: N/V X1, constipation Acuity: Acute Type of Exam: Initial Additional signs and symptoms: prev hysterectomy, appendectomy FINDINGS: Chest: Pulmonary arteries: Pulmonary arteries are well opacified. No emboli are demonstrated. Mediastinum: Thoracic aorta normal in caliber. No pericardial effusion. No mediastinal adenopathy. Coronary artery calcification Lungs/pleura: Some distortion of the lung bases due to patient breathing motion.   No airspace disease. Right pulmonary nodule. No pleural effusion Soft Tissues/Bones: No acute bony abnormality Abdomen/Pelvis: Organs: Solid organs unremarkable. Calcification of the wall of the fundus of the gallbladder with contraction of the body. Bile ducts normal in caliber GI/Bowel: Duodenal diverticula. Colonic diverticula with no diverticular inflammatory stranding. Appendix not visualized. Calcified mesenteric lymph nodes Pelvis: Uterus surgically absent. Urinary bladder unremarkable. No pelvic fluid Peritoneum/Retroperitoneum: No ascites or pneumoperitoneum. Aorta normal in caliber. No retroperitoneal adenopathy Bones/Soft Tissues: No acute bony abnormality     1. No evidence of pulmonary embolism 2. No acute cardiopulmonary process demonstrated 3. Calcific coronary atherosclerosis 4. Stable right lung nodule 5. No acute abdominopelvic process demonstrated 6. Porcelain gallbladder 7. Colonic and duodenal diverticulosis     Xr Chest Portable    Result Date: 6/22/2019  EXAMINATION: ONE XRAY VIEW OF THE CHEST 6/22/2019 8:52 am COMPARISON: 03/06/2019 HISTORY: ORDERING SYSTEM PROVIDED HISTORY: angina TECHNOLOGIST PROVIDED HISTORY: Reason for exam:->angina Ordering Physician Provided Reason for Exam: NAUSEA Acuity: Acute Type of Exam: Initial FINDINGS: There is incomplete inspiration resulting in vascular crowding and slight haziness of the lungs. Heart size borderline. Crowding of the pulmonary vessels due to incomplete inspiration. Tortuous thoracic aorta     Poor inspiratory effort. Would be difficult to rule out mild edema     Ct Chest Pulmonary Embolism W Contrast    Result Date: 6/22/2019  EXAMINATION: CTA OF THE CHEST; CT OF THE ABDOMEN AND PELVIS WITH CONTRAST 6/22/2019 9:40 am; 6/22/2019 9:43 am TECHNIQUE: CTA of the chest was performed after the administration of intravenous contrast.  Multiplanar reformatted images are provided for review. MIP images are provided for review.  Dose modulation, iterative reconstruction, and/or weight based adjustment of the mA/kV was utilized to reduce the radiation dose to as low as reasonably achievable.; CT of the abdomen and pelvis was performed with the administration of intravenous contrast. Multiplanar reformatted images are provided for review. Dose modulation, iterative reconstruction, and/or weight based adjustment of the mA/kV was utilized to reduce the radiation dose to as low as reasonably achievable. COMPARISON: CT chest abdomen and pelvis 08/05/2018 HISTORY: ORDERING SYSTEM PROVIDED HISTORY: concern for angina, neck discomfort/clammy episode, ?edema on CXR TECHNOLOGIST PROVIDED HISTORY: Ordering Physician Provided Reason for Exam: coughX 2 days, tachychardic, SOB, weakness that started overnight, no hx of clot Acuity: Acute Type of Exam: Initial Additional signs and symptoms: possible edema on CXR,  Former Smoker (Quit Date: 07/29/2000), 0.5 ppd, 20 pack-years; ORDERING SYSTEM PROVIDED HISTORY: constipation, vomiting, leukocytosis TECHNOLOGIST PROVIDED HISTORY: If patient is on cardiac monitor and/or pulse ox, they may be taken off cardiac monitor and pulse ox, left on O2 if currently on. All monitors reattached when patient returns to room. Additional Contrast?->None Ordering Physician Provided Reason for Exam: N/V X1, constipation Acuity: Acute Type of Exam: Initial Additional signs and symptoms: prev hysterectomy, appendectomy FINDINGS: Chest: Pulmonary arteries: Pulmonary arteries are well opacified. No emboli are demonstrated. Mediastinum: Thoracic aorta normal in caliber. No pericardial effusion. No mediastinal adenopathy. Coronary artery calcification Lungs/pleura: Some distortion of the lung bases due to patient breathing motion. No airspace disease. Right pulmonary nodule. No pleural effusion Soft Tissues/Bones: No acute bony abnormality Abdomen/Pelvis: Organs: Solid organs unremarkable.   Calcification of the wall of the fundus of the gallbladder with contraction of the body. Bile ducts normal in caliber GI/Bowel: Duodenal diverticula. Colonic diverticula with no diverticular inflammatory stranding. Appendix not visualized. Calcified mesenteric lymph nodes Pelvis: Uterus surgically absent. Urinary bladder unremarkable. No pelvic fluid Peritoneum/Retroperitoneum: No ascites or pneumoperitoneum. Aorta normal in caliber. No retroperitoneal adenopathy Bones/Soft Tissues: No acute bony abnormality     1. No evidence of pulmonary embolism 2. No acute cardiopulmonary process demonstrated 3. Calcific coronary atherosclerosis 4. Stable right lung nodule 5. No acute abdominopelvic process demonstrated 6. Porcelain gallbladder 7. Colonic and duodenal diverticulosis       ED COURSE/MDM  Patient seen and evaluated. Old records reviewed. Labs and imaging reviewed and results discussed with patient. The patient's ED workup was notable for symptomatology concerning for possible anginal event. She does report some neck discomfort and upper chest discomfort with onset of the clamminess and general body heaviness although had no specific chest pains otherwise. She did feel short of breath. Her CAT scan imaging does not reveal any intrathoracic findings like PE or infectious process but does show some calcified CAD. Additionally, her initial troponin is negative. Her abdomen has no abdominal pain on palpation or by history however she did have some vomiting constipation and incidental finding of porcelain gallbladder is noted as well which she already knew about. She has a stable pulmonary nodule. EKG is nonspecific but not acutely ischemic appearing. She will be admitted for further anginal evaluation. Patient was given aspirin. Feeling better in ED as opposed to earlier this morning.   Also based on report of tachycardia at home and h/o SVT, it is possible the patient had symptoms related to another SVT episode resolved prior to arrival.    During the patient's ED course, the patient was given:  Medications   aspirin chewable tablet 324 mg (has no administration in time range)   iopamidol (ISOVUE-370) 76 % injection 75 mL (75 mLs Intravenous Given 6/22/19 0952)        CLINICAL IMPRESSION  1. Angina pectoris ([address]    2. Porcelain gallbladder        Blood pressure (!) 118/54, pulse 74, temperature 98.6 °F (37 °C), temperature source Oral, resp. rate 16, height 5' 2.5\" (1.588 m), weight 183 lb (83 kg), SpO2 95 %. DISPOSITION  Kamala Carpenter was admitted in fair condition. I have discussed the findings of today's workup with the patient and addressed the patient's questions and concerns. The plan is to admit to the hospital at this time under the hospitalist service. I spoke with Dr. Darcie Joya who accepted the patient and will take over the patient's care. The patient is agreeable with this plan. DISCLAIMER: This chart was created using Dragon dictation software. Efforts were made by me to ensure accuracy, however some errors may be present due to limitations of this technology and occasionally words are not transcribed correctly.         Renzo Polanco MD  06/22/19 [id number]

## 2019-06-23 LAB
ANION GAP SERPL CALCULATED.3IONS-SCNC: 13 MMOL/L (ref 3–16)
BUN BLDV-MCNC: 30 MG/DL (ref 7–20)
CALCIUM SERPL-MCNC: 9.5 MG/DL (ref 8.3–10.6)
CHLORIDE BLD-SCNC: 99 MMOL/L (ref 99–110)
CO2: 23 MMOL/L (ref 21–32)
CREAT SERPL-MCNC: 1.2 MG/DL (ref 0.6–1.2)
EKG ATRIAL RATE: 69 BPM
EKG DIAGNOSIS: NORMAL
EKG P AXIS: 75 DEGREES
EKG P-R INTERVAL: 184 MS
EKG Q-T INTERVAL: 420 MS
EKG QRS DURATION: 86 MS
EKG QTC CALCULATION (BAZETT): 450 MS
EKG R AXIS: -19 DEGREES
EKG T AXIS: 39 DEGREES
EKG VENTRICULAR RATE: 69 BPM
GFR AFRICAN AMERICAN: 51
GFR NON-AFRICAN AMERICAN: 42
GLUCOSE BLD-MCNC: 110 MG/DL (ref 70–99)
HCT VFR BLD CALC: 34 % (ref 36–48)
HEMOGLOBIN: 11.3 G/DL (ref 12–16)
MCH RBC QN AUTO: 30.9 PG (ref 26–34)
MCHC RBC AUTO-ENTMCNC: 33.2 G/DL (ref 31–36)
MCV RBC AUTO: 93.1 FL (ref 80–100)
PDW BLD-RTO: 15 % (ref 12.4–15.4)
PLATELET # BLD: 164 K/UL (ref 135–450)
PMV BLD AUTO: 8.6 FL (ref 5–10.5)
POTASSIUM SERPL-SCNC: 4.3 MMOL/L (ref 3.5–5.1)
RBC # BLD: 3.65 M/UL (ref 4–5.2)
SODIUM BLD-SCNC: 135 MMOL/L (ref 136–145)
TSH REFLEX FT4: 0.69 UIU/ML (ref 0.27–4.2)
WBC # BLD: 8.6 K/UL (ref 4–11)

## 2019-06-23 PROCEDURE — 93010 ELECTROCARDIOGRAM REPORT: CPT | Performed by: INTERNAL MEDICINE

## 2019-06-23 PROCEDURE — 84443 ASSAY THYROID STIM HORMONE: CPT

## 2019-06-23 PROCEDURE — 85027 COMPLETE CBC AUTOMATED: CPT

## 2019-06-23 PROCEDURE — 96372 THER/PROPH/DIAG INJ SC/IM: CPT

## 2019-06-23 PROCEDURE — 36415 COLL VENOUS BLD VENIPUNCTURE: CPT

## 2019-06-23 PROCEDURE — 99223 1ST HOSP IP/OBS HIGH 75: CPT | Performed by: INTERNAL MEDICINE

## 2019-06-23 PROCEDURE — 80048 BASIC METABOLIC PNL TOTAL CA: CPT

## 2019-06-23 PROCEDURE — 6360000002 HC RX W HCPCS: Performed by: INTERNAL MEDICINE

## 2019-06-23 PROCEDURE — 2580000003 HC RX 258: Performed by: INTERNAL MEDICINE

## 2019-06-23 PROCEDURE — 1200000000 HC SEMI PRIVATE

## 2019-06-23 PROCEDURE — 6370000000 HC RX 637 (ALT 250 FOR IP): Performed by: INTERNAL MEDICINE

## 2019-06-23 PROCEDURE — 93005 ELECTROCARDIOGRAM TRACING: CPT | Performed by: INTERNAL MEDICINE

## 2019-06-23 RX ORDER — CARVEDILOL 6.25 MG/1
12.5 TABLET ORAL 2 TIMES DAILY WITH MEALS
Status: DISCONTINUED | OUTPATIENT
Start: 2019-06-23 | End: 2019-06-24

## 2019-06-23 RX ORDER — FLUTICASONE PROPIONATE 50 MCG
1 SPRAY, SUSPENSION (ML) NASAL 2 TIMES DAILY
Status: DISCONTINUED | OUTPATIENT
Start: 2019-06-23 | End: 2019-06-25 | Stop reason: HOSPADM

## 2019-06-23 RX ADMIN — SPIRONOLACTONE 25 MG: 25 TABLET ORAL at 09:48

## 2019-06-23 RX ADMIN — DOCUSATE SODIUM 100 MG: 100 CAPSULE, LIQUID FILLED ORAL at 20:01

## 2019-06-23 RX ADMIN — TROSPIUM CHLORIDE 20 MG: 20 TABLET, FILM COATED ORAL at 18:27

## 2019-06-23 RX ADMIN — LEVOTHYROXINE SODIUM 100 MCG: 50 TABLET ORAL at 06:21

## 2019-06-23 RX ADMIN — CARVEDILOL 12.5 MG: 6.25 TABLET, FILM COATED ORAL at 18:27

## 2019-06-23 RX ADMIN — DOCUSATE SODIUM 100 MG: 100 CAPSULE, LIQUID FILLED ORAL at 09:49

## 2019-06-23 RX ADMIN — ALLOPURINOL 100 MG: 100 TABLET ORAL at 09:48

## 2019-06-23 RX ADMIN — FLUTICASONE PROPIONATE 1 SPRAY: 50 SPRAY, METERED NASAL at 13:24

## 2019-06-23 RX ADMIN — ENOXAPARIN SODIUM 40 MG: 40 INJECTION SUBCUTANEOUS at 09:48

## 2019-06-23 RX ADMIN — ENOXAPARIN SODIUM 80 MG: 80 INJECTION SUBCUTANEOUS at 20:02

## 2019-06-23 RX ADMIN — CARVEDILOL 6.25 MG: 6.25 TABLET, FILM COATED ORAL at 09:49

## 2019-06-23 RX ADMIN — Medication 10 ML: at 20:04

## 2019-06-23 RX ADMIN — VALSARTAN 40 MG: 80 TABLET, FILM COATED ORAL at 09:49

## 2019-06-23 RX ADMIN — FLUTICASONE PROPIONATE 1 SPRAY: 50 SPRAY, METERED NASAL at 20:01

## 2019-06-23 RX ADMIN — Medication 10 ML: at 09:48

## 2019-06-23 RX ADMIN — VITAMIN D, TAB 1000IU (100/BT) 2000 UNITS: 25 TAB at 13:24

## 2019-06-23 RX ADMIN — ATORVASTATIN CALCIUM 20 MG: 10 TABLET, FILM COATED ORAL at 20:01

## 2019-06-23 RX ADMIN — TROSPIUM CHLORIDE 20 MG: 20 TABLET, FILM COATED ORAL at 09:48

## 2019-06-23 NOTE — CONSULTS
[facility]  [phone]        Reason for Consultation/Chief Complaint: \"I have been having rapid heart beat . \"    History of Present Illness:  Ana M Flores is a 80 y.o. patient who presented to the hospital with complaints of sudden weakness. She checked her heart rate and was 185. She did breathing exercises and it took 4 hrs to improve. She was brought to ED by squad. Last 24 hrs she has had episodes of asymptomatic afib RVR on tele. She did not have chest pain no dizziness or syncope. She is ruled out for MI  She has prior history of SVT and MI 2007   She is s/p stent to LAD  In 2007 by Serenity Puckett and has been followed by Dr Chela Barahona. No recent stress testing. Last echo 2018 had apical scar. I have been asked to provide consultation regarding further management and testing. Past Medical History:   has a past medical history of Arthritis, CAD (coronary artery disease), CHF (congestive heart failure) ([address] Depression, Diphtheria, Gout, Hyperlipemia, Hypertension, STEMI (ST elevation myocardial infarction) ([address] Thyroid disease, and Wears hearing aid. Surgical History:   has a past surgical history that includes Hysterectomy; Appendectomy; Tonsillectomy; Colonoscopy (4/29/2013); Breast surgery; Cardiac surgery (2007); joint replacement (Left, 8/13/14); other surgical history (Right, 1/6/14); and Cataract removal with implant (Right, 10/11/2017). Social History:   reports that she quit smoking about 18 years ago. Her smoking use included cigarettes. She has a 20.00 pack-year smoking history. She has never used smokeless tobacco. She reports that she does not drink alcohol or use drugs. Family History:  family history includes Cancer in her brother. Home Medications:  Were reviewed and are listed in nursing record. and/or listed below  Prior to Admission medications    Medication Sig Start Date End Date Taking?  Authorizing Provider   diclofenac (VOLTAREN) 25 MG EC tablet Take 1 tablet by mouth 2 times daily 3/6/19  Yes James Strong DO   valsartan (DIOVAN) 40 MG tablet Take 1 tablet by mouth daily 8/10/18  Yes Mailka More MD   lidocaine (LIDODERM) 5 % Place 2 patches onto the skin daily 12 hours on, 12 hours off. 8/9/18  Yes Malika More MD   spironolactone (ALDACTONE) 25 MG tablet Take 1 tablet by mouth daily 8/10/18  Yes Malika More MD   Acetaminophen 325 MG CAPS Take 650 mg by mouth 3 times daily   Yes Historical Provider, MD   hydrocortisone (ANUSOL-HC) 2.5 % rectal cream Place rectally 2 times daily Place rectally 2 times daily. Yes Historical Provider, MD   psyllium (KONSYL) 28.3 % PACK Take 1 packet by mouth daily   Yes Historical Provider, MD   NONFORMULARY    Yes Historical Provider, MD   atorvastatin (LIPITOR) 20 MG tablet Take 20 mg by mouth daily   Yes Historical Provider, MD   methenamine (MANDELAMINE) 1 g tablet Take 1 g by mouth 2 times daily   Yes Historical Provider, MD   Cholecalciferol (VITAMIN D) 2000 units CAPS capsule Take by mouth Daily   Yes Historical Provider, MD   carvedilol (COREG) 6.25 MG tablet Take 6.25 mg by mouth 2 times daily (with meals)   Yes Historical Provider, MD   estradiol (ESTRACE) 0.1 MG/GM vaginal cream Place 1 g vaginally Twice a Week    Yes Historical Provider, MD   Cyanocobalamin (VITAMIN B 12 PO) Take 1,000 Units by mouth daily    Yes Historical Provider, MD   allopurinol (ZYLOPRIM) 100 MG tablet Take 100 mg by mouth daily   Yes Historical Provider, MD   docusate sodium (COLACE) 100 MG capsule Take 100 mg by mouth 2 times daily   Yes Historical Provider, MD   nitroGLYCERIN (NITROSTAT) 0.4 MG SL tablet Place 0.4 mg under the tongue every 5 minutes as needed (never  used). Yes Historical Provider, MD   meclizine (ANTIVERT) 25 MG CHEW Take 25 mg by mouth 3 times daily as needed (not used recently). Yes Historical Provider, MD   levothyroxine (SYNTHROID) 100 MCG tablet Take 100 mcg by mouth daily.      Yes HCT 34.0 06/23/2019    MCV 93.1 06/23/2019    RDW 15.0 06/23/2019     06/23/2019     CMP:    Lab Results   Component Value Date     06/23/2019    K 4.3 06/23/2019    K 4.4 10/24/2018    CL 99 06/23/2019    CO2 23 06/23/2019    BUN 30 06/23/2019    CREATININE 1.2 06/23/2019    GFRAA 51 06/23/2019    GFRAA >60 09/30/2012    AGRATIO 1.0 06/22/2019    LABGLOM 42 06/23/2019    GLUCOSE 110 06/23/2019    PROT 8.2 06/22/2019    PROT 7.7 09/30/2012    CALCIUM 9.5 06/23/2019    BILITOT 0.4 06/22/2019    ALKPHOS 68 06/22/2019    AST 17 06/22/2019    ALT 14 06/22/2019     PT/INR:  No results found for: PTINR  Lab Results   Component Value Date    TROPONINI <0.01 06/22/2019       EKG:  I have reviewed EKG with the following interpretation:  Impression:  6.23.19    Final 06/23/2019  6:56 AM 14    Normal sinus rhythmLow voltage QRSSeptal infarct (cited on or before 30-DEC-2014)Inferior infarct , age undeterminedNonspecific ST abnormalityAbnormal ECGWhen compared with ECG of 22-JUN-2019 08:59,No significant change was foundConfirmed by SARAH Brown MD ([id number]) on 6/23/2019 7:45:50 AM     Chest xray 6.22.19       There is incomplete inspiration resulting in vascular crowding and slight   haziness of the lungs.  Heart size borderline.  Crowding of the pulmonary   vessels due to incomplete inspiration.  Tortuous thoracic aorta              Summary echo 8.6.18   Low normal systolic function with an estimated ejection fraction of 50%.  Hypokinetc apex, apical inferior and apical septal walls.   Normal left ventricular diastolic filling pressure.   Systolic pulmonary artery pressure (SPAP) is normal and estimated at 26 mmHg   (right atrial pressure 3 mmHg).      Assessment  Paroxysmal atrial fib RVR  CAD without angina  S/p LAD stenting in 2007  Ischemic cardiomyopathy  Patient Active Problem List   Diagnosis    Anginal pain ([address]    CAD (coronary artery disease)    Dyspnea on exertion    Primary localized

## 2019-06-23 NOTE — CARE COORDINATION
CASE MANAGEMENT INITIAL ASSESSMENT      Reviewed chart and met with patient today, re: 80year old female. Explained Case Management role/services. Family present: none  Primary contact information: Jennifer s [phone]    Admit date/status: 6/22/19  Diagnosis: acute coronary syndrome    Insurance: [id number] [place] required for SNF - yes        3 night stay required - no    Living arrangements, Adls, care needs, prior to admission: Patient lives in a 2nd floor apartment at [facility]. Has an elevator for entrance. Transportation: private    [facility] [place] at home: Walker_X_Cane__RTS__ BSC__Shower Chair_X_  02__ HHN__ CPAP__  BiPap__  Hospital Bed__ W/C___ Other__Grab bars, pull cords________    Services in the home and/or outpatient, prior to admission: Patient states she is receiving home PT from someone but can not remember the name of the agency. States it was set up from the apartment. PT/OT recs: none    Hospital Exemption Notification (HEN): not initiated    Barriers to discharge: none    Plan/comments:   Met with patient at bedside. Patient stated she is active with a [address] but can not remember which agency. States it was arranged by her apartment complex. Writer reached out to [employer] and left a message. Awaiting returned call.       ECOC on chart for MD signature     Keeley Hernandez, RN

## 2019-06-23 NOTE — PROGRESS NOTES
Pt has been in NSR now with he rate of 76, there is occasional times when pt's HR goes into the 140s and sustains for a few moments then goes back down. Will continue to monitor.

## 2019-06-23 NOTE — PROGRESS NOTES
Hospitalist Progress Note  6/23/2019 2:01 PM  Subjective:   Admit Date: 6/22/2019  PCP: Beatrice Hicks MD  Status: Inpatient   Interval History: Hospital Day: 2, admitted with episodic SVT and chest pain. Ruled out for MI by negative troponin T enzymes. Episodes of HR into the 140s for a few minutes with spontaneous resolution. History of present illness:  Admitted after episode of presumed SVT and chest pain. History of CAD (stent 2007) who awakened around 0200 today with dyspnea, clammy, lightheadedness, and nausea and emesis overnight. Recent history of constipation with no bowel movement in two days. She says she is having trouble explaining what happened to her at 0200. She felt \"heavy all over\" with some neck and upper chest discomfort.  It reminded her of her previous heart attack requiring a stent 10 years ago. Jay Killian also says her HR was 180's at the time when she checked at home. [facility] history of SVT and she tried valsalva and deep breathing which appears to have resulted in return to sinus rhythm. Quit smoking in 2000 after about a 20 pk/yr history.   She uses a fit bit for heart rate and knows when it goes above 150 bpm.      DIET GENERAL; No Caffeine  Diet NPO, After Midnight (may have general diet)  Medications:     allopurinol  100 mg Oral Daily   atorvastatin  20 mg Oral Daily   carvedilol  6.25 mg Oral BID WC   vitamin D  2,000 Units Oral Daily   docusate sodium  100 mg Oral BID   levothyroxine  100 mcg Oral Daily   lidocaine  4% patch Transdermal Daily   psyllium  1 packet Oral Daily   spironolactone  25 mg Oral Daily   trospium  20 mg Oral BID AC   valsartan  40 mg Oral Daily   aspirin  81 mg Oral Daily   enoxaparin  40 mg Subcutaneous Daily     Recent Labs     06/22/19  0838 06/23/19  0638   WBC 12.2* 8.6   HGB 12.1 11.3*    164   MCV 92.9 93.1     Recent Labs     06/22/19  0838 06/23/19  0638   * 135*   K 4.6 4.3   CL 97* 99   CO2 22 23   BUN 30* 30*   CREATININE 1.2 1.2 GLUCOSE 137* 110*     Troponin T:  Negative x 3  proBNP 2,017 pg/mL  Lipase: 26 U/L    CT chest, abd/pelvis with IV contrast:  Pulmonary arteries are well opacified. No emboli are demonstrated. Thoracic aorta normal in caliber. No pericardial effusion. No mediastinal adenopathy. Coronary artery calcification Lungs/pleura: Some distortion of the lung bases due to patient breathing motion. No airspace disease. Right pulmonary nodule. No pleural effusion. No acute bony abnormality   Abdomen/Pelvis: Solid organs unremarkable. Calcification of the wall of the fundus of the gallbladder with contraction of the body. Bile ducts normal in caliber. Duodenal diverticula. Colonic diverticula with no diverticular inflammatory stranding. Appendix not visualized. Calcified mesenteric lymph nodes. Uterus surgically absent. Urinary bladder unremarkable. No pelvic fluid. No ascites or pneumoperitoneum. Aorta normal in caliber. No retroperitoneal adenopathy   No evidence of pulmonary embolism. No acute cardiopulmonary process demonstrated Calcific coronary atherosclerosis. Stable right lung nodule  No acute abdominopelvic process demonstrated. Porcelain gallbladder. Colonic and duodenal diverticulosis      Portable CXR: There is incomplete inspiration resulting in vascular crowding and slight haziness of the lungs. Heart size borderline. Crowding of the pulmonary vessels due to incomplete inspiration. Tortuous thoracic aorta. Poor inspiratory effort. Would be difficult to rule out mild edema      EKG: sinus rhythm (HR 77) with sinus arrhythmia. Intervals: normal MT, narrow QRS, normal QTc. ST segments: no ST elevations or depressions. Non-specific T wave changes unchanged from prior EKG 3/6/19.      Objective:   Vitals:  /78   Pulse 79   Temp 97.9 °F (36.6 °C)   Resp 20   Ht 5' 2.5\"  Wt 82.1 kg  SpO2 93%   BMI 32.56 kg/m²   General appearance: alert and cooperative with exam  Lungs: clear to auscultation bilaterally  Heart: regular rate and rhythm, S1, S2 normal, no murmur, click, rub or gallop  Abdomen: soft, non-tender; bowel sounds normal; no masses,  no organomegaly  Extremities: extremities normal, atraumatic, no cyanosis or edema  Neurologic: No obvious focal neurologic deficits. Assessment and Plan:   1. Chest pain with known CAD s/p stent:  Ruled out for MI with three serial troponin T enzymes. Cardiology evaluation. Continue aspirin 81 mg, carvedilol 6.125 mg BID, and atorvastatin. Clear liquids until seen by cardiology. 2. Episodic SVT and paroxysmal atrial atrial fibrillation:  [id number] Score 4 (HTN, age, sex). Two further episodes of SVT on the unit,  with spontaneous resolution. Started on enoxaparin by cardiology. 3. Hypertension:  Continues on spironolactone 25 mg and valsartan 40 mg daily. 4. Dyslipidemia:  Continue atorvastatin 20 mg nightly. 5. History of gout:  Continue allopurinol 100 mg daily. Check uric acid only if flare. 6. Hypovolemic hyponatremia: Improving with volume expansion with normal saline. Recheck in the AM.   7. Hypothyroid (TSH 1.3):  Continue levothyroxine 100 mcg daily. 8. Chronic constipation:  Bowel movement since hospitalized. Continues on docusate and psyllium. [address] [place] [address] is not on formulary. She also takes metamucil. 9. History of bilateral knee replacement. Advance Directive: Full code, confirmed by patient. DVT prophylaxis with enoxaparin 40 mg sub-Q daily. Discharge planning:  Echocardiogram and Stress Myoview in the morning, Monday, June 24. Possible discharge after results available. She is followed by [facility] cardiology (Dr. Dariel Rothman).         Keith Jenkins MD  [facility]ist

## 2019-06-24 ENCOUNTER — APPOINTMENT (OUTPATIENT)
Dept: NUCLEAR MEDICINE | Age: 84
DRG: 309 | End: 2019-06-24
Payer: MEDICARE

## 2019-06-24 LAB
ANION GAP SERPL CALCULATED.3IONS-SCNC: 12 MMOL/L (ref 3–16)
BASOPHILS ABSOLUTE: 0 K/UL (ref 0–0.2)
BASOPHILS RELATIVE PERCENT: 0.2 %
BUN BLDV-MCNC: 27 MG/DL (ref 7–20)
CALCIUM SERPL-MCNC: 9.5 MG/DL (ref 8.3–10.6)
CHLORIDE BLD-SCNC: 101 MMOL/L (ref 99–110)
CO2: 24 MMOL/L (ref 21–32)
CREAT SERPL-MCNC: 1.3 MG/DL (ref 0.6–1.2)
EOSINOPHILS ABSOLUTE: 0.1 K/UL (ref 0–0.6)
EOSINOPHILS RELATIVE PERCENT: 0.7 %
GFR AFRICAN AMERICAN: 47
GFR NON-AFRICAN AMERICAN: 39
GLUCOSE BLD-MCNC: 116 MG/DL (ref 70–99)
HCT VFR BLD CALC: 33.7 % (ref 36–48)
HEMOGLOBIN: 10.9 G/DL (ref 12–16)
LV EF: 45 %
LV EF: 52 %
LVEF MODALITY: NORMAL
LVEF MODALITY: NORMAL
LYMPHOCYTES ABSOLUTE: 2.3 K/UL (ref 1–5.1)
LYMPHOCYTES RELATIVE PERCENT: 20.2 %
MCH RBC QN AUTO: 30.4 PG (ref 26–34)
MCHC RBC AUTO-ENTMCNC: 32.5 G/DL (ref 31–36)
MCV RBC AUTO: 93.5 FL (ref 80–100)
MONOCYTES ABSOLUTE: 1.1 K/UL (ref 0–1.3)
MONOCYTES RELATIVE PERCENT: 10.1 %
NEUTROPHILS ABSOLUTE: 7.7 K/UL (ref 1.7–7.7)
NEUTROPHILS RELATIVE PERCENT: 68.8 %
PDW BLD-RTO: 15 % (ref 12.4–15.4)
PLATELET # BLD: 166 K/UL (ref 135–450)
PMV BLD AUTO: 8.8 FL (ref 5–10.5)
POTASSIUM REFLEX MAGNESIUM: 4.4 MMOL/L (ref 3.5–5.1)
RBC # BLD: 3.6 M/UL (ref 4–5.2)
SODIUM BLD-SCNC: 137 MMOL/L (ref 136–145)
WBC # BLD: 11.2 K/UL (ref 4–11)

## 2019-06-24 PROCEDURE — 2580000003 HC RX 258: Performed by: INTERNAL MEDICINE

## 2019-06-24 PROCEDURE — 93306 TTE W/DOPPLER COMPLETE: CPT

## 2019-06-24 PROCEDURE — 1200000000 HC SEMI PRIVATE

## 2019-06-24 PROCEDURE — 6360000002 HC RX W HCPCS: Performed by: INTERNAL MEDICINE

## 2019-06-24 PROCEDURE — 36415 COLL VENOUS BLD VENIPUNCTURE: CPT

## 2019-06-24 PROCEDURE — A9502 TC99M TETROFOSMIN: HCPCS | Performed by: INTERNAL MEDICINE

## 2019-06-24 PROCEDURE — 94150 VITAL CAPACITY TEST: CPT

## 2019-06-24 PROCEDURE — 6370000000 HC RX 637 (ALT 250 FOR IP): Performed by: NURSE PRACTITIONER

## 2019-06-24 PROCEDURE — 6370000000 HC RX 637 (ALT 250 FOR IP): Performed by: INTERNAL MEDICINE

## 2019-06-24 PROCEDURE — 80048 BASIC METABOLIC PNL TOTAL CA: CPT

## 2019-06-24 PROCEDURE — 96372 THER/PROPH/DIAG INJ SC/IM: CPT

## 2019-06-24 PROCEDURE — 78452 HT MUSCLE IMAGE SPECT MULT: CPT

## 2019-06-24 PROCEDURE — 96374 THER/PROPH/DIAG INJ IV PUSH: CPT

## 2019-06-24 PROCEDURE — 93017 CV STRESS TEST TRACING ONLY: CPT

## 2019-06-24 PROCEDURE — 85025 COMPLETE CBC W/AUTO DIFF WBC: CPT

## 2019-06-24 PROCEDURE — 3430000000 HC RX DIAGNOSTIC RADIOPHARMACEUTICAL: Performed by: INTERNAL MEDICINE

## 2019-06-24 RX ORDER — METOPROLOL SUCCINATE 50 MG/1
50 TABLET, EXTENDED RELEASE ORAL 2 TIMES DAILY
Status: DISCONTINUED | OUTPATIENT
Start: 2019-06-24 | End: 2019-06-25 | Stop reason: HOSPADM

## 2019-06-24 RX ORDER — ALBUTEROL SULFATE 90 UG/1
2 AEROSOL, METERED RESPIRATORY (INHALATION) EVERY 6 HOURS PRN
Status: DISCONTINUED | OUTPATIENT
Start: 2019-06-24 | End: 2019-06-25 | Stop reason: HOSPADM

## 2019-06-24 RX ADMIN — ATORVASTATIN CALCIUM 20 MG: 10 TABLET, FILM COATED ORAL at 21:44

## 2019-06-24 RX ADMIN — VITAMIN D, TAB 1000IU (100/BT) 2000 UNITS: 25 TAB at 08:40

## 2019-06-24 RX ADMIN — LEVOTHYROXINE SODIUM 100 MCG: 50 TABLET ORAL at 08:49

## 2019-06-24 RX ADMIN — TROSPIUM CHLORIDE 20 MG: 20 TABLET, FILM COATED ORAL at 16:30

## 2019-06-24 RX ADMIN — Medication 10 ML: at 08:40

## 2019-06-24 RX ADMIN — TROSPIUM CHLORIDE 20 MG: 20 TABLET, FILM COATED ORAL at 08:39

## 2019-06-24 RX ADMIN — CARVEDILOL 12.5 MG: 6.25 TABLET, FILM COATED ORAL at 16:30

## 2019-06-24 RX ADMIN — VALSARTAN 40 MG: 80 TABLET, FILM COATED ORAL at 08:39

## 2019-06-24 RX ADMIN — REGADENOSON 0.4 MG: 0.08 INJECTION, SOLUTION INTRAVENOUS at 13:19

## 2019-06-24 RX ADMIN — SPIRONOLACTONE 25 MG: 25 TABLET ORAL at 08:39

## 2019-06-24 RX ADMIN — ONDANSETRON 4 MG: 2 INJECTION INTRAMUSCULAR; INTRAVENOUS at 08:40

## 2019-06-24 RX ADMIN — TETROFOSMIN 10.6 MILLICURIE: 1.38 INJECTION, POWDER, LYOPHILIZED, FOR SOLUTION INTRAVENOUS at 11:59

## 2019-06-24 RX ADMIN — ASPIRIN 81 MG 81 MG: 81 TABLET ORAL at 08:39

## 2019-06-24 RX ADMIN — Medication 10 ML: at 21:44

## 2019-06-24 RX ADMIN — FLUTICASONE PROPIONATE 1 SPRAY: 50 SPRAY, METERED NASAL at 08:43

## 2019-06-24 RX ADMIN — DOCUSATE SODIUM 100 MG: 100 CAPSULE, LIQUID FILLED ORAL at 21:44

## 2019-06-24 RX ADMIN — ALLOPURINOL 100 MG: 100 TABLET ORAL at 08:39

## 2019-06-24 RX ADMIN — FLUTICASONE PROPIONATE 1 SPRAY: 50 SPRAY, METERED NASAL at 15:51

## 2019-06-24 RX ADMIN — ENOXAPARIN SODIUM 80 MG: 80 INJECTION SUBCUTANEOUS at 21:44

## 2019-06-24 RX ADMIN — CARVEDILOL 12.5 MG: 6.25 TABLET, FILM COATED ORAL at 08:39

## 2019-06-24 RX ADMIN — DOCUSATE SODIUM 100 MG: 100 CAPSULE, LIQUID FILLED ORAL at 08:40

## 2019-06-24 ASSESSMENT — PAIN DESCRIPTION - PAIN TYPE: TYPE: ACUTE PAIN

## 2019-06-24 ASSESSMENT — PAIN DESCRIPTION - DESCRIPTORS: DESCRIPTORS: ACHING

## 2019-06-24 ASSESSMENT — PAIN SCALES - GENERAL
PAINLEVEL_OUTOF10: 0
PAINLEVEL_OUTOF10: 7
PAINLEVEL_OUTOF10: 0

## 2019-06-24 ASSESSMENT — PAIN DESCRIPTION - FREQUENCY: FREQUENCY: CONTINUOUS

## 2019-06-24 ASSESSMENT — PAIN DESCRIPTION - ORIENTATION: ORIENTATION: MID

## 2019-06-24 ASSESSMENT — PAIN DESCRIPTION - LOCATION: LOCATION: THROAT

## 2019-06-24 NOTE — CARE COORDINATION
CM called [facility] per patient's request to notify of her being hospitalized. Patient wanted to make sure her therapist, Barney Urias, was notified. CM spoke with Moises at [facility], who states she will notify Barney Urias. CM team will notify [address] when pt is discharged.      Christella Cheadle, RN DCP

## 2019-06-24 NOTE — PROGRESS NOTES
RESPIRATORY THERAPY ASSESSMENT    Name:  [address] [facility] Record Number:  [id number]  Age: 80 y.o. Gender: female  : 1930  Today's Date:  2019  Room:  [address] [place] [address]-    Assessment     Is the patient being admitted for a COPD or Asthma exacerbation? No   (If yes the patient will be seen every 4 hours for the first 24 hours and then reassessed)    Patient Admission Diagnosis      Allergies  Allergies   Allergen Reactions    Ace Inhibitors Other (See Comments)     cough    Amoxicillin Hives     Hives    Bactrim [Sulfamethoxazole-Trimethoprim]     Ciprofloxacin     Clindamycin/Lincomycin     Naproxen Nausea Only     Nausea and stomach pain    Statins Other (See Comments)     myalgia  unknown    Hydrochlorothiazide Rash    Sulfa Antibiotics Rash     rash  Blistering rash       Minimum Predicted Vital Capacity:     768          Actual Vital Capacity:      250              Pulmonary History:CHF/Pulmonary Edema  Home Oxygen Therapy:  room air  Home Respiratory Therapy:None   Current Respiratory Therapy:  Albuterol mdi PRN          Respiratory Severity Index(RSI)   Patients with orders for inhalation medications, oxygen, or any therapeutic treatment modality will be placed on Respiratory Protocol. They will be assessed with the first treatment and at least every 72 hours thereafter. The following severity scale will be used to determine frequency of treatment intervention.     Smoking History: Pulmonary Disease or Smoking History, Greater than 15 pack year = 2    Social History  Social History     Tobacco Use    Smoking status: Former Smoker     Packs/day: 0.50     Years: 40.00     Pack years: 20.00     Types: Cigarettes     Last attempt to quit: 2000     Years since quittin.9    Smokeless tobacco: Never Used    Tobacco comment: less than pack day   Substance Use Topics    Alcohol use: No    Drug use: No       Recent Surgical History: None = 0  Past Surgical History  Past Surgical History:   Procedure Laterality Date    APPENDECTOMY      BREAST SURGERY      biopsy benign    CARDIAC SURGERY  2007    stent    CATARACT REMOVAL WITH IMPLANT Right 10/11/2017    COLONOSCOPY  4/29/2013    HYSTERECTOMY      JOINT REPLACEMENT Left 8/13/14    LEFT TOTAL KNEE REPLACEMENT WITH FEMORAL NERVE BLOCK FOR PAIN    OTHER SURGICAL HISTORY Right 1/6/14    right total knee replacement    TONSILLECTOMY         Level of Consciousness: Alert, Oriented, and Cooperative = 0    Level of Activity: Walking with assistance = 1    Respiratory Pattern: Regular Pattern; RR 8-20 = 0    Breath Sounds: Diminshed bilaterally and/or crackles = 2    Sputum   ,  ,    Cough: Strong, spontaneous, non-productive = 0    Vital Signs   /89   Pulse 78   Temp 98.3 °F (36.8 °C) (Oral)   Resp 16   Ht 5' 2.5\" (1.588 m)   Wt 180 lb 14.4 oz (82.1 kg)   SpO2 92%   BMI 32.56 kg/m²   SPO2 (COPD values may differ): Greater than or equal to 92% on room air = 0    Peak Flow (asthma only): not applicable = 0    RSI: 5-6 = Q4hr PRN (every four hours as needed) for dyspnea        Plan       Goals: medication delivery, mobilize retained secretions, volume expansion and improve oxygenation    Patient/caregiver was educated on the proper method of use for Respiratory Care Devices:  Yes      Level of patient/caregiver understanding able to:   ? Verbalize understanding   ? Demonstrate understanding       ? Teach back        ? Needs reinforcement       ? No available caregiver               ? Other:     Response to education:  Good     Is patient being placed on Home Treatment Regimen? Yes     Does the patient have everything they need prior to discharge? NA     Comments: Evaluated pt and reviewed chart. Plan of Care: Albuterol mdi PRN    Electronically signed by Angelika Cohn RCP on 6/24/2019 at 2:38 AM    Respiratory Protocol Guidelines     1.  Assessment and treatment by Respiratory Therapy will be initiated for medication and therapeutic interventions upon initiation of aerosolized medication. 2. Physician will be contacted for respiratory rate (RR) greater than 35 breaths per minute. Therapy will be held for heart rate (HR) greater than 140 beats per minute, pending direction from physician. 3. Bronchodilators will be administered via Metered Dose Inhaler (MDI) with spacer when the following criteria are met:  a. Alert and cooperative     b. HR < 140 bpm  c. RR < 30 bpm                d. Can demonstrate a 2-3 second inspiratory hold  4. Bronchodilators will be administered via Hand Held Nebulizer K[facility]) to patients when ANY of the following criteria are met  a. Incognizant or uncooperative          b. Patients treated with HHN at Home        c. Unable to demonstrate proper use of MDI with spacer     d. RR > 30 bpm   5. Bronchodilators will be delivered via Metered Dose Inhaler (MDI), HHN, Aerogen to intubated patients on mechanical ventilation. 6. Inhalation medication orders will be delivered and/or substituted as outlined below. Aerosolized Medications Ordering and Administration Guidelines:    1. All Medications will be ordered by a physician, and their frequency and/or modality will be adjusted as defined by the patients Respiratory Severity Index (RSI) score. 2. If the patient does not have documented COPD, consider discontinuing anticholinergics when RSI is less than 9.  3. If the bronchospasm worsens (increased RSI), then the bronchodilator frequency can be increased to a maximum of every 4 hours. If greater than every 4 hours is required, the physician will be contacted. 4. If the bronchospasm improves, the frequency of the bronchodilator can be decreased, based on the patient's RSI, but not less than home treatment regimen frequency. 5. Bronchodilator(s) will be discontinued if patient has a RSI less than 9 and has received no scheduled or as needed treatment for 72  Hrs.     Patients Ordered on a

## 2019-06-24 NOTE — PROGRESS NOTES
CANDIE Godinez paged: Pt admitted with nausea, shortness of breath and poss SVT. Pt also thinks have a cold pt has started to get congested over the last day and she has received Flonase which helped but now she is experiencing more shortness of breath at rest and she is having some wheezing. Pt's O2 is 96%. Could we try ordering a breathing tx? Thanks    Breathing treatments ordered.

## 2019-06-24 NOTE — PROGRESS NOTES
A em stress test was completed on this patient as ordered. The patient tolerated the procedure well. Awaiting stress imaging at this time.

## 2019-06-24 NOTE — CARE COORDINATION
CASE MANAGEMENT INITIAL ASSESSMENT      Reviewed chart and met with patient today, re: potential needs at discharge. Triggered by age and dx. Explained Case Management role/services. Family present: yes, daughter Carlos Cummings     Primary contact information: nivia Cummings [phone]    Admit date/status: inpatient 6/22/19  Diagnosis: acute coronary syndrome    Insurance: [facility] Medicare    Precert required for SNF - Y        3 night stay required - N    Living arrangements, Adls, care needs, prior to admission: lives alone in 2nd floor apt. Elevator to second floor. Does not drives. indep in adls. Transportation: to be determined. Family can pick pt up if needed. No preference for ambulance agency if needs transport. Durable Medical Equipment at home: rollator with seat. Cane    Services in the home and/or outpatient, prior to admission: PT Monday and wednesdays thru [facility] home care    PT/OT recs: not ordered at this time - CM placed sticky note to provider to please order when appropriate    Hospital Exemption Notification (HEN): not started    Barriers to discharge: none identified    Plan/comments: pt plans to discharge home. Does not want SNF, but would consider if recommended by therapy/provider. Of note: previous stays at [facility] and [facility].      ECOC on chart for MD brad Doan, HERVE DCP

## 2019-06-25 VITALS
RESPIRATION RATE: 16 BRPM | HEART RATE: 78 BPM | TEMPERATURE: 98.1 F | BODY MASS INDEX: 32.09 KG/M2 | WEIGHT: 181.1 LBS | HEIGHT: 63 IN | OXYGEN SATURATION: 94 % | DIASTOLIC BLOOD PRESSURE: 89 MMHG | SYSTOLIC BLOOD PRESSURE: 139 MMHG

## 2019-06-25 PROBLEM — I48.0 PAROXYSMAL ATRIAL FIBRILLATION (HCC): Status: ACTIVE | Noted: 2019-06-25

## 2019-06-25 PROBLEM — I47.10 SVT (SUPRAVENTRICULAR TACHYCARDIA): Status: ACTIVE | Noted: 2019-06-25

## 2019-06-25 PROBLEM — I47.1 SVT (SUPRAVENTRICULAR TACHYCARDIA) (HCC): Status: ACTIVE | Noted: 2019-06-25

## 2019-06-25 PROBLEM — I25.5 ISCHEMIC CARDIOMYOPATHY: Status: ACTIVE | Noted: 2019-06-25

## 2019-06-25 PROCEDURE — 99233 SBSQ HOSP IP/OBS HIGH 50: CPT | Performed by: NURSE PRACTITIONER

## 2019-06-25 PROCEDURE — 6360000002 HC RX W HCPCS: Performed by: INTERNAL MEDICINE

## 2019-06-25 PROCEDURE — 96372 THER/PROPH/DIAG INJ SC/IM: CPT

## 2019-06-25 PROCEDURE — 6370000000 HC RX 637 (ALT 250 FOR IP): Performed by: INTERNAL MEDICINE

## 2019-06-25 PROCEDURE — 2580000003 HC RX 258: Performed by: INTERNAL MEDICINE

## 2019-06-25 RX ORDER — METOPROLOL SUCCINATE 50 MG/1
50 TABLET, EXTENDED RELEASE ORAL 2 TIMES DAILY
Qty: 90 TABLET | Refills: 0 | Status: SHIPPED | OUTPATIENT
Start: 2019-06-25

## 2019-06-25 RX ORDER — ASPIRIN 81 MG/1
81 TABLET, CHEWABLE ORAL DAILY
Qty: 30 TABLET | Refills: 3 | Status: SHIPPED | OUTPATIENT
Start: 2019-06-26

## 2019-06-25 RX ADMIN — DOCUSATE SODIUM 100 MG: 100 CAPSULE, LIQUID FILLED ORAL at 09:01

## 2019-06-25 RX ADMIN — FLUTICASONE PROPIONATE 1 SPRAY: 50 SPRAY, METERED NASAL at 17:31

## 2019-06-25 RX ADMIN — ASPIRIN 81 MG 81 MG: 81 TABLET ORAL at 09:02

## 2019-06-25 RX ADMIN — VITAMIN D, TAB 1000IU (100/BT) 2000 UNITS: 25 TAB at 09:02

## 2019-06-25 RX ADMIN — METOPROLOL SUCCINATE 50 MG: 50 TABLET, EXTENDED RELEASE ORAL at 17:31

## 2019-06-25 RX ADMIN — TROSPIUM CHLORIDE 20 MG: 20 TABLET, FILM COATED ORAL at 09:00

## 2019-06-25 RX ADMIN — SPIRONOLACTONE 25 MG: 25 TABLET ORAL at 09:02

## 2019-06-25 RX ADMIN — ACETAMINOPHEN 650 MG: 325 TABLET ORAL at 05:21

## 2019-06-25 RX ADMIN — LEVOTHYROXINE SODIUM 100 MCG: 50 TABLET ORAL at 05:21

## 2019-06-25 RX ADMIN — ENOXAPARIN SODIUM 80 MG: 80 INJECTION SUBCUTANEOUS at 09:02

## 2019-06-25 RX ADMIN — Medication 10 ML: at 09:02

## 2019-06-25 RX ADMIN — TROSPIUM CHLORIDE 20 MG: 20 TABLET, FILM COATED ORAL at 17:31

## 2019-06-25 RX ADMIN — VALSARTAN 40 MG: 80 TABLET, FILM COATED ORAL at 09:01

## 2019-06-25 RX ADMIN — ACETAMINOPHEN 650 MG: 325 TABLET ORAL at 13:13

## 2019-06-25 RX ADMIN — ALLOPURINOL 100 MG: 100 TABLET ORAL at 09:02

## 2019-06-25 RX ADMIN — FLUTICASONE PROPIONATE 1 SPRAY: 50 SPRAY, METERED NASAL at 09:02

## 2019-06-25 RX ADMIN — METOPROLOL SUCCINATE 50 MG: 50 TABLET, EXTENDED RELEASE ORAL at 09:01

## 2019-06-25 ASSESSMENT — PAIN DESCRIPTION - DESCRIPTORS: DESCRIPTORS: SHOOTING;SHARP

## 2019-06-25 ASSESSMENT — PAIN SCALES - GENERAL
PAINLEVEL_OUTOF10: 0
PAINLEVEL_OUTOF10: 9
PAINLEVEL_OUTOF10: 7

## 2019-06-25 ASSESSMENT — PAIN DESCRIPTION - ORIENTATION: ORIENTATION: RIGHT

## 2019-06-25 ASSESSMENT — PAIN DESCRIPTION - LOCATION: LOCATION: HEAD

## 2019-06-25 ASSESSMENT — PAIN DESCRIPTION - PAIN TYPE: TYPE: ACUTE PAIN

## 2019-06-25 NOTE — DISCHARGE SUMMARY
Hospital Medicine Discharge Summary    Patient ID: [id number] [facility]      Patient's PCP: Geoff Vazquez MD    Admit Date: 6/22/2019     Discharge Date: 6/25/2019      Admitting Physician: Douglas Strong MD     Discharge Physician: Varghese Ybarra MD     Discharge Diagnoses: Active Hospital Problems    Diagnosis    Paroxysmal atrial fibrillation (HCC) [I48.0]    SVT (supraventricular tachycardia) (HCC) [I47.1]    Ischemic cardiomyopathy [I25.5]    Acute coronary syndrome ([address] [I24.9]       The patient was seen and examined on day of discharge and this discharge summary is in conjunction with any daily progress note from day of discharge. Hospital Course:   HISTORY OF PRESENT ILLNESS:   The patient is a 80 y.o. female with a history of CAD (stent 2007) who awakened around 0200 today with dyspnea, clammy, lightheadedness, and nausea and emesis overnight. Recent history of constipation with no bowel movement in two days. She says she is having trouble explaining what happened to her at 0200. She felt \"heavy all over\" with some neck and upper chest discomfort.  It reminded her of her previous heart attack requiring a stent 10 years ago. [facility] also says her HR was 180's at the time when she checked at home. [facility] history of SVT and she tried valsalva and deep breathing which appears to have resulted in return to sinus rhythm. Quit smoking in 2000 after about a 20 pk/yr history. Chest pain with known CAD s/p stent:  Ruled out for MI with three serial troponin T enzymes, Sees Dr. Ana Hsu).   Cardiology evaluation.  Continue aspirin 81 mg, carvedilol 6.125 mg BID, and atorvastatin.  Clear liquids until seen by cardiology.    Stress test did not show stress induced ischemia     Episodic SVT and paroxysmal atrial atrial fibrillation:  [id number] Score 4 (HTN, age, sex).   Two further episodes of SVT on the unit,  with spontaneous resolution.  Started on enoxaparin by cardiology.   -echo ordered(noted EF 45%, mod hk, g2 dd, mod mr, mild tr, mild pulm htn)   -eliquis started on dc  -pt instructed to f/u with primary Cards(Dr. Carolin Dan) in 1 week    Hypertension:  Continued on spironolactone 25 mg and valsartan 40 mg daily.     Dyslipidemia:  Continued atorvastatin 20 mg nightly.      History of gout:  Continued allopurinol 100 mg daily.  Check uric acid only if flare.       Hypovolemic hyponatremia: Improving with volume expansion with normal saline.  Recheck noted improvement     Hypothyroid (TSH 1.3):  Continue levothyroxine 100 mcg daily.     Chronic constipation:  Bowel movement since hospitalized.  Continues on docusate and psyllium.  [employer] is not on formulary.  She also takes metamucil.      History of bilateral knee replacement.           Physical Exam Performed:     /89   Pulse 78   Temp 98.1 °F (36.7 °C) (Oral)   Resp 16   Ht 5' 2.5\" (1.588 m)   Wt 181 lb 1.6 oz (82.1 kg)   SpO2 94%   BMI 32.60 kg/m²       General appearance:  No apparent distress, appears stated age and cooperative. HEENT:  Normal cephalic, atraumatic without obvious deformity. Pupils equal, round, and reactive to light. Extra ocular muscles intact. Conjunctivae/corneas clear. Neck: Supple, with full range of motion. No jugular venous distention. Trachea midline. Respiratory:  Normal respiratory effort. Clear to auscultation, bilaterally without Rales/Wheezes/Rhonchi. Cardiovascular:  Regular rate and rhythm with normal S1/S2 without murmurs, rubs or gallops. Abdomen: Soft, non-tender, non-distended with normal bowel sounds. Musculoskeletal:  No clubbing, cyanosis or edema bilaterally. Full range of motion without deformity. Skin: Skin color, texture, turgor normal.  No rashes or lesions. Neurologic:  Neurovascularly intact without any focal sensory/motor deficits.  Cranial nerves: II-XII intact, grossly non-focal.  Psychiatric:  Alert and oriented, thought content appropriate, Details   aspirin 81 MG chewable tablet Take 1 tablet by mouth daily, Disp-30 tablet, R-3Print      metoprolol succinate (TOPROL XL) 50 MG extended release tablet Take 1 tablet by mouth 2 times daily, Disp-90 tablet, R-0Normal      apixaban (ELIQUIS) 2.5 MG TABS tablet Take 1 tablet by mouth 2 times daily, Disp-60 tablet, R-0Normal              Details   diclofenac (VOLTAREN) 25 MG EC tablet Take 1 tablet by mouth 2 times daily, Disp-20 tablet, R-0Print      valsartan (DIOVAN) 40 MG tablet Take 1 tablet by mouth daily, Disp-30 tablet, R-3NO PRINT      lidocaine (LIDODERM) 5 % Place 2 patches onto the skin daily 12 hours on, 12 hours off., Disp-30 patch, R-0NO PRINT      spironolactone (ALDACTONE) 25 MG tablet Take 1 tablet by mouth daily, Disp-30 tablet, R-3NO PRINT      Acetaminophen 325 MG CAPS Take 650 mg by mouth 3 times dailyHistorical Med      hydrocortisone (ANUSOL-HC) 2.5 % rectal cream Place rectally 2 times daily Place rectally 2 times daily. , Rectal, 2 TIMES DAILY, Historical Med      psyllium (KONSYL) 28.3 % PACK Take 1 packet by mouth dailyHistorical Med      NONFORMULARY Historical Med      atorvastatin (LIPITOR) 20 MG tablet Take 20 mg by mouth dailyHistorical Med      tolterodine (DETROL LA) 2 MG extended release capsule Take 2 mg by mouth Daily with supperHistorical Med      methenamine (MANDELAMINE) 1 g tablet Take 1 g by mouth 2 times dailyHistorical Med      Cholecalciferol (VITAMIN D) 2000 units CAPS capsule Take by mouth DailyHistorical Med      estradiol (ESTRACE) 0.1 MG/GM vaginal cream Place 1 g vaginally Twice a Week Historical Med      Cyanocobalamin (VITAMIN B 12 PO) Take 1,000 Units by mouth daily       allopurinol (ZYLOPRIM) 100 MG tablet Take 100 mg by mouth daily      docusate sodium (COLACE) 100 MG capsule Take 100 mg by mouth 2 times daily      nitroGLYCERIN (NITROSTAT) 0.4 MG SL tablet Place 0.4 mg under the tongue every 5 minutes as needed (never  used).       meclizine (ANTIVERT) 25 MG CHEW Take 25 mg by mouth 3 times daily as needed (not used recently). levothyroxine (SYNTHROID) 100 MCG tablet Take 100 mcg by mouth daily. Time Spent on discharge is more than 30 minutes in the examination, evaluation, counseling and review of medications and discharge plan. Signed:    Joselin Hagan MD   7/1/2019      Thank you James Dias MD for the opportunity to be involved in this patient's care. If you have any questions or concerns please feel free to contact me at [phone].

## 2019-06-25 NOTE — PROGRESS NOTES
[address]     Electrophysiology                                     Progress Note    Admission date:  2019    Reason for follow up visit: AT/AF    HPI/CC: Shaunna Hicks was admitted on 2019 with SOB and nausea/emesis. PAF and AT noted on telemetry. No further arrhythmias noted sonce 2019. Rhythm has been sinus. Stress negative this admission. Subjective: She complains of nasal congestion and sore throat. Denies chest pain, palpitations, shortness of breath, and dizziness. Vitals:  Blood pressure 139/89, pulse 78, temperature 98.1 °F (36.7 °C), temperature source Oral, resp. rate 16, height 5' 2.5\" (1.588 m), weight 181 lb 1.6 oz (82.1 kg), SpO2 94 %.   Temp  Av.1 °F (36.7 °C)  Min: 97.6 °F (36.4 °C)  Max: 98.7 °F (37.1 °C)  Pulse  Av.9  Min: 65  Max: 78  BP  Min: 96/58  Max: 154/76  SpO2  Av.1 %  Min: 92 %  Max: 95 %    24 hour I/O    Intake/Output Summary (Last 24 hours) at 2019 1515  Last data filed at 2019 6711  Gross per 24 hour   Intake 730 ml   Output 900 ml   Net -170 ml     Current Facility-Administered Medications   Medication Dose Route Frequency Provider Last Rate Last Dose    albuterol sulfate  (90 Base) MCG/ACT inhaler 2 puff  2 puff Inhalation Q6H PRN Scot Webb MD        technetium tetrofosmin (Tc-MYOVIEW) injection 89.0 millicurie  02.8 millicurie Intravenous ONCE PRN Rajinder Jamse Hamman, MD        metoprolol succinate (TOPROL XL) extended release tablet 50 mg  50 mg Oral BID Jose Brady MD   50 mg at 19 09    fluticasone (FLONASE) 50 MCG/ACT nasal spray 1 spray  1 spray Each Nostril BID Scot Webb MD   1 spray at 19 09    enoxaparin (LOVENOX) injection 80 mg  1 mg/kg Subcutaneous BID Qamar Villatoro MD   80 mg at 19 0902    allopurinol (ZYLOPRIM) tablet 100 mg  100 mg Oral Daily Scot Webb MD   100 mg at 19    aspirin EC tablet 81 mg  81 mg Oral Daily ROBYN Webb MD        atorvastatin (LIPITOR) tablet 20 mg  20 mg Oral Daily Scot Webb MD   20 mg at 06/24/19 2144    vitamin D (CHOLECALCIFEROL) tablet 2,000 Units  2,000 Units Oral Daily Scot Webb MD   2,000 Units at 06/25/19 0902    docusate sodium (COLACE) capsule 100 mg  100 mg Oral BID Scot Webb MD   100 mg at 06/25/19 0901    levothyroxine (SYNTHROID) tablet 100 mcg  100 mcg Oral Daily Scot Webb MD   100 mcg at 06/25/19 0521    lidocaine 4 % external patch 1 patch  1 patch Transdermal Daily Scot Webb MD   Stopped at 06/24/19 0839    psyllium (METAMUCIL) 58.12 % packet 1 packet  1 packet Oral Daily Scot Webb MD   Stopped at 06/24/19 0840    spironolactone (ALDACTONE) tablet 25 mg  25 mg Oral Daily Scot Webb MD   25 mg at 06/25/19 0902    trospium (SANCTURA) tablet 20 mg  20 mg Oral BID AC Scot Webb MD   20 mg at 06/25/19 0900    valsartan (DIOVAN) tablet 40 mg  40 mg Oral Daily Scot Webb MD   40 mg at 06/25/19 0901    sodium chloride flush 0.9 % injection 10 mL  10 mL Intravenous 2 times per day Scot Webb MD   10 mL at 06/25/19 0902    sodium chloride flush 0.9 % injection 10 mL  10 mL Intravenous PRN Scot Webb MD        magnesium hydroxide (MILK OF MAGNESIA) 400 MG/5ML suspension 30 mL  30 mL Oral Daily PRN Scot Webb MD        ondansetron [facility]) injection 4 mg  4 mg Intravenous Q6H PRN Scot Webb MD   4 mg at 06/24/19 0840    aspirin chewable tablet 81 mg  81 mg Oral Daily Scot Webb MD   81 mg at 06/25/19 6514    acetaminophen (TYLENOL) tablet 650 mg  650 mg Oral Q4H PRN Scot Webb MD   650 mg at 06/25/19 1313       Objective:     Telemetry monitor:     Physical Exam:  Constitutional and general appearance: alert, cooperative, no distress and appears stated age  HEENT: PERRL, no cervical lymphadenopathy.  No masses palpable. Normal oral mucosa  Respiratory:  · Normal excursion and expansion without use of accessory muscles  · Resp auscultation: Normal breath sounds without wheezing, rhonchi, and rales  Cardiovascular:  · The apical impulse is not displaced  · Heart tones are crisp and normal. regular S1 and S2.  · Jugular venous pulsation Normal  · The carotid upstroke is normal in amplitude and contour without delay or bruit  · Peripheral pulses are symmetrical and full   Abdomen:  · No masses or tenderness  · Bowel sounds present  Extremities:  ·  No cyanosis or clubbing  ·  No lower extremity edema  ·  Skin: warm and dry  Neurological:  · Alert and oriented  · Moves all extremities well  · No abnormalities of mood, affect, memory, mentation, or behavior are noted    Data  EKG 6/23/2019:  SR with a HR of 69      Echo 6/24/2019: The left ventricular systolic function is mildly reduced with an ejection   fraction of 45 %.   There is moderate hypokinesis of the apical anteroseptal wall.   Grade II diastolic dysfunction with elevated left ventricular filling   pressure.   The left atrium is moderately dilated.   Mild to moderate mitral regurgitation.   Mild tricuspid regurgitation.   Systolic pulmonary artery pressure (SPAP) is estimated at 43 mmHg consistent   with mild pulmonary hypertension (Right atrial pressure of 8 mmHg). Echo 8/6/2018:  Low normal systolic function with an estimated ejection fraction of 50%. Hypokinetc apex, apical inferior and apical septal walls. Normal left ventricular diastolic filling pressure. Systolic pulmonary artery pressure (SPAP) is normal and estimated at 26 mmHg  (right atrial pressure 3 mmHg). Stress 6/24/2019: There is no evidence of stress induced ischemia. There are small-moderate    sized fixed defects in the anteroseptal, anteroapical, and apical-septal    segments c/w infarction in the LAD and/or CCx territory.  LV function is    normal with apical hypokinesis and ejection

## 2019-06-25 NOTE — PROGRESS NOTES
Bedside rounding with Dr. Hope Kumar. All patients questions answered at bedside. Current plan is to increase activity and getting up out of bed and waiting for cardiology to round on patient today for medication adjustments and to review cardiac testing. Possible discharge later today pending cardiology recommendations.

## 2019-06-25 NOTE — PROGRESS NOTES
Patients grandson showed up and is checking to see if he can make room in his car for patient and her belongings so the patient doesn't have to wait for her daughter. Reviewed discharge instructions with patient including new medications use and side effects, when to take her medications, when to follow up with PCP, and when to call the doctor. Patient verbalized understanding and denies having any questions at this time. IV removed without complications. Tele box # [id number] removed from patient, [address] notified. PCA is helping patient to get dressed and waiting for grandson to come back .

## 2019-06-27 NOTE — CARE COORDINATION
Received request from Ed Yusuf, RN/DCP, re: pt discharged home late on 6/25/19 and [address] needs [address] order, signed BRIAN. Writer spoke with DR. Garcia this date and he has placed order and signed the [employer]. This has been faxed to [facility].   Andrew DAILEY

## 2021-03-09 ENCOUNTER — HOSPITAL ENCOUNTER (INPATIENT)
Age: 86
LOS: 5 days | Discharge: HOME HEALTH | DRG: 194 | End: 2021-03-14
Attending: EMERGENCY MEDICINE | Admitting: INTERNAL MEDICINE
Payer: MEDICARE

## 2021-03-09 ENCOUNTER — APPOINTMENT (OUTPATIENT)
Dept: GENERAL RADIOLOGY | Age: 86
DRG: 194 | End: 2021-03-09
Payer: MEDICARE

## 2021-03-09 DIAGNOSIS — J96.01 ACUTE HYPOXEMIC RESPIRATORY FAILURE (HCC): ICD-10-CM

## 2021-03-09 DIAGNOSIS — J18.9 PNEUMONIA DUE TO ORGANISM: Primary | ICD-10-CM

## 2021-03-09 LAB
A/G RATIO: 1.2 (ref 1.1–2.2)
ALBUMIN SERPL-MCNC: 4.1 G/DL (ref 3.4–5)
ALP BLD-CCNC: 64 U/L (ref 40–129)
ALT SERPL-CCNC: 11 U/L (ref 10–40)
ANION GAP SERPL CALCULATED.3IONS-SCNC: 11 MMOL/L (ref 3–16)
AST SERPL-CCNC: 15 U/L (ref 15–37)
BASOPHILS ABSOLUTE: 0.1 K/UL (ref 0–0.2)
BASOPHILS RELATIVE PERCENT: 0.8 %
BILIRUB SERPL-MCNC: 0.6 MG/DL (ref 0–1)
BUN BLDV-MCNC: 31 MG/DL (ref 7–20)
CALCIUM SERPL-MCNC: 9.7 MG/DL (ref 8.3–10.6)
CHLORIDE BLD-SCNC: 104 MMOL/L (ref 99–110)
CO2: 20 MMOL/L (ref 21–32)
CREAT SERPL-MCNC: 1.3 MG/DL (ref 0.6–1.2)
EKG ATRIAL RATE: 63 BPM
EKG DIAGNOSIS: NORMAL
EKG P AXIS: 73 DEGREES
EKG P-R INTERVAL: 200 MS
EKG Q-T INTERVAL: 438 MS
EKG QRS DURATION: 88 MS
EKG QTC CALCULATION (BAZETT): 448 MS
EKG R AXIS: -21 DEGREES
EKG T AXIS: 40 DEGREES
EKG VENTRICULAR RATE: 63 BPM
EOSINOPHILS ABSOLUTE: 0.1 K/UL (ref 0–0.6)
EOSINOPHILS RELATIVE PERCENT: 2 %
GFR AFRICAN AMERICAN: 47
GFR NON-AFRICAN AMERICAN: 38
GLOBULIN: 3.4 G/DL
GLUCOSE BLD-MCNC: 168 MG/DL (ref 70–99)
HCT VFR BLD CALC: 33 % (ref 36–48)
HEMOGLOBIN: 10.9 G/DL (ref 12–16)
LACTIC ACID, SEPSIS: 1.1 MMOL/L (ref 0.4–1.9)
LYMPHOCYTES ABSOLUTE: 1.1 K/UL (ref 1–5.1)
LYMPHOCYTES RELATIVE PERCENT: 15.7 %
MCH RBC QN AUTO: 30.7 PG (ref 26–34)
MCHC RBC AUTO-ENTMCNC: 33.1 G/DL (ref 31–36)
MCV RBC AUTO: 92.6 FL (ref 80–100)
MONOCYTES ABSOLUTE: 0.6 K/UL (ref 0–1.3)
MONOCYTES RELATIVE PERCENT: 8.6 %
NEUTROPHILS ABSOLUTE: 5.3 K/UL (ref 1.7–7.7)
NEUTROPHILS RELATIVE PERCENT: 72.9 %
PDW BLD-RTO: 14.6 % (ref 12.4–15.4)
PLATELET # BLD: 159 K/UL (ref 135–450)
PMV BLD AUTO: 9.2 FL (ref 5–10.5)
POTASSIUM SERPL-SCNC: 3.9 MMOL/L (ref 3.5–5.1)
PROCALCITONIN: 0.02 NG/ML (ref 0–0.15)
RBC # BLD: 3.56 M/UL (ref 4–5.2)
SARS-COV-2, NAAT: NOT DETECTED
SODIUM BLD-SCNC: 135 MMOL/L (ref 136–145)
TOTAL PROTEIN: 7.5 G/DL (ref 6.4–8.2)
TROPONIN: <0.01 NG/ML
WBC # BLD: 7.3 K/UL (ref 4–11)

## 2021-03-09 PROCEDURE — 80053 COMPREHEN METABOLIC PANEL: CPT

## 2021-03-09 PROCEDURE — 2580000003 HC RX 258: Performed by: EMERGENCY MEDICINE

## 2021-03-09 PROCEDURE — 94761 N-INVAS EAR/PLS OXIMETRY MLT: CPT

## 2021-03-09 PROCEDURE — 85025 COMPLETE CBC W/AUTO DIFF WBC: CPT

## 2021-03-09 PROCEDURE — 93005 ELECTROCARDIOGRAM TRACING: CPT | Performed by: EMERGENCY MEDICINE

## 2021-03-09 PROCEDURE — 84484 ASSAY OF TROPONIN QUANT: CPT

## 2021-03-09 PROCEDURE — 6370000000 HC RX 637 (ALT 250 FOR IP): Performed by: NURSE PRACTITIONER

## 2021-03-09 PROCEDURE — 6360000002 HC RX W HCPCS: Performed by: EMERGENCY MEDICINE

## 2021-03-09 PROCEDURE — 84145 PROCALCITONIN (PCT): CPT

## 2021-03-09 PROCEDURE — 6360000002 HC RX W HCPCS: Performed by: NURSE PRACTITIONER

## 2021-03-09 PROCEDURE — 2580000003 HC RX 258: Performed by: NURSE PRACTITIONER

## 2021-03-09 PROCEDURE — 87040 BLOOD CULTURE FOR BACTERIA: CPT

## 2021-03-09 PROCEDURE — 99284 EMERGENCY DEPT VISIT MOD MDM: CPT

## 2021-03-09 PROCEDURE — 87635 SARS-COV-2 COVID-19 AMP PRB: CPT

## 2021-03-09 PROCEDURE — 1200000000 HC SEMI PRIVATE

## 2021-03-09 PROCEDURE — 2700000000 HC OXYGEN THERAPY PER DAY

## 2021-03-09 PROCEDURE — 93010 ELECTROCARDIOGRAM REPORT: CPT | Performed by: INTERNAL MEDICINE

## 2021-03-09 PROCEDURE — 83605 ASSAY OF LACTIC ACID: CPT

## 2021-03-09 PROCEDURE — 71045 X-RAY EXAM CHEST 1 VIEW: CPT

## 2021-03-09 RX ORDER — DOCUSATE SODIUM 100 MG/1
100 CAPSULE, LIQUID FILLED ORAL 2 TIMES DAILY
Status: DISCONTINUED | OUTPATIENT
Start: 2021-03-09 | End: 2021-03-14 | Stop reason: HOSPADM

## 2021-03-09 RX ORDER — ASPIRIN 81 MG/1
81 TABLET, CHEWABLE ORAL DAILY
Status: DISCONTINUED | OUTPATIENT
Start: 2021-03-09 | End: 2021-03-14 | Stop reason: HOSPADM

## 2021-03-09 RX ORDER — METOPROLOL SUCCINATE 50 MG/1
50 TABLET, EXTENDED RELEASE ORAL 2 TIMES DAILY
Status: DISCONTINUED | OUTPATIENT
Start: 2021-03-09 | End: 2021-03-14 | Stop reason: HOSPADM

## 2021-03-09 RX ORDER — ACETAMINOPHEN 325 MG/1
650 TABLET ORAL EVERY 6 HOURS PRN
Status: DISCONTINUED | OUTPATIENT
Start: 2021-03-09 | End: 2021-03-14 | Stop reason: HOSPADM

## 2021-03-09 RX ORDER — POLYETHYLENE GLYCOL 3350 17 G/17G
17 POWDER, FOR SOLUTION ORAL DAILY PRN
Status: DISCONTINUED | OUTPATIENT
Start: 2021-03-09 | End: 2021-03-14 | Stop reason: HOSPADM

## 2021-03-09 RX ORDER — SODIUM CHLORIDE 0.9 % (FLUSH) 0.9 %
10 SYRINGE (ML) INJECTION EVERY 12 HOURS SCHEDULED
Status: DISCONTINUED | OUTPATIENT
Start: 2021-03-09 | End: 2021-03-14 | Stop reason: HOSPADM

## 2021-03-09 RX ORDER — ACETAMINOPHEN 650 MG/1
650 SUPPOSITORY RECTAL EVERY 6 HOURS PRN
Status: DISCONTINUED | OUTPATIENT
Start: 2021-03-09 | End: 2021-03-14 | Stop reason: HOSPADM

## 2021-03-09 RX ORDER — LEVOTHYROXINE SODIUM 0.1 MG/1
100 TABLET ORAL DAILY
Status: DISCONTINUED | OUTPATIENT
Start: 2021-03-09 | End: 2021-03-11

## 2021-03-09 RX ORDER — METHENAMINE MANDELATE 500 MG/1
1 TABLET ORAL 2 TIMES DAILY
Status: DISCONTINUED | OUTPATIENT
Start: 2021-03-09 | End: 2021-03-09

## 2021-03-09 RX ORDER — ONDANSETRON 2 MG/ML
4 INJECTION INTRAMUSCULAR; INTRAVENOUS EVERY 6 HOURS PRN
Status: DISCONTINUED | OUTPATIENT
Start: 2021-03-09 | End: 2021-03-14 | Stop reason: HOSPADM

## 2021-03-09 RX ORDER — ATORVASTATIN CALCIUM 10 MG/1
20 TABLET, FILM COATED ORAL DAILY
Status: DISCONTINUED | OUTPATIENT
Start: 2021-03-09 | End: 2021-03-14 | Stop reason: HOSPADM

## 2021-03-09 RX ORDER — ALLOPURINOL 100 MG/1
100 TABLET ORAL DAILY
Status: DISCONTINUED | OUTPATIENT
Start: 2021-03-09 | End: 2021-03-14 | Stop reason: HOSPADM

## 2021-03-09 RX ORDER — PROMETHAZINE HYDROCHLORIDE 25 MG/1
12.5 TABLET ORAL EVERY 6 HOURS PRN
Status: DISCONTINUED | OUTPATIENT
Start: 2021-03-09 | End: 2021-03-14 | Stop reason: HOSPADM

## 2021-03-09 RX ORDER — SODIUM CHLORIDE 0.9 % (FLUSH) 0.9 %
10 SYRINGE (ML) INJECTION PRN
Status: DISCONTINUED | OUTPATIENT
Start: 2021-03-09 | End: 2021-03-14 | Stop reason: HOSPADM

## 2021-03-09 RX ADMIN — VANCOMYCIN HYDROCHLORIDE 2000 MG: 1 INJECTION, POWDER, LYOPHILIZED, FOR SOLUTION INTRAVENOUS at 13:46

## 2021-03-09 RX ADMIN — AZITHROMYCIN MONOHYDRATE 500 MG: 500 INJECTION, POWDER, LYOPHILIZED, FOR SOLUTION INTRAVENOUS at 16:03

## 2021-03-09 RX ADMIN — Medication 10 ML: at 21:10

## 2021-03-09 RX ADMIN — CEFTRIAXONE SODIUM 1000 MG: 1 INJECTION, POWDER, FOR SOLUTION INTRAMUSCULAR; INTRAVENOUS at 15:38

## 2021-03-09 RX ADMIN — APIXABAN 2.5 MG: 2.5 TABLET, FILM COATED ORAL at 21:09

## 2021-03-09 RX ADMIN — CEFEPIME HYDROCHLORIDE 2000 MG: 2 INJECTION, POWDER, FOR SOLUTION INTRAVENOUS at 11:50

## 2021-03-09 ASSESSMENT — ENCOUNTER SYMPTOMS: TACHYPNEA: 1

## 2021-03-09 ASSESSMENT — PAIN SCALES - GENERAL
PAINLEVEL_OUTOF10: 0
PAINLEVEL_OUTOF10: 7

## 2021-03-09 NOTE — ED NOTES
Patient hypoxic on room air 84% placed on 3L. Currently 94%. Asymptomatic.       Roxy Wagner RN  03/09/21 [id number]

## 2021-03-09 NOTE — H&P
Hospital Medicine History & Physical      PCP: Sindhu Davis MD    Date of Admission: 3/9/2021    Date of Service: Pt seen/examined on 3/9/2021 and Admitted to Inpatient with expected LOS greater than two midnights due to medical therapy. Chief Complaint:  Cough and SOB    History Of Present Illness:      80 y.o. female, with a PMH of CAD, CHF, HTN, and HLD, who presented to [facility] with cough and SOB. The patient complains of having a fever, cough, body aches, and headache for the previous 3 days. She had her 2nd COVID vaccine last on 2/11. She was very worried that perhaps, she did have COVID, so she decided to seek treatment. She denies abdominal pain, nausea, vomiting. Past Medical History:          Diagnosis Date    Arthritis     CAD (coronary artery disease)     stent placed 2007    [facility]est[facility] [address]     Depression 1980    treated with electoshock successfully    Diphtheria     age 15   Mattie Svetlana Gout     Hyperlipemia     Hypertension     STEMI (ST elevation myocardial infarction) ([facility] [address] 2007    Thyroid disease     Wears hearing aid     left ear       Past Surgical History:          Procedure Laterality Date    APPENDECTOMY      BREAST SURGERY      biopsy benign    CARDIAC SURGERY  2007    stent    CATARACT REMOVAL WITH IMPLANT Right 10/11/2017    COLONOSCOPY  4/29/2013    HYSTERECTOMY      JOINT REPLACEMENT Left 8/13/14    LEFT TOTAL KNEE REPLACEMENT WITH FEMORAL NERVE BLOCK FOR PAIN    OTHER SURGICAL HISTORY Right 1/6/14    right total knee replacement    TONSILLECTOMY         Medications Prior to Admission:      Prior to Admission medications    Medication Sig Start Date End Date Taking?  Authorizing Provider   aspirin 81 MG chewable tablet Take 1 tablet by mouth daily 6/26/19  Yes ROBERT David CNP   apixaban (ELIQUIS) 2.5 MG TABS tablet Take 1 tablet by mouth 2 times daily 6/25/19  Yes ROBERT David - CNP   Acetaminophen 325 MG CAPS Take 650 mg by mouth 3 times daily   Yes Historical Provider, MD   hydrocortisone (ANUSOL-HC) 2.5 % rectal cream Place rectally 2 times daily Place rectally 2 times daily. Yes Historical Provider, MD   atorvastatin (LIPITOR) 20 MG tablet Take 20 mg by mouth daily   Yes Historical Provider, MD   meclizine (ANTIVERT) 25 MG CHEW Take 25 mg by mouth 3 times daily as needed (not used recently). Yes Historical Provider, MD   metoprolol succinate (TOPROL XL) 50 MG extended release tablet Take 1 tablet by mouth 2 times daily 6/25/19   ROBERT Best CNP   diclofenac (VOLTAREN) 25 MG EC tablet Take 1 tablet by mouth 2 times daily 3/6/19   Alexus Thomson DO   valsartan (DIOVAN) 40 MG tablet Take 1 tablet by mouth daily 8/10/18   Phoenix Hall MD   lidocaine (LIDODERM) 5 % Place 2 patches onto the skin daily 12 hours on, 12 hours off. 8/9/18   Phoenix Hall MD   spironolactone (ALDACTONE) 25 MG tablet Take 1 tablet by mouth daily 8/10/18   Phoenix Hall MD   psyllium (KONSYL) 28.3 % PACK Take 1 packet by mouth daily    Historical Provider, MD   NONFORMULARY     Historical Provider, MD   tolterodine (DETROL LA) 2 MG extended release capsule Take 2 mg by mouth Daily with supper    Historical Provider, MD   methenamine (MANDELAMINE) 1 g tablet Take 1 g by mouth 2 times daily    Historical Provider, MD   Cholecalciferol (VITAMIN D) 2000 units CAPS capsule Take by mouth Daily    Historical Provider, MD   estradiol (ESTRACE) 0.1 MG/GM vaginal cream Place 1 g vaginally Twice a Week     Historical Provider, MD   Cyanocobalamin (VITAMIN B 12 PO) Take 1,000 Units by mouth daily     Historical Provider, MD   allopurinol (ZYLOPRIM) 100 MG tablet Take 100 mg by mouth daily    Historical Provider, MD   docusate sodium (COLACE) 100 MG capsule Take 100 mg by mouth 2 times daily    Historical Provider, MD   nitroGLYCERIN (NITROSTAT) 0.4 MG SL tablet Place 0.4 mg under the tongue every 5 minutes as needed (never  used). Historical Provider, MD   levothyroxine (SYNTHROID) 100 MCG tablet Take 100 mcg by mouth daily. Historical Provider, MD       Allergies:  Ace inhibitors, Amoxicillin, Bactrim [sulfamethoxazole-trimethoprim], Ciprofloxacin, Clindamycin/lincomycin, Naproxen, Statins, Hydrochlorothiazide, and Sulfa antibiotics    Social History:      The [facility]. TOBACCO:   reports that she quit smoking about 20 years ago. Her smoking use included cigarettes. She has a 20.00 pack-year smoking history. She has never used smokeless tobacco.  ETOH:   reports no history of alcohol use. E-Cigarettes/Vaping Use     Questions Responses    E-Cigarette/Vaping Use     Start Date     Passive Exposure     Quit Date     Counseling Given     Comments             Family History:      Reviewed in detail. Positive as follows:        Problem Relation Age of Onset    Cancer Brother         colon       REVIEW OF SYSTEMS:   Pertinent positives as noted in the HPI. All other systems reviewed and negative. PHYSICAL EXAM PERFORMED:    /70   Pulse 58   Temp 98.2 °F (36.8 °C) (Oral)   Resp 22   Wt 181 lb (82.1 kg)   SpO2 95%   BMI 32.58 kg/m²     General appearance:  No apparent distress, appears stated age and cooperative. HEENT:  Normal cephalic, atraumatic without obvious deformity. Pupils equal, round, and reactive to light. Extra ocular muscles intact. Conjunctivae/corneas clear. Neck: Supple, with full range of motion. No jugular venous distention. Trachea midline. Respiratory:  Normal respiratory effort. Clear to auscultation, bilaterally without Rales/Wheezes/Rhonchi. Cardiovascular:  Regular rate and rhythm with normal S1/S2 without murmurs, rubs or gallops. Abdomen: Soft, non-tender, non-distended with normal bowel sounds. Musculoskeletal:  No clubbing, cyanosis or edema bilaterally. Full range of motion without deformity.   Skin: Skin color, texture, turgor normal. Eliquis Toprol. Chronic combined CHF - stable without exacerbation.  - last TTE 2019 - EF 45% with grade II DD.  - hold home aldactone for now. Continue home BB and ARB. - daily weights, I&O    CAD - stable. Continue home ASA, statin, BB, ARB. TSH - continue levothyroxine. UTI prophylaxis therapy - hold home mendelamine while on abx. DVT Prophylaxis: Eliquis  Diet: DIET GENERAL;  Code Status: Full Code    PT/OT Eval Status: ordered    Dispo - likely 1-2 days inpatient       ROBERT Alicea - CNP    Thank you Elisa Senior MD for the opportunity to be involved in this patient's care. If you have any questions or concerns please feel free to contact me at [phone].

## 2021-03-09 NOTE — ED PROVIDER NOTES
Emergency Physician Note    Chief Complaint  Cough (x 3 days , had second covid vaccine on feb 11th) and Shortness of Breath       History of Present Illness  Kamala Torrez is a 80 y.o. female who presents to the ED for shortness of breath. Patient reports for the last 3 days she has had cold symptoms including congestion and a light cough that is nonproductive. She developed shortness of breath over the last day. She denies any sore throat. No Covid exposure. She received the second Covid vaccine on February 11. She denies also any fevers, chills or sweats. She also denies vomiting or diarrhea. 10 systems reviewed, pertinent positives per HPI otherwise noted to be negative    I have reviewed the following from the nursing documentation:      Prior to Admission medications    Medication Sig Start Date End Date Taking? Authorizing Provider   aspirin 81 MG chewable tablet Take 1 tablet by mouth daily 6/26/19   ROBERT Singh CNP   metoprolol succinate (TOPROL XL) 50 MG extended release tablet Take 1 tablet by mouth 2 times daily 6/25/19   ROBERT Singh CNP   apixaban (ELIQUIS) 2.5 MG TABS tablet Take 1 tablet by mouth 2 times daily 6/25/19   ROBERT Singh CNP   diclofenac (VOLTAREN) 25 MG EC tablet Take 1 tablet by mouth 2 times daily 3/6/19   Rebbecca Certain, DO   valsartan (DIOVAN) 40 MG tablet Take 1 tablet by mouth daily 8/10/18   Latosha Hall MD   lidocaine (LIDODERM) 5 % Place 2 patches onto the skin daily 12 hours on, 12 hours off. 8/9/18   Latosha Hall MD   spironolactone (ALDACTONE) 25 MG tablet Take 1 tablet by mouth daily 8/10/18   Latosha Hall MD   Acetaminophen 325 MG CAPS Take 650 mg by mouth 3 times daily    Historical Provider, MD   hydrocortisone (ANUSOL-HC) 2.5 % rectal cream Place rectally 2 times daily Place rectally 2 times daily.     Historical Provider, MD   psyllium (KONSYL) 28.3 % PACK Take 1 packet by mouth daily    Historical Provider, MD NONFORMULARY     Historical Provider, MD   atorvastatin (LIPITOR) 20 MG tablet Take 20 mg by mouth daily    Historical Provider, MD   tolterodine (DETROL LA) 2 MG extended release capsule Take 2 mg by mouth Daily with supper    Historical Provider, MD   methenamine (MANDELAMINE) 1 g tablet Take 1 g by mouth 2 times daily    Historical Provider, MD   Cholecalciferol (VITAMIN D) 2000 units CAPS capsule Take by mouth Daily    Historical Provider, MD   estradiol (ESTRACE) 0.1 MG/GM vaginal cream Place 1 g vaginally Twice a Week     Historical Provider, MD   Cyanocobalamin (VITAMIN B 12 PO) Take 1,000 Units by mouth daily     Historical Provider, MD   allopurinol (ZYLOPRIM) 100 MG tablet Take 100 mg by mouth daily    Historical Provider, MD   docusate sodium (COLACE) 100 MG capsule Take 100 mg by mouth 2 times daily    Historical Provider, MD   nitroGLYCERIN (NITROSTAT) 0.4 MG SL tablet Place 0.4 mg under the tongue every 5 minutes as needed (never  used). Historical Provider, MD   meclizine (ANTIVERT) 25 MG CHEW Take 25 mg by mouth 3 times daily as needed (not used recently). Historical Provider, MD   levothyroxine (SYNTHROID) 100 MCG tablet Take 100 mcg by mouth daily.       Historical Provider, MD       Allergies as of 03/09/2021 - Review Complete 03/09/2021   Allergen Reaction Noted    Ace inhibitors Other (See Comments) 04/30/2015    Amoxicillin Hives 10/08/2011    Bactrim [sulfamethoxazole-trimethoprim]  08/05/2018    Ciprofloxacin  08/05/2018    Clindamycin/lincomycin  08/05/2018    Naproxen Nausea Only 09/02/2013    Statins Other (See Comments) 04/29/2013    Hydrochlorothiazide Rash 04/29/2013    Sulfa antibiotics Rash 02/08/2014       Past Medical History:   Diagnosis Date    Arthritis     CAD (coronary artery disease)     stent placed 2007    [facility]est[facility] failure) [facility] [address]     Depression 1980    treated with electoshock successfully    Diphtheria     age 15   Michael Herrera Gout     Hyperlipemia  Hypertension     STEMI (ST elevation myocardial infarction) ([facility] [address]     Thyroid disease     Wears hearing aid     left ear        Surgical History:   Past Surgical History:   Procedure Laterality Date    APPENDECTOMY      BREAST SURGERY      biopsy benign    CARDIAC SURGERY  2007    stent    CATARACT REMOVAL WITH IMPLANT Right 10/11/2017    COLONOSCOPY  2013    HYSTERECTOMY      JOINT REPLACEMENT Left 14    LEFT TOTAL KNEE REPLACEMENT WITH FEMORAL NERVE BLOCK FOR PAIN    OTHER SURGICAL HISTORY Right 14    right total knee replacement    TONSILLECTOMY          Family History:    Family History   Problem Relation Age of Onset    Cancer Brother         colon       Social History     Socioeconomic History    Marital status:       Spouse name: Not on file    Number of children: Not on file    Years of education: Not on file    Highest education level: Not on file   Occupational History    Not on file   Social Needs    Financial resource strain: Not on file    Food insecurity     Worry: Not on file     Inability: Not on file    Transportation needs     Medical: Not on file     Non-medical: Not on file   Tobacco Use    Smoking status: Former Smoker     Packs/day: 0.50     Years: 40.00     Pack years: 20.00     Types: Cigarettes     Quit date: 2000     Years since quittin.6    Smokeless tobacco: Never Used    Tobacco comment: less than pack day   Substance and Sexual Activity    Alcohol use: No    Drug use: No    Sexual activity: Not on file   Lifestyle    Physical activity     Days per week: Not on file     Minutes per session: Not on file    Stress: Not on file   Relationships    Social connections     Talks on phone: Not on file     Gets together: Not on file     Attends Nondenominational service: Not on file     Active member of club or organization: Not on file     Attends meetings of clubs or organizations: Not on file     Relationship status: Not on file   Aur[place] Ponce Intimate partner violence     Fear of current or ex partner: Not on file     Emotionally abused: Not on file     Physically abused: Not on file     Forced sexual activity: Not on file   Other Topics Concern    Not on file   Social History Narrative    Not on file       Nursing notes reviewed. ED Triage Vitals [03/09/21 0947]   Enc Vitals Group      BP (!) 147/64      Pulse 64      Resp 16      Temp 98.6 °F (37 °C)      Temp Source Oral      SpO2 93 %      Weight 181 lb (82.1 kg)      Height       Head Circumference       Peak Flow       Pain Score       Pain Loc       Pain Edu? Excl. in [address]? GENERAL:  Awake, alert. Well developed, well nourished with no apparent distress. HENT:  Normocephalic, Atraumatic, moist mucous membranes. EYES:  Pupils equal round and reactive to light, Conjunctiva normal, extraocular movements normal.  NECK:  No meningeal signs, Supple. CHEST:  Regular rate and rhythm, chest wall non-tender. LUNGS: Scattered basilar rales bilaterally. ABDOMEN:  Soft, non-tender, no rebound, rigidity or guarding, non-distended, normal bowel sounds. No costovertebral angle tenderness to palpation. BACK:  No tenderness. EXTREMITIES:  Normal range of motion, no edema, no bony tenderness, no deformity, distal pulses present. SKIN: Warm, dry and intact. NEUROLOGIC: Normal mental status. Moving all extremities to command. LABS and DIAGNOSTIC RESULTS  EKG  The Ekg interpreted by me shows  normal sinus rhythm with a rate of 63  Axis is   Normal  QTc is  normal  Intervals and Durations are unremarkable. ST Segments: normal  PVC noted  Delta waves, Brugada Syndrome, and Short [place] are not present. No significant change from prior EKG dated 6/23/19    RADIOLOGY  X-RAYS:  I have reviewed radiologic plain film image(s). ALL OTHER NON-PLAIN FILM IMAGES SUCH AS CT, ULTRASOUND AND MRI HAVE BEEN READ BY THE RADIOLOGIST.   XR CHEST PORTABLE   Preliminary Result   Right basilar opacity, pneumonia versus atelectasis. Radiographic follow-up   is recommended. Mild cardiac silhouette enlargement. LABS  Labs Reviewed   CBC WITH AUTO DIFFERENTIAL - Abnormal; Notable for the following components:       Result Value    RBC 3.56 (*)     Hemoglobin 10.9 (*)     Hematocrit 33.0 (*)     All other components within normal limits    Narrative:     Performed at:  [address], [facility] [employer]   Phone [phone]   COMPREHENSIVE METABOLIC PANEL - Abnormal; Notable for the following components:    Sodium 135 (*)     CO2 20 (*)     Glucose 168 (*)     BUN 31 (*)     CREATININE 1.3 (*)     GFR Non- 38 (*)     GFR  47 (*)     All other components within normal limits    Narrative:     Performed at:  [address], [facility] [employer]   Phone [phone], RAPID    Narrative:     Performed at:  [place] [address] [employer]   Phone [phone]   CULTURE, BLOOD 1   CULTURE, BLOOD 2   TROPONIN    Narrative:     Performed at:  [address], [facility] [employer]   Phone [phone]   LACTATE, SEPSIS    Narrative:     Performed at:  [address], [facility] [employer]   Phone [phone]   PROCALCITONIN    Narrative:     Performed at:  [address], [facility] [employer]   Phone [phone]   LACTATE, [address]        Patient has a new oxygen requirement today which is the reason for admission. The total Critical Care time is 45 minutes which excludes separately billable procedures. The critical care was concerning treatment for pneumonia and respiratory failure with IV antibiotics and supplemental oxygen.   This time is exclusive of any time documented by any other providers. I spoke with Dr. Edwin Ruffin. We thoroughly discussed the history, physical exam, laboratory and imaging studies, as well as, emergency department course. Based upon that discussion, we've decided to admit [address] [place] [address],[address] for further observation and evaluation of Kamala Carpenter's dyspnea. As I have deemed necessary from their history, physical, and studies, I have considered and evaluated [address] [place] [address],[address] for the following diagnoses:  ACUTE CORONARY SYNDROME, CHRONIC OBSTRUCTIVE PULMONARY DISEASE, CONGESTIVE HEART FAILURE, PERICARDIAL TAMPONADE, PNEUMONIA, PNEUMOTHORAX, PULMONARY EMBOLISM, SEPSIS, and THORACIC DISSECTION. FINAL IMPRESSION  1. Pneumonia due to organism    2. Acute hypoxemic respiratory failure (HCC)        Vitals:  Blood pressure 123/70, pulse 58, temperature 98.2 °F (36.8 °C), temperature source Oral, resp. rate 22, weight 181 lb (82.1 kg), SpO2 95 %. Disposition  Pt is in stable condition upon Admit to telemetry. This chart was generated using the [address] [place] [address] dictation system. I created this record but it may contain dictation errors.           Pierre Mathis MD  03/09/21 9484

## 2021-03-10 LAB
ANION GAP SERPL CALCULATED.3IONS-SCNC: 9 MMOL/L (ref 3–16)
BASOPHILS ABSOLUTE: 0 K/UL (ref 0–0.2)
BASOPHILS RELATIVE PERCENT: 0.6 %
BUN BLDV-MCNC: 29 MG/DL (ref 7–20)
CALCIUM SERPL-MCNC: 9.4 MG/DL (ref 8.3–10.6)
CHLORIDE BLD-SCNC: 106 MMOL/L (ref 99–110)
CO2: 24 MMOL/L (ref 21–32)
CREAT SERPL-MCNC: 1.1 MG/DL (ref 0.6–1.2)
EOSINOPHILS ABSOLUTE: 0.3 K/UL (ref 0–0.6)
EOSINOPHILS RELATIVE PERCENT: 4.9 %
GFR AFRICAN AMERICAN: 56
GFR NON-AFRICAN AMERICAN: 47
GLUCOSE BLD-MCNC: 103 MG/DL (ref 70–99)
HCT VFR BLD CALC: 33.1 % (ref 36–48)
HEMOGLOBIN: 10.8 G/DL (ref 12–16)
LYMPHOCYTES ABSOLUTE: 1.6 K/UL (ref 1–5.1)
LYMPHOCYTES RELATIVE PERCENT: 25.7 %
MCH RBC QN AUTO: 30.6 PG (ref 26–34)
MCHC RBC AUTO-ENTMCNC: 32.8 G/DL (ref 31–36)
MCV RBC AUTO: 93.4 FL (ref 80–100)
MONOCYTES ABSOLUTE: 0.7 K/UL (ref 0–1.3)
MONOCYTES RELATIVE PERCENT: 11.9 %
NEUTROPHILS ABSOLUTE: 3.5 K/UL (ref 1.7–7.7)
NEUTROPHILS RELATIVE PERCENT: 56.9 %
PDW BLD-RTO: 14.8 % (ref 12.4–15.4)
PLATELET # BLD: 151 K/UL (ref 135–450)
PMV BLD AUTO: 9.4 FL (ref 5–10.5)
POTASSIUM REFLEX MAGNESIUM: 4.2 MMOL/L (ref 3.5–5.1)
RBC # BLD: 3.54 M/UL (ref 4–5.2)
SODIUM BLD-SCNC: 139 MMOL/L (ref 136–145)
WBC # BLD: 6.2 K/UL (ref 4–11)

## 2021-03-10 PROCEDURE — 36415 COLL VENOUS BLD VENIPUNCTURE: CPT

## 2021-03-10 PROCEDURE — 97116 GAIT TRAINING THERAPY: CPT

## 2021-03-10 PROCEDURE — 94761 N-INVAS EAR/PLS OXIMETRY MLT: CPT

## 2021-03-10 PROCEDURE — 97161 PT EVAL LOW COMPLEX 20 MIN: CPT

## 2021-03-10 PROCEDURE — 6370000000 HC RX 637 (ALT 250 FOR IP): Performed by: NURSE PRACTITIONER

## 2021-03-10 PROCEDURE — 2580000003 HC RX 258: Performed by: NURSE PRACTITIONER

## 2021-03-10 PROCEDURE — 80048 BASIC METABOLIC PNL TOTAL CA: CPT

## 2021-03-10 PROCEDURE — 97530 THERAPEUTIC ACTIVITIES: CPT

## 2021-03-10 PROCEDURE — 97165 OT EVAL LOW COMPLEX 30 MIN: CPT

## 2021-03-10 PROCEDURE — 6360000002 HC RX W HCPCS: Performed by: NURSE PRACTITIONER

## 2021-03-10 PROCEDURE — 85025 COMPLETE CBC W/AUTO DIFF WBC: CPT

## 2021-03-10 PROCEDURE — 2700000000 HC OXYGEN THERAPY PER DAY

## 2021-03-10 PROCEDURE — 1200000000 HC SEMI PRIVATE

## 2021-03-10 RX ORDER — VALSARTAN 80 MG/1
40 TABLET ORAL DAILY
Status: DISCONTINUED | OUTPATIENT
Start: 2021-03-10 | End: 2021-03-14 | Stop reason: HOSPADM

## 2021-03-10 RX ORDER — SPIRONOLACTONE 25 MG/1
25 TABLET ORAL DAILY
Status: DISCONTINUED | OUTPATIENT
Start: 2021-03-10 | End: 2021-03-14 | Stop reason: HOSPADM

## 2021-03-10 RX ORDER — GUAIFENESIN 600 MG/1
600 TABLET, EXTENDED RELEASE ORAL 2 TIMES DAILY
Status: DISCONTINUED | OUTPATIENT
Start: 2021-03-10 | End: 2021-03-14 | Stop reason: HOSPADM

## 2021-03-10 RX ADMIN — LEVOTHYROXINE SODIUM 100 MCG: 0.1 TABLET ORAL at 08:21

## 2021-03-10 RX ADMIN — CEFTRIAXONE SODIUM 1000 MG: 1 INJECTION, POWDER, FOR SOLUTION INTRAMUSCULAR; INTRAVENOUS at 15:16

## 2021-03-10 RX ADMIN — APIXABAN 2.5 MG: 2.5 TABLET, FILM COATED ORAL at 08:21

## 2021-03-10 RX ADMIN — DOCUSATE SODIUM 100 MG: 100 CAPSULE, LIQUID FILLED ORAL at 08:21

## 2021-03-10 RX ADMIN — ATORVASTATIN CALCIUM 20 MG: 10 TABLET, FILM COATED ORAL at 08:21

## 2021-03-10 RX ADMIN — APIXABAN 5 MG: 5 TABLET, FILM COATED ORAL at 20:42

## 2021-03-10 RX ADMIN — Medication 10 ML: at 20:43

## 2021-03-10 RX ADMIN — ACETAMINOPHEN 650 MG: 325 TABLET ORAL at 08:21

## 2021-03-10 RX ADMIN — METOPROLOL SUCCINATE 50 MG: 50 TABLET, EXTENDED RELEASE ORAL at 17:24

## 2021-03-10 RX ADMIN — Medication 10 ML: at 08:24

## 2021-03-10 RX ADMIN — SPIRONOLACTONE 25 MG: 25 TABLET ORAL at 11:55

## 2021-03-10 RX ADMIN — METOPROLOL SUCCINATE 50 MG: 50 TABLET, EXTENDED RELEASE ORAL at 08:21

## 2021-03-10 RX ADMIN — GUAIFENESIN 600 MG: 600 TABLET, EXTENDED RELEASE ORAL at 20:42

## 2021-03-10 RX ADMIN — ASPIRIN 81 MG: 81 TABLET, CHEWABLE ORAL at 08:21

## 2021-03-10 RX ADMIN — DOCUSATE SODIUM 100 MG: 100 CAPSULE, LIQUID FILLED ORAL at 20:42

## 2021-03-10 RX ADMIN — VALSARTAN 40 MG: 80 TABLET, FILM COATED ORAL at 13:01

## 2021-03-10 RX ADMIN — GUAIFENESIN 600 MG: 600 TABLET, EXTENDED RELEASE ORAL at 11:55

## 2021-03-10 RX ADMIN — AZITHROMYCIN MONOHYDRATE 500 MG: 500 INJECTION, POWDER, LYOPHILIZED, FOR SOLUTION INTRAVENOUS at 16:19

## 2021-03-10 ASSESSMENT — PAIN SCALES - GENERAL
PAINLEVEL_OUTOF10: 0
PAINLEVEL_OUTOF10: 0
PAINLEVEL_OUTOF10: 7
PAINLEVEL_OUTOF10: 7

## 2021-03-10 ASSESSMENT — PAIN DESCRIPTION - PAIN TYPE: TYPE: ACUTE PAIN

## 2021-03-10 ASSESSMENT — PAIN DESCRIPTION - ORIENTATION: ORIENTATION: MID

## 2021-03-10 ASSESSMENT — PAIN DESCRIPTION - ONSET: ONSET: PROGRESSIVE

## 2021-03-10 ASSESSMENT — PAIN DESCRIPTION - DESCRIPTORS: DESCRIPTORS: HEADACHE

## 2021-03-10 ASSESSMENT — PAIN DESCRIPTION - LOCATION: LOCATION: HEAD

## 2021-03-10 ASSESSMENT — PAIN DESCRIPTION - FREQUENCY: FREQUENCY: INTERMITTENT

## 2021-03-10 NOTE — CARE COORDINATION
CASE MANAGEMENT INITIAL ASSESSMENT      Reviewed chart and completed assessment via telephone with: Pt  Explained Case Management role/services. Primary contact information: Pt's daughter Brad Jay :   Primary Decision Maker: Raul Steele - [phone]        Admit date/status: 3/9/21   Diagnosis: ARF   Is this a Readmission?: No       Insurance: [facility] Medicare   Precert required for SNF:  Y       3 night stay required: N    Living arrangements, Adls, care needs, prior to admission: Pt lives alone in an second floor apartment with an elevator to get to the apartment. Pt states she is  independent of ADL'S       Transportation:Family/ No preference     Durable Medical Equipment at home:  Walker_X_Cane_X_RTS__ BSC__Shower Chair__  02__ HHN__ CPAP__  BiPap__  Hospital Bed__ W/C___ Other__________    Services in the home and/or outpatient, prior to admission: None currently     Dialysis Facility (if applicable) N/A   · Name:  · Address:  · Dialysis Schedule:  · Phone:  · Fax:    PT/OT recs: [facility] Exemption Notification (HEN): N/A     Barriers to discharge:None     Plan/comments: 3/10/21 Pt lives at home alone and her plan is to return on d/c. Per pt's request a referral was made to [facility]. Watch for possible new home o2 needs.      ECOC on chart for MD signature

## 2021-03-10 NOTE — PROGRESS NOTES
Patient is awake, alert and oriented x4. Assessment is complete, see flow sheet. Bed in lowest position, wheels locked, call light is within reach. Patient denies any further needs at the moment. Will continue to monitor.   Vitals:    03/10/21 0817   BP: 129/85   Pulse: 90   Resp: 18   Temp: 98.2 °F (36.8 °C)   SpO2: 91%     Headache 7/10 - PRN tylenol given  On 4L increased to 5L  HR jumping from

## 2021-03-10 NOTE — PROGRESS NOTES
Tonsillectomy; Colonoscopy (4/29/2013); Breast surgery; Cardiac surgery (2007); joint replacement (Left, 8/13/14); other surgical history (Right, 1/6/14); and Cataract removal with implant (Right, 10/11/2017). Restrictions  Restrictions/Precautions  Restrictions/Precautions: General Precautions, Up as Tolerated, Fall Risk    Subjective   General  Chart Reviewed: Yes  Patient assessed for rehabilitation services?: Yes  Family / Caregiver Present: No  Referring Practitioner: Jostin Grandchild, APRN - CNP  Diagnosis: Acute hypoxic respiratory failure, PNA, PAF, Chronic combined CHF  Subjective  Subjective: Pt in bed on arrival, pleasant and agreeable to eval and treat  General Comment  Comments: Per RN okay to treat  Patient Currently in Pain: Denies  Vital Signs  Patient Currently in Pain: Denies  Social/Functional History  Social/Functional History  Lives With: Alone  Type of Home: Apartment(Independent Living)  Home Layout: One level  Home Access: Level entry, Elevator(Second floor apartment)  Bathroom Shower/Tub: Walk-in shower  Bathroom Toilet: Handicap height  Bathroom Equipment: Shower chair, Grab bars in [address] [place]: 4 wheeled walker  Receives Help From: Family  Homemaking Assistance: Needs assistance(Meals on Wheels; daughter does grocery shopping)  Homemaking Responsibilities: Yes  Ambulation Assistance: Independent(with 1VS.  Able to ambulate community distances)  Transfer Assistance: Independent  Active : No  Patient's  Info: Daughter drives or has bus service through senior living facility  Occupation: Retired  Type of occupation: worked at [address] [place] [address],[employer]: Socializes with neighbors       Objective        Orientation  Overall Orientation Status: Within Functional Limits     Balance  Sitting Balance: Supervision  Standing Balance: Stand by assistance  Standing Balance  Time: >4 minutes, 2-3 minutes  Activity: mobility  Functional Mobility  Functional - Mobility Time In 1115(10 minutes for eval)         Time Out 1149         Minutes 34         Timed Code Treatment Minutes: 24 Minutes     This note to serve as discharge summary should pt discharge prior to next session.     Chyna Craig, OTR/L

## 2021-03-10 NOTE — ACP (ADVANCE CARE PLANNING)
Advance Care Planning Note    Name: James Vogt  YOB: 1930  MRN: [id number]  Admission Date: 3/9/2021  9:45 AM    Date of Discussion: 3/10/2021    Active Diagnoses:    Pneumonia  Acute hypoxic respiratory failure      These active diagnoses are of sufficient risk that focused discussion on advance care planning is indicated in order to allow the patient to thoughtfully consider personal goals of care; and, if situations arise that prevent the ability to personally give input, to ensure appropriate representation of their personal desires for different levels and agressiveness of care. Discussion:    Persons present and participating in discussion: James Vogt, Farhat Guy, daughter, Leeann Bearden. Patient's decisional capacity or surrogate:  Decisional    Discussion in summary: I discussed code status with the patient and she wishes to be a limited code. She does NOT wish for CPR, intubation, defibrillation, medications. States if the Tejindervanesa Rowan is ready for her, then let her go. These are reasonable wishes given her advanced age. Her daughter Leeann Bearden is reluctant to agree to this. She states the patient doesn't want to be a \"vegetable\" but if there is \"hope\" then to give her a chance. I discussed this with Resi and she is still insistent she would NOT want heroic measures. Code status amended per patient's request.      Code status: Limited x 4    Time Spent: 6999 - 6685    Total time spent face-to-face in education and discussion: 16 minutes.     Electronically signed by ROBERT Contreras CNP on 3/10/2021 at 12:21 PM

## 2021-03-10 NOTE — PROGRESS NOTES
Paged CNP Andres;  3/10/21 2:45 PM   [phone] Hospital or Facility: [facility] From: Hilario Charles RE: Linda Griffith 08/25/1930 RM: 26 FYI Pt is not feeling well, complaining of her heart racing and making her dizzy. I noticed her rate was around 100. Please advise, thank you!

## 2021-03-10 NOTE — PROGRESS NOTES
Hospitalist Progress Note      PCP: Bria Dillon MD    Date of Admission: 3/9/2021    Chief Complaint: Cough and SOB    Hospital Course:  80 y.o. female, with a PMH of CAD, CHF, HTN, and HLD, who presented to [facility] with cough and SOB. The patient complains of having a fever, cough, body aches, and headache for the previous 3 days. She had her 2nd COVID vaccine last on 2/11. She was very worried that perhaps, she did have COVID, so she decided to seek treatment. She denies abdominal pain, nausea, vomiting.       Subjective: Feels a bit better today. O2 requirement at 4L. Discussed plan of care with daughter via telephone. Medications:  Reviewed    Infusion Medications   Scheduled Medications    guaiFENesin  600 mg Oral BID    apixaban  5 mg Oral BID    spironolactone  25 mg Oral Daily    allopurinol  100 mg Oral Daily    aspirin  81 mg Oral Daily    atorvastatin  20 mg Oral Daily    docusate sodium  100 mg Oral BID    levothyroxine  100 mcg Oral Daily    metoprolol succinate  50 mg Oral BID    sodium chloride flush  10 mL Intravenous 2 times per day    azithromycin  500 mg Intravenous Q24H    cefTRIAXone (ROCEPHIN) IV  1,000 mg Intravenous Q24H     PRN Meds: sodium chloride flush, promethazine **OR** ondansetron, polyethylene glycol, acetaminophen **OR** acetaminophen      Intake/Output Summary (Last 24 hours) at 3/10/2021 1215  Last data filed at 3/10/2021 0855  Gross per 24 hour   Intake 610 ml   Output 800 ml   Net -190 ml       Physical Exam Performed:    /80   Pulse 91   Temp 98.2 °F (36.8 °C) (Oral)   Resp 12   Wt 181 lb (82.1 kg)   SpO2 96%   BMI 32.58 kg/m²     General appearance: No apparent distress, appears stated age and cooperative. HEENT: Pupils equal, round, and reactive to light. Conjunctivae/corneas clear. Neck: Supple, with full range of motion. No jugular venous distention. Trachea midline. Respiratory:  Normal respiratory effort.  Clear to auscultation, bilaterally without Rales/Wheezes/Rhonchi. On 4L. Cardiovascular: Regular rate and rhythm with normal S1/S2 without murmurs, rubs or gallops. Abdomen: Soft, non-tender, non-distended with normal bowel sounds. Musculoskeletal: No clubbing, cyanosis or edema bilaterally. Full range of motion without deformity. Skin: Skin color, texture, turgor normal.  No rashes or lesions. Neurologic:  Neurovascularly intact without any focal sensory/motor deficits. Cranial nerves: II-XII intact, grossly non-focal.  Psychiatric: Alert and oriented, thought content appropriate, normal insight  Capillary Refill: Brisk,< 3 seconds   Peripheral Pulses: +2 palpable, equal bilaterally       Labs:   Recent Labs     03/09/21  0953 03/10/21  0736   WBC 7.3 6.2   HGB 10.9* 10.8*   HCT 33.0* 33.1*    151     Recent Labs     03/09/21  0953 03/10/21  0736   * 139   K 3.9 4.2    106   CO2 20* 24   BUN 31* 29*   CREATININE 1.3* 1.1   CALCIUM 9.7 9.4     Recent Labs     03/09/21  0953   AST 15   ALT 11   BILITOT 0.6   ALKPHOS 64     No results for input(s): INR in the last 72 hours. Recent Labs     03/09/21  0953   TROPONINI <0.01       Urinalysis:      Lab Results   Component Value Date    NITRU Negative 06/22/2019    WBCUA 6-10 03/06/2019    BACTERIA Rare 03/06/2019    RBCUA 0-2 03/06/2019    BLOODU Negative 06/22/2019    SPECGRAV <=1.005 06/22/2019    GLUCOSEU Negative 06/22/2019       Radiology:  XR CHEST PORTABLE   Final Result   Right basilar opacity, pneumonia versus atelectasis. Radiographic follow-up   is recommended. Mild cardiac silhouette enlargement. Assessment/Plan:    Active Hospital Problems    Diagnosis    Acute respiratory failure with hypoxia ([address] [J96.01]     Acute hypoxic respiratory failure - likely due to CAP. - supplemental O2 to keep sats > 92%. - wean as tolerated.     Pneumonia, RLL, likely CAP. - patient is NOT from a nursing home.   - CXR showed RLL opacity. Complains of cough, SOB, fever, malaise.  - rapid COVID negative and has been fully vaccinated. - de-escalate abx to Azithro and Rocephin.  - follow BMP, CBC.     PAF - currently in NSR.  - continue home Eliquis, Toprol.     Chronic combined CHF - stable without exacerbation.  - last TTE 2019 - EF 45% with grade II DD.  - Continue home BB, ARB, and aldactone. - daily weights, I&O     CAD - stable. Continue home ASA, statin, BB, ARB.     TSH - continue levothyroxine.       UTI prophylaxis therapy - hold home mendelamine while on abx.       DVT Prophylaxis: Eliquis  Diet: DIET GENERAL;  Code Status: DNR-CC - see ACP note    PT/OT Eval Status: ordered    Dispo - likely another 1-2 days.       ROBERT Davis - CNP

## 2021-03-10 NOTE — PROGRESS NOTES
worked at [address] [place] [address],[facility], [id number]: Socializes with neighbors    Objective  RLE AROM: WFL  LLE AROM : WFL  Strength RLE: WFL  Strength LLE: WFL     Sensation  Overall Sensation Status: WFL     Bed mobility  Supine to Sit: Minimal assistance(HOB elevated, use of bed rail)  Sit to Supine: Unable to assess(pt up in chair at end of session)  Scooting: Stand by assistance(forward to EOB)     Transfers  Sit to Stand: Contact guard assistance  Stand to sit: Stand by assistance     Ambulation  Ambulation?: Yes  Ambulation 1  Surface: level tile  Device: Rolling Walker  Other Apparatus: O2  Assistance: Stand by assistance;Contact guard assistance  Quality of Gait: Cues for posture and increased step length. no LOB  Gait Deviations: Decreased step length;Shuffles;Decreased step height  Distance: 120 ft     Balance  Sitting - Static: Good  Sitting - Dynamic: Good  Standing - Static: Good;-  Standing - Dynamic: Fair;+        Plan   Plan  Times per week: 3-5x/wk  Times per day: Daily  Specific instructions for Next Treatment: progress mobility as tolerated  Current Treatment Recommendations: Strengthening, Neuromuscular Re-education, Home Exercise Program, Safety Education & Training, Balance Training, Endurance Training, Functional Mobility Training, Transfer Training, Gait Training, Equipment Evaluation, Education, & procurement, Patient/Caregiver Education & Training  Safety Devices  Type of devices:  All fall risk precautions in place, Call light within reach, Chair alarm in place, Gait belt, Patient at risk for falls, Nurse notified, Left in chair                                   AM-PAC Score     AM-PAC Inpatient Mobility without Stair Climbing Raw Score : 16 (03/10/21 1419)  AM-PAC Inpatient without Stair Climbing T-Scale Score : 45.54 (03/10/21 1419)  Mobility Inpatient CMS 0-100% Score: 40.64 (03/10/21 1419)  Mobility Inpatient without Stair CMS G-Code Modifier : CK (03/10/21 1419)       Goals  Short term goals  Time Frame for Short term goals: 1 week (3/17) unless otherwise specified  Short term goal 1: Pt will be mod I with bed mobility. Short term goal 2: Pt will be mod I with transfers with RW. Short term goal 3: Pt will ambulate 150 ft mod I with RW. Short term goal 4: 3/14: Pt will participate in 12-15 reps of BLE exercises to promote strength and activity tolerance. Patient Goals   Patient goals : \"to go home\"       Therapy Time   Individual Concurrent Group Co-treatment   Time In 1115         Time Out 1149         Minutes 34         Timed Code Treatment Minutes: Art Martin [id number], PT, DPT  If pt is unable to be seen after this session, please let this note serve as discharge summary. Please see case management note for discharge disposition. Thank you.

## 2021-03-11 LAB
ANION GAP SERPL CALCULATED.3IONS-SCNC: 8 MMOL/L (ref 3–16)
BASOPHILS ABSOLUTE: 0 K/UL (ref 0–0.2)
BASOPHILS RELATIVE PERCENT: 0.4 %
BUN BLDV-MCNC: 31 MG/DL (ref 7–20)
CALCIUM SERPL-MCNC: 9.4 MG/DL (ref 8.3–10.6)
CHLORIDE BLD-SCNC: 109 MMOL/L (ref 99–110)
CO2: 23 MMOL/L (ref 21–32)
CREAT SERPL-MCNC: 1.1 MG/DL (ref 0.6–1.2)
EOSINOPHILS ABSOLUTE: 0.5 K/UL (ref 0–0.6)
EOSINOPHILS RELATIVE PERCENT: 7.6 %
GFR AFRICAN AMERICAN: 56
GFR NON-AFRICAN AMERICAN: 47
GLUCOSE BLD-MCNC: 100 MG/DL (ref 70–99)
HCT VFR BLD CALC: 33.9 % (ref 36–48)
HEMOGLOBIN: 11.2 G/DL (ref 12–16)
LYMPHOCYTES ABSOLUTE: 1.7 K/UL (ref 1–5.1)
LYMPHOCYTES RELATIVE PERCENT: 25.6 %
MCH RBC QN AUTO: 30.7 PG (ref 26–34)
MCHC RBC AUTO-ENTMCNC: 33.2 G/DL (ref 31–36)
MCV RBC AUTO: 92.6 FL (ref 80–100)
MONOCYTES ABSOLUTE: 0.6 K/UL (ref 0–1.3)
MONOCYTES RELATIVE PERCENT: 9.7 %
NEUTROPHILS ABSOLUTE: 3.7 K/UL (ref 1.7–7.7)
NEUTROPHILS RELATIVE PERCENT: 56.7 %
PDW BLD-RTO: 14.6 % (ref 12.4–15.4)
PLATELET # BLD: 153 K/UL (ref 135–450)
PMV BLD AUTO: 9.6 FL (ref 5–10.5)
POTASSIUM SERPL-SCNC: 4.3 MMOL/L (ref 3.5–5.1)
RBC # BLD: 3.66 M/UL (ref 4–5.2)
SODIUM BLD-SCNC: 140 MMOL/L (ref 136–145)
TSH SERPL DL<=0.05 MIU/L-ACNC: 0.75 UIU/ML (ref 0.27–4.2)
WBC # BLD: 6.5 K/UL (ref 4–11)

## 2021-03-11 PROCEDURE — 6370000000 HC RX 637 (ALT 250 FOR IP): Performed by: NURSE PRACTITIONER

## 2021-03-11 PROCEDURE — 2580000003 HC RX 258: Performed by: NURSE PRACTITIONER

## 2021-03-11 PROCEDURE — 6360000002 HC RX W HCPCS: Performed by: NURSE PRACTITIONER

## 2021-03-11 PROCEDURE — 85025 COMPLETE CBC W/AUTO DIFF WBC: CPT

## 2021-03-11 PROCEDURE — 94761 N-INVAS EAR/PLS OXIMETRY MLT: CPT

## 2021-03-11 PROCEDURE — 84443 ASSAY THYROID STIM HORMONE: CPT

## 2021-03-11 PROCEDURE — 80048 BASIC METABOLIC PNL TOTAL CA: CPT

## 2021-03-11 PROCEDURE — 2700000000 HC OXYGEN THERAPY PER DAY

## 2021-03-11 PROCEDURE — 1200000000 HC SEMI PRIVATE

## 2021-03-11 PROCEDURE — 97530 THERAPEUTIC ACTIVITIES: CPT

## 2021-03-11 PROCEDURE — 36415 COLL VENOUS BLD VENIPUNCTURE: CPT

## 2021-03-11 PROCEDURE — 97535 SELF CARE MNGMENT TRAINING: CPT

## 2021-03-11 RX ORDER — LEVOTHYROXINE SODIUM 0.1 MG/1
100 TABLET ORAL DAILY
Status: DISCONTINUED | OUTPATIENT
Start: 2021-03-12 | End: 2021-03-14 | Stop reason: HOSPADM

## 2021-03-11 RX ADMIN — ATORVASTATIN CALCIUM 20 MG: 10 TABLET, FILM COATED ORAL at 08:30

## 2021-03-11 RX ADMIN — GUAIFENESIN 600 MG: 600 TABLET, EXTENDED RELEASE ORAL at 08:29

## 2021-03-11 RX ADMIN — METOPROLOL SUCCINATE 50 MG: 50 TABLET, EXTENDED RELEASE ORAL at 18:21

## 2021-03-11 RX ADMIN — CEFTRIAXONE SODIUM 1000 MG: 1 INJECTION, POWDER, FOR SOLUTION INTRAMUSCULAR; INTRAVENOUS at 15:32

## 2021-03-11 RX ADMIN — SPIRONOLACTONE 25 MG: 25 TABLET ORAL at 08:29

## 2021-03-11 RX ADMIN — ASPIRIN 81 MG: 81 TABLET, CHEWABLE ORAL at 08:30

## 2021-03-11 RX ADMIN — GUAIFENESIN 600 MG: 600 TABLET, EXTENDED RELEASE ORAL at 22:02

## 2021-03-11 RX ADMIN — LEVOTHYROXINE SODIUM 100 MCG: 0.1 TABLET ORAL at 08:30

## 2021-03-11 RX ADMIN — Medication 10 ML: at 22:02

## 2021-03-11 RX ADMIN — METOPROLOL SUCCINATE 50 MG: 50 TABLET, EXTENDED RELEASE ORAL at 08:29

## 2021-03-11 RX ADMIN — VALSARTAN 40 MG: 80 TABLET, FILM COATED ORAL at 08:29

## 2021-03-11 RX ADMIN — AZITHROMYCIN MONOHYDRATE 500 MG: 500 INJECTION, POWDER, LYOPHILIZED, FOR SOLUTION INTRAVENOUS at 16:10

## 2021-03-11 RX ADMIN — DOCUSATE SODIUM 100 MG: 100 CAPSULE, LIQUID FILLED ORAL at 22:02

## 2021-03-11 RX ADMIN — APIXABAN 5 MG: 5 TABLET, FILM COATED ORAL at 08:30

## 2021-03-11 RX ADMIN — DOCUSATE SODIUM 100 MG: 100 CAPSULE, LIQUID FILLED ORAL at 08:30

## 2021-03-11 RX ADMIN — APIXABAN 5 MG: 5 TABLET, FILM COATED ORAL at 22:02

## 2021-03-11 RX ADMIN — Medication 10 ML: at 08:32

## 2021-03-11 ASSESSMENT — PAIN SCALES - GENERAL: PAINLEVEL_OUTOF10: 0

## 2021-03-11 NOTE — PROGRESS NOTES
End of shift report given to oJdy Simms [facility]. Bed in lowest position with wheels locked. Call light within reach.  No further needs at this time. Elpidio Padron

## 2021-03-11 NOTE — PROGRESS NOTES
Occupational Therapy  Facility/Department: [facility] A2 CARD TELEMETRY  Daily Treatment Note  NAME: Marten Romberg  : 1930  MRN: [id number]    Date of Service: 3/11/2021    Discharge Recommendations:  Home with assist PRN, Home with Home health OT  OT Equipment Recommendations  Equipment Needed: No    Assessment   Performance deficits / Impairments: Decreased functional mobility ; Decreased high-level IADLs;Decreased ADL status; Decreased endurance;Decreased balance  Assessment: Pt continues to be very pleasant and agreeable to treatment, requiring increased time to complete tasks, but d/t distracted by conversation throughout session. Pt presents with improving level of assistance for functional transfers/mobility, reporting feeling better today overall. Pt continues to express no OT concerns for d/c, reporting has support as needed from family and friends/neighbors. Continue OT tx.  OT Education: Plan of Care;OT Role;Precautions;Transfer Training;ADL Adaptive Strategies; Equipment; Energy Conservation;Orientation  Patient Education: importance of mobility and ROM  REQUIRES OT FOLLOW UP: Yes  Activity Tolerance  Activity Tolerance: Patient Tolerated treatment well  Activity Tolerance: BP 100s/70s, HR 84, Spo2 96% on 3L  Safety Devices  Safety Devices in place: Yes  Type of devices: Nurse notified;Call light within reach;Gait belt;Left in bed(no alarm on arrival, okay per RN)         Patient Diagnosis(es): The primary encounter diagnosis was Pneumonia due to organism. A diagnosis of Acute hypoxemic respiratory failure (HCC) was also pertinent to this visit. has a past medical history of Arthritis, CAD (coronary artery disease), CHF (congestive heart failure) ([facility] [address] Depression, Diphtheria, Gout, Hyperlipemia, Hypertension, STEMI (ST elevation myocardial infarction) ([facility] [address] Thyroid disease, and Wears hearing aid. has a past surgical history that includes Hysterectomy; Appendectomy;  Tonsillectomy; Colonoscopy (4/29/2013); Breast surgery; Cardiac surgery (2007); joint replacement (Left, 8/13/14); other surgical history (Right, 1/6/14); and Cataract removal with implant (Right, 10/11/2017).     Restrictions  Restrictions/Precautions  Restrictions/Precautions: General Precautions, Up as Tolerated, Fall Risk  Subjective   General  Chart Reviewed: Yes  Patient assessed for rehabilitation services?: Yes  Family / Caregiver Present: No  Referring Practitioner: ROBERT Portillo CNP  Diagnosis: Acute hypoxic respiratory failure, PNA, PAF, Chronic combined CHF  Subjective  Subjective: Pt seated at EOB finishing breakfast on arrival, pleasant and agreeable to treatment  General Comment  Comments: Per RN okay to treat  Vital Signs  Patient Currently in Pain: Denies   Orientation  Orientation  Overall Orientation Status: Within Functional Limits  Objective    ADL  Feeding: Independent  Grooming: Setup;Supervision  LE Dressing: Minimal assistance;Setup        Balance  Sitting Balance: Modified independent   Standing Balance: Supervision  Standing Balance  Time: >3 minutes, 1-2 minutes  Activity: standing ADL, mobility, transfers  Functional Mobility  Functional - Mobility Device: Rolling Walker  Activity: Other(in-room only)  Assist Level: Stand by assistance  Bed mobility  Supine to Sit: Unable to assess  Sit to Supine: Unable to assess  Comment: Pt seated EOB on arrival and exit  Transfers  Sit to stand: Stand by assistance  Stand to sit: Supervision;Stand by assistance        Cognition  Overall Cognitive Status: WFL  Cognition Comment: Some cues to redirect d/t distracted by conversation         Plan   Plan  Times per week: 3-5x/week  Current Treatment Recommendations: Strengthening, Balance Training, Functional Mobility Training, Endurance Training, Equipment Evaluation, Education, & procurement, Patient/Caregiver Education & Training, Safety Education & Training, Gait Training, Self-Care / ADL, Home Management Training AM-PAC Score        AM-PAC Inpatient Daily Activity Raw Score: 19 (03/11/21 1026)  AM-PAC Inpatient ADL T-Scale Score : 40.22 (03/11/21 1026)  ADL Inpatient CMS 0-100% Score: 42.8 (03/11/21 1026)  ADL Inpatient CMS G-Code Modifier : CK (03/11/21 1026)    Goals  Short term goals  Time Frame for Short term goals: 1 week (by 3/17/21)  Short term goal 1: Pt will complete functional transfers with Mod I  Short term goal 2: Pt will complete LE dressing with setup by 3/15  Short term goal 3: Pt will perform 3 minutes dynamic standing activity with Supervision - GOAL MET 3/11  Patient Goals   Patient goals : \"to go home\"       Therapy Time   Individual Concurrent Group Co-treatment   Time In 0852         Time Out 0945         Minutes 53         Timed Code Treatment Minutes: 48 Minutes     This note to serve as discharge summary should pt discharge prior to next session.     Shira Reyes OTR/L

## 2021-03-12 LAB
ANION GAP SERPL CALCULATED.3IONS-SCNC: 8 MMOL/L (ref 3–16)
BILIRUBIN URINE: NEGATIVE
BLOOD, URINE: NEGATIVE
BUN BLDV-MCNC: 35 MG/DL (ref 7–20)
CALCIUM SERPL-MCNC: 9.7 MG/DL (ref 8.3–10.6)
CHLORIDE BLD-SCNC: 108 MMOL/L (ref 99–110)
CLARITY: CLEAR
CO2: 25 MMOL/L (ref 21–32)
COLOR: YELLOW
CREAT SERPL-MCNC: 1.2 MG/DL (ref 0.6–1.2)
EPITHELIAL CELLS, UA: ABNORMAL /HPF (ref 0–5)
GFR AFRICAN AMERICAN: 51
GFR NON-AFRICAN AMERICAN: 42
GLUCOSE BLD-MCNC: 106 MG/DL (ref 70–99)
GLUCOSE URINE: NEGATIVE MG/DL
KETONES, URINE: NEGATIVE MG/DL
LEUKOCYTE ESTERASE, URINE: ABNORMAL
MICROSCOPIC EXAMINATION: YES
NITRITE, URINE: NEGATIVE
PH UA: 6 (ref 5–8)
POTASSIUM SERPL-SCNC: 4.8 MMOL/L (ref 3.5–5.1)
PRO-BNP: 4790 PG/ML (ref 0–449)
PROTEIN UA: NEGATIVE MG/DL
RBC UA: ABNORMAL /HPF (ref 0–4)
SODIUM BLD-SCNC: 141 MMOL/L (ref 136–145)
SPECIFIC GRAVITY UA: 1.01 (ref 1–1.03)
URINE TYPE: ABNORMAL
UROBILINOGEN, URINE: 0.2 E.U./DL
WBC UA: ABNORMAL /HPF (ref 0–5)

## 2021-03-12 PROCEDURE — 6370000000 HC RX 637 (ALT 250 FOR IP): Performed by: NURSE PRACTITIONER

## 2021-03-12 PROCEDURE — 81001 URINALYSIS AUTO W/SCOPE: CPT

## 2021-03-12 PROCEDURE — 2700000000 HC OXYGEN THERAPY PER DAY

## 2021-03-12 PROCEDURE — 2580000003 HC RX 258: Performed by: NURSE PRACTITIONER

## 2021-03-12 PROCEDURE — 97530 THERAPEUTIC ACTIVITIES: CPT

## 2021-03-12 PROCEDURE — 97535 SELF CARE MNGMENT TRAINING: CPT

## 2021-03-12 PROCEDURE — 6360000002 HC RX W HCPCS: Performed by: NURSE PRACTITIONER

## 2021-03-12 PROCEDURE — 1200000000 HC SEMI PRIVATE

## 2021-03-12 PROCEDURE — 97116 GAIT TRAINING THERAPY: CPT

## 2021-03-12 PROCEDURE — 36415 COLL VENOUS BLD VENIPUNCTURE: CPT

## 2021-03-12 PROCEDURE — 83880 ASSAY OF NATRIURETIC PEPTIDE: CPT

## 2021-03-12 PROCEDURE — 94761 N-INVAS EAR/PLS OXIMETRY MLT: CPT

## 2021-03-12 PROCEDURE — 80048 BASIC METABOLIC PNL TOTAL CA: CPT

## 2021-03-12 PROCEDURE — 97110 THERAPEUTIC EXERCISES: CPT

## 2021-03-12 RX ADMIN — AZITHROMYCIN MONOHYDRATE 500 MG: 500 INJECTION, POWDER, LYOPHILIZED, FOR SOLUTION INTRAVENOUS at 17:03

## 2021-03-12 RX ADMIN — VALSARTAN 40 MG: 80 TABLET, FILM COATED ORAL at 09:25

## 2021-03-12 RX ADMIN — Medication 10 ML: at 22:12

## 2021-03-12 RX ADMIN — SPIRONOLACTONE 25 MG: 25 TABLET ORAL at 09:25

## 2021-03-12 RX ADMIN — ASPIRIN 81 MG: 81 TABLET, CHEWABLE ORAL at 09:24

## 2021-03-12 RX ADMIN — METOPROLOL SUCCINATE 50 MG: 50 TABLET, EXTENDED RELEASE ORAL at 17:02

## 2021-03-12 RX ADMIN — GUAIFENESIN 600 MG: 600 TABLET, EXTENDED RELEASE ORAL at 09:24

## 2021-03-12 RX ADMIN — DOCUSATE SODIUM 100 MG: 100 CAPSULE, LIQUID FILLED ORAL at 09:26

## 2021-03-12 RX ADMIN — CEFTRIAXONE SODIUM 1000 MG: 1 INJECTION, POWDER, FOR SOLUTION INTRAMUSCULAR; INTRAVENOUS at 15:03

## 2021-03-12 RX ADMIN — ALLOPURINOL 100 MG: 100 TABLET ORAL at 09:26

## 2021-03-12 RX ADMIN — APIXABAN 5 MG: 5 TABLET, FILM COATED ORAL at 09:26

## 2021-03-12 RX ADMIN — Medication 10 ML: at 09:27

## 2021-03-12 RX ADMIN — LEVOTHYROXINE SODIUM 100 MCG: 0.1 TABLET ORAL at 06:02

## 2021-03-12 RX ADMIN — APIXABAN 5 MG: 5 TABLET, FILM COATED ORAL at 22:11

## 2021-03-12 RX ADMIN — GUAIFENESIN 600 MG: 600 TABLET, EXTENDED RELEASE ORAL at 22:11

## 2021-03-12 RX ADMIN — METOPROLOL SUCCINATE 50 MG: 50 TABLET, EXTENDED RELEASE ORAL at 08:17

## 2021-03-12 RX ADMIN — ATORVASTATIN CALCIUM 20 MG: 10 TABLET, FILM COATED ORAL at 09:26

## 2021-03-12 ASSESSMENT — PAIN SCALES - GENERAL: PAINLEVEL_OUTOF10: 0

## 2021-03-12 NOTE — PROGRESS NOTES
Hospitalist Progress Note      PCP: Sabrina Bernard MD    Date of Admission: 3/9/2021    Chief Complaint: Cough and SOB    Hospital Course:  80 y.o. female, with a PMH of CAD, CHF, HTN, and HLD, who presented to [facility] with cough and SOB. The patient complains of having a fever, cough, body aches, and headache for the previous 3 days. She had her 2nd COVID vaccine last on 2/11. She was very worried that perhaps, she did have COVID, so she decided to seek treatment. She denies abdominal pain, nausea, vomiting.       Subjective: OOB to chair. On 3L O2. Feels weak. Medications:  Reviewed    Infusion Medications   Scheduled Medications    [START ON 3/12/2021] levothyroxine  100 mcg Oral Daily    [START ON 3/12/2021] linaclotide  145 mcg Oral QAM AC    guaiFENesin  600 mg Oral BID    apixaban  5 mg Oral BID    spironolactone  25 mg Oral Daily    valsartan  40 mg Oral Daily    allopurinol  100 mg Oral Daily    aspirin  81 mg Oral Daily    atorvastatin  20 mg Oral Daily    docusate sodium  100 mg Oral BID    metoprolol succinate  50 mg Oral BID    sodium chloride flush  10 mL Intravenous 2 times per day    azithromycin  500 mg Intravenous Q24H    cefTRIAXone (ROCEPHIN) IV  1,000 mg Intravenous Q24H     PRN Meds: sodium chloride flush, promethazine **OR** ondansetron, polyethylene glycol, acetaminophen **OR** acetaminophen      Intake/Output Summary (Last 24 hours) at 3/11/2021 1922  Last data filed at 3/11/2021 1753  Gross per 24 hour   Intake 250 ml   Output    Net 250 ml       Physical Exam Performed:    BP (!) 115/56   Pulse 104   Temp 97.5 °F (36.4 °C) (Oral)   Resp 18   Wt 181 lb (82.1 kg)   SpO2 97%   BMI 32.58 kg/m²     General appearance: No apparent distress, appears stated age and cooperative. HEENT: Pupils equal, round, and reactive to light. Conjunctivae/corneas clear. Neck: Supple, with full range of motion. No jugular venous distention.  Trachea midline. Respiratory:  Normal respiratory effort. Clear to auscultation, bilaterally without Rales/Wheezes/Rhonchi. On 3L. Cardiovascular: Regular rate and rhythm with normal S1/S2 without murmurs, rubs or gallops. Abdomen: Soft, non-tender, non-distended with normal bowel sounds. Musculoskeletal: No clubbing, cyanosis or edema bilaterally. Full range of motion without deformity. Skin: Skin color, texture, turgor normal.  No rashes or lesions. Neurologic:  Neurovascularly intact without any focal sensory/motor deficits. Cranial nerves: II-XII intact, grossly non-focal.  Psychiatric: Alert and oriented, thought content appropriate, normal insight  Capillary Refill: Brisk,< 3 seconds   Peripheral Pulses: +2 palpable, equal bilaterally       Labs:   Recent Labs     03/09/21  0953 03/10/21  0736 03/11/21  0609   WBC 7.3 6.2 6.5   HGB 10.9* 10.8* 11.2*   HCT 33.0* 33.1* 33.9*    151 153     Recent Labs     03/09/21  0953 03/10/21  0736 03/11/21  0609   * 139 140   K 3.9 4.2 4.3    106 109   CO2 20* 24 23   BUN 31* 29* 31*   CREATININE 1.3* 1.1 1.1   CALCIUM 9.7 9.4 9.4     Recent Labs     03/09/21  0953   AST 15   ALT 11   BILITOT 0.6   ALKPHOS 64     No results for input(s): INR in the last 72 hours. Recent Labs     03/09/21  0953   TROPONINI <0.01       Urinalysis:      Lab Results   Component Value Date    NITRU Negative 06/22/2019    WBCUA 6-10 03/06/2019    BACTERIA Rare 03/06/2019    RBCUA 0-2 03/06/2019    BLOODU Negative 06/22/2019    SPECGRAV <=1.005 06/22/2019    GLUCOSEU Negative 06/22/2019       Radiology:  XR CHEST PORTABLE   Final Result   Right basilar opacity, pneumonia versus atelectasis. Radiographic follow-up   is recommended. Mild cardiac silhouette enlargement. Assessment/Plan:    Active Hospital Problems    Diagnosis    Acute respiratory failure with hypoxia ([facility] [address] [J96.01]     Acute hypoxic respiratory failure - likely due to CAP.   - supplemental O2 to keep sats > 92%. - wean as tolerated.     Pneumonia, RLL, likely CAP. - patient is NOT from a nursing home. - CXR showed RLL opacity. Complains of cough, SOB, fever, malaise.  - rapid COVID negative and has been fully vaccinated. - de-escalate abx to Azithro and Rocephin.  - follow BMP, CBC.     PAF - currently in NSR.  - continue home Eliquis, Toprol.     Chronic combined CHF - stable without exacerbation.  - last TTE 2019 - EF 45% with grade II DD.  - Continue home BB, ARB, and aldactone. - daily weights, I&O     CAD - stable. Continue home ASA, statin, BB, ARB.     TSH - continue levothyroxine. TSH 0.75.     UTI prophylaxis therapy - hold home mendelamine while on abx.       DVT Prophylaxis: Eliquis  Diet: DIET GENERAL;  Code Status: Limited - see ACP note    PT/OT Eval Status: ordered    Dispo - likely another 1-2 days.       ROBERT Walekr - CNP

## 2021-03-12 NOTE — CARE COORDINATION
CHIKI called and spoke with Brian Perez in intake at [facility]. She states that they have received referral and are following for DC. Please call the office on day of DC so that they may pull orders from Epic. Plan for pt to return home.      Jamal Bailey RN

## 2021-03-12 NOTE — PROGRESS NOTES
Hospitalist Progress Note      PCP: Rufina Benson MD    Date of Admission: 3/9/2021    Chief Complaint: Cough and SOB    Hospital Course:  80 y.o. female, with a PMH of CAD, CHF, HTN, and HLD, who presented to [facility] with cough and SOB. The patient complains of having a fever, cough, body aches, and headache for the previous 3 days. She had her 2nd COVID vaccine last on 2/11. She was very worried that perhaps, she did have COVID, so she decided to seek treatment. She denies abdominal pain, nausea, vomiting.       Subjective: OOB to chair. Weaned with RA  Feels much better today, but complains of constipation. Daughter brought in her home [employer]. Medications:  Reviewed    Infusion Medications   Scheduled Medications    levothyroxine  100 mcg Oral Daily    linaclotide  145 mcg Oral QAM AC    guaiFENesin  600 mg Oral BID    apixaban  5 mg Oral BID    spironolactone  25 mg Oral Daily    valsartan  40 mg Oral Daily    allopurinol  100 mg Oral Daily    aspirin  81 mg Oral Daily    atorvastatin  20 mg Oral Daily    docusate sodium  100 mg Oral BID    metoprolol succinate  50 mg Oral BID    sodium chloride flush  10 mL Intravenous 2 times per day    azithromycin  500 mg Intravenous Q24H    cefTRIAXone (ROCEPHIN) IV  1,000 mg Intravenous Q24H     PRN Meds: sodium chloride flush, promethazine **OR** ondansetron, polyethylene glycol, acetaminophen **OR** acetaminophen      Intake/Output Summary (Last 24 hours) at 3/12/2021 1408  Last data filed at 3/12/2021 0916  Gross per 24 hour   Intake 490 ml   Output    Net 490 ml       Physical Exam Performed:    /75   Pulse 93   Temp 97.5 °F (36.4 °C) (Oral)   Resp 14   Wt 181 lb (82.1 kg)   SpO2 95%   BMI 32.58 kg/m²     General appearance: No apparent distress, appears stated age and cooperative. HEENT: Pupils equal, round, and reactive to light. Conjunctivae/corneas clear. Neck: Supple, with full range of motion.  No jugular venous distention. Trachea midline. Respiratory:  Normal respiratory effort. Clear to auscultation, bilaterally without Rales/Wheezes/Rhonchi. On room air. Cardiovascular: Regular rate and rhythm with normal S1/S2 without murmurs, rubs or gallops. Abdomen: Soft, non-tender, non-distended with normal bowel sounds. Musculoskeletal: No clubbing, cyanosis or edema bilaterally. Full range of motion without deformity. Skin: Skin color, texture, turgor normal.  No rashes or lesions. Neurologic:  Neurovascularly intact without any focal sensory/motor deficits. Cranial nerves: II-XII intact, grossly non-focal.  Psychiatric: Alert and oriented, thought content appropriate, normal insight  Capillary Refill: Brisk,< 3 seconds   Peripheral Pulses: +2 palpable, equal bilaterally       Labs:   Recent Labs     03/10/21  0736 03/11/21  0609   WBC 6.2 6.5   HGB 10.8* 11.2*   HCT 33.1* 33.9*    153     Recent Labs     03/10/21  0736 03/11/21  0609 03/12/21  0743    140 141   K 4.2 4.3 4.8    109 108   CO2 24 23 25   BUN 29* 31* 35*   CREATININE 1.1 1.1 1.2   CALCIUM 9.4 9.4 9.7     No results for input(s): AST, ALT, BILIDIR, BILITOT, ALKPHOS in the last 72 hours. No results for input(s): INR in the last 72 hours. No results for input(s): Kaitlin Keith in the last 72 hours. Urinalysis:      Lab Results   Component Value Date    NITRU Negative 06/22/2019    WBCUA 6-10 03/06/2019    BACTERIA Rare 03/06/2019    RBCUA 0-2 03/06/2019    BLOODU Negative 06/22/2019    SPECGRAV <=1.005 06/22/2019    GLUCOSEU Negative 06/22/2019       Radiology:  XR CHEST PORTABLE   Final Result   Right basilar opacity, pneumonia versus atelectasis. Radiographic follow-up   is recommended. Mild cardiac silhouette enlargement. Assessment/Plan:    Active Hospital Problems    Diagnosis    Acute respiratory failure with hypoxia ([facility] [address] [J96.01]     Acute hypoxic respiratory failure - likely due to CAP.   - supplemental O2 to keep sats > 92%. - wean as tolerated.     Pneumonia, RLL, likely CAP. - patient is NOT from a nursing home. - CXR showed RLL opacity. Complains of cough, SOB, fever, malaise.  - rapid COVID negative and has been fully vaccinated. - de-escalate abx to Azithro and Rocephin.  - follow BMP, CBC.     PAF - currently in NSR.  - continue home Eliquis, Toprol.     Chronic combined CHF - stable without exacerbation.  - last TTE 2019 - EF 45% with grade II DD.  - Continue home BB, ARB, and aldactone. - daily weights, I&O     CAD - stable. Continue home ASA, statin, BB, ARB.     TSH - continue levothyroxine.   TSH 0.75.     UTI prophylaxis therapy - hold home mendelamine while on abx.       DVT Prophylaxis: Eliquis  Diet: DIET GENERAL;  Code Status: Limited - see ACP note    PT/OT Eval Status: home PT/OT    Dispo - hopefully tomorrow    ROBERT Alcaraz - CNP

## 2021-03-12 NOTE — PROGRESS NOTES
Occupational Therapy  Facility/Department: [facility] A2 CARD TELEMETRY  Daily Treatment Note and Discharge Summary  NAME: Anusha Main  : 1930  MRN: [id number]    Date of Service: 3/12/2021    Discharge Recommendations:  Home with assist PRN, Home with Home health OT  OT Equipment Recommendations  Equipment Needed: No    Assessment   Performance deficits / Impairments: Decreased functional mobility ; Decreased high-level IADLs;Decreased ADL status; Decreased endurance;Decreased balance  Assessment: Pt continues to be very pleasant and agreeable to treatment, requiring increased time to complete tasks, continues to improve with level of assistance for functional transfers/mobility, meeting appropriate established OT goals at this time. Pt continues to express no OT concerns for d/c, reporting has support as needed from family and friends/neighbors. OT Education: Plan of Care;OT Role;Precautions;Transfer Training;ADL Adaptive Strategies; Equipment; Energy Conservation;Orientation;Home Exercise Program  Patient Education: importance of mobility and ROM  REQUIRES OT FOLLOW UP: No  Activity Tolerance  Activity Tolerance: Patient Tolerated treatment well  Activity Tolerance: /78; HR 85, Spo2 97% at rest on RA, HR 97 and Spo2 91-93% on RA with >100' mobility  Safety Devices  Safety Devices in place: Yes  Type of devices: Nurse notified;Call light within reach; Left in bed(Pt left seated EOB, no alarm on arrival, okay per RN)         Patient Diagnosis(es): The primary encounter diagnosis was Pneumonia due to organism. A diagnosis of Acute hypoxemic respiratory failure (HCC) was also pertinent to this visit. has a past medical history of Arthritis, CAD (coronary artery disease), CHF (congestive heart failure) ([address] Depression, Diphtheria, Gout, Hyperlipemia, Hypertension, STEMI (ST elevation myocardial infarction) ([facility] [address] Thyroid disease, and Wears hearing aid.    has a past surgical history that includes Hysterectomy; Appendectomy; Tonsillectomy; Colonoscopy (4/29/2013); Breast surgery; Cardiac surgery (2007); joint replacement (Left, 8/13/14); other surgical history (Right, 1/6/14); and Cataract removal with implant (Right, 10/11/2017).     Restrictions  Restrictions/Precautions  Restrictions/Precautions: General Precautions, Up as Tolerated, Fall Risk  Subjective   General  Chart Reviewed: Yes  Patient assessed for rehabilitation services?: Yes  Family / Caregiver Present: No  Referring Practitioner: ROBERT Fonseca CNP  Diagnosis: Acute hypoxic respiratory failure, PNA, PAF, Chronic combined CHF  Subjective  Subjective: Pt seated EOB on arrival, very pleasant and agreeable to treatment  General Comment  Comments: Per RN okay to treat  Vital Signs  Patient Currently in Pain: Denies   Orientation  Orientation  Overall Orientation Status: Within Functional Limits  Objective    ADL  Feeding: Independent  Grooming: Setup;Supervision  LE Dressing: Minimal assistance;Setup(Pt reports use of sock aid at home)        Balance  Sitting Balance: Modified independent   Standing Balance: Supervision  Standing Balance  Time: >5 minutes, 1-2 minutes, 2-3 minutes  Activity: standing ADL, mobility, transfers  Functional Mobility  Functional - Mobility Device: Rolling Walker  Activity: (>150')  Assist Level: Supervision  Bed mobility  Supine to Sit: Unable to assess  Sit to Supine: Unable to assess  Comment: Pt seated EOB on arrival and exit  Transfers  Sit to stand: Modified independent;Supervision  Stand to sit: Modified independent           Cognition  Overall Cognitive Status: WFL        Type of ROM/Therapeutic Exercise  Type of ROM/Therapeutic Exercise: AROM  Comment: BUE/BLE seated EOB  Exercises  Scapular Protraction: x5  Scapular Retraction: x5  Grasp/Release: x10  Other: x10 ankle pumps, x5 glute set           Plan   Plan  Times per week: 3-5x/week  Current Treatment Recommendations: Strengthening, Balance Training, Functional Mobility Training, Endurance Training, Equipment Evaluation, Education, & procurement, Patient/Caregiver Education & Training, Safety Education & Training, Gait Training, Self-Care / ADL, Home Management Training    Goals  Short term goals  Time Frame for Short term goals: 1 week (by 3/17/21)  Short term goal 1: Pt will complete functional transfers with Mod I - GOAL MET  Short term goal 2: Pt will complete LE dressing with setup by 3/15 - GOAL NOT MET, d/t no sock aid available - pt uses at home, discontinue goal  Short term goal 3: Pt will perform 3 minutes dynamic standing activity with Supervision - GOAL MET 3/11  Patient Goals   Patient goals : \"to go home\"       Therapy Time   Individual Concurrent Group Co-treatment   Time In 0945         Time Out 1026         Minutes 41         Timed Code Treatment Minutes: 41 Minutes     This note to serve as discharge summary should pt discharge prior to next session.     Franklin Jacobo, OTR/L

## 2021-03-12 NOTE — FLOWSHEET NOTE
03/12/21 0800   Oxygen Therapy   SpO2 97 %   Pulse Oximeter Device Mode Intermittent   Pulse Oximeter Device Location Finger   O2 Device Nasal cannula   O2 Flow Rate (L/min) 1 L/min     Weaning patient to room air at this time. Will continue to monitor.

## 2021-03-12 NOTE — PROGRESS NOTES
Physical Therapy  Facility/Department: [facility] A2 CARD TELEMETRY  Daily Treatment Note  NAME: Saul Rubio  : 1930  MRN: [id number]    Date of Service: 3/12/2021    Discharge Recommendations:  Home with Home health PT, Home with assist PRN   PT Equipment Recommendations  Equipment Needed: No    Assessment   Body structures, Functions, Activity limitations: Decreased functional mobility ; Decreased balance;Decreased endurance  Assessment: Pt is pleasant and agreeable to PT tx with no c/o pain. Pt demonstrates overall good progression with functional mobility and gait but demonstrates two latearal LOB with gait requiring Leif to correct. Pt demonstrates bed mobility with S, t/f with RW and SBA and ambulates 250' with RW and Leif. Pt is limited by decreased activity tolerance and decreased strength. Pt will benefit from continued skilled PT in acute care setting to address above deficits. Treatment Diagnosis: impaired functional mobility  Specific instructions for Next Treatment: progress mobility as tolerated  Prognosis: Good  Decision Making: Low Complexity  PT Education: Goals; General Safety;Gait Training;PT Role;Disease Specific Education;Plan of Care; Functional Mobility Training;Transfer Training; Injury Prevention  Patient Education: Pt educated on importance of OOB mobility and safe gait training -- pt verbalized understanding  Barriers to Learning: none  REQUIRES PT FOLLOW UP: Yes  Activity Tolerance  Activity Tolerance: Patient Tolerated treatment well  Activity Tolerance: SpO2 maintained >93% on RA with gait activities     Patient Diagnosis(es): The primary encounter diagnosis was Pneumonia due to organism. A diagnosis of Acute hypoxemic respiratory failure (HCC) was also pertinent to this visit.      has a past medical history of Arthritis, CAD (coronary artery disease), CHF (congestive heart failure) ([facility] [address] Depression, Diphtheria, Gout, Hyperlipemia, Hypertension, STEMI (ST elevation myocardial balance  Stairs/Curb  Stairs?: No        Balance  Posture: Fair  Sitting - Static: Good  Sitting - Dynamic: Good  Standing - Static: Fair;+  Standing - Dynamic: Fair  Comments: CGA to SBA with RW, Leif for LOB with gait     Exercises  Hip Flexion: Seated BLE x 10  Knee Long Arc Quad: Seated BLE x 10  Ankle Pumps: Seated BLE x 10  Comments: Cues for sequence/technique                          AM-PAC Score     AM-PAC Inpatient Mobility without Stair Climbing Raw Score : 16 (03/12/21 1811)  AM-PAC Inpatient without Stair Climbing T-Scale Score : 45.54 (03/12/21 1811)  Mobility Inpatient CMS 0-100% Score: 40.64 (03/12/21 1811)  Mobility Inpatient without Stair CMS G-Code Modifier : CK (03/12/21 1811)       Goals  Short term goals  Time Frame for Short term goals: 1 week (3/17) unless otherwise specified  Short term goal 1: Pt will be mod I with bed mobility. -- 312: supine to sit S  Short term goal 2: Pt will be mod I with transfers with RW. -- 3/12: SBA with RW  Short term goal 3: Pt will ambulate 150 ft mod I with RW. -- 3/12: 250' with RW Leif  Short term goal 4: 3/14: Pt will participate in 12-15 reps of BLE exercises to promote strength and activity tolerance. --3/12: x 10 reps BLE exercises  Patient Goals   Patient goals : \"to go home\"    Plan    Plan  Times per week: 3-5x/wk  Times per day: Daily  Specific instructions for Next Treatment: progress mobility as tolerated  Current Treatment Recommendations: Strengthening, Neuromuscular Re-education, Home Exercise Program, Safety Education & Training, Balance Training, Endurance Training, Functional Mobility Training, Transfer Training, Gait Training, Equipment Evaluation, Education, & procurement, Patient/Caregiver Education & Training  Safety Devices  Type of devices:  All fall risk precautions in place, Call light within reach, Gait belt, Patient at risk for falls, Nurse notified, Left in chair, Bed alarm in place(Seated EOB)     Therapy Time   Individual Concurrent Group Co-treatment   Time In 1546         Time Out 1615         Minutes 29         Timed Code Treatment Minutes: 29 Minutes     If pt is unable to be seen after this session, please let this note serve as discharge summary. Please see case management note for discharge disposition. Thank you.     Avi Shepard, PT, DPT

## 2021-03-13 LAB
ANION GAP SERPL CALCULATED.3IONS-SCNC: 8 MMOL/L (ref 3–16)
BASOPHILS ABSOLUTE: 0.1 K/UL (ref 0–0.2)
BASOPHILS RELATIVE PERCENT: 1 %
BLOOD CULTURE, ROUTINE: NORMAL
BUN BLDV-MCNC: 26 MG/DL (ref 7–20)
CALCIUM SERPL-MCNC: 10.1 MG/DL (ref 8.3–10.6)
CHLORIDE BLD-SCNC: 104 MMOL/L (ref 99–110)
CO2: 25 MMOL/L (ref 21–32)
CREAT SERPL-MCNC: 1.1 MG/DL (ref 0.6–1.2)
CULTURE, BLOOD 2: NORMAL
EOSINOPHILS ABSOLUTE: 0.5 K/UL (ref 0–0.6)
EOSINOPHILS RELATIVE PERCENT: 7.9 %
GFR AFRICAN AMERICAN: 56
GFR NON-AFRICAN AMERICAN: 47
GLUCOSE BLD-MCNC: 101 MG/DL (ref 70–99)
HCT VFR BLD CALC: 34.9 % (ref 36–48)
HEMOGLOBIN: 11.4 G/DL (ref 12–16)
LYMPHOCYTES ABSOLUTE: 1.6 K/UL (ref 1–5.1)
LYMPHOCYTES RELATIVE PERCENT: 25.4 %
MCH RBC QN AUTO: 30.6 PG (ref 26–34)
MCHC RBC AUTO-ENTMCNC: 32.7 G/DL (ref 31–36)
MCV RBC AUTO: 93.5 FL (ref 80–100)
MONOCYTES ABSOLUTE: 0.5 K/UL (ref 0–1.3)
MONOCYTES RELATIVE PERCENT: 8.4 %
NEUTROPHILS ABSOLUTE: 3.7 K/UL (ref 1.7–7.7)
NEUTROPHILS RELATIVE PERCENT: 57.3 %
PDW BLD-RTO: 14.6 % (ref 12.4–15.4)
PLATELET # BLD: 196 K/UL (ref 135–450)
PMV BLD AUTO: 9.4 FL (ref 5–10.5)
POTASSIUM SERPL-SCNC: 4.4 MMOL/L (ref 3.5–5.1)
RBC # BLD: 3.73 M/UL (ref 4–5.2)
SODIUM BLD-SCNC: 137 MMOL/L (ref 136–145)
WBC # BLD: 6.4 K/UL (ref 4–11)

## 2021-03-13 PROCEDURE — 36415 COLL VENOUS BLD VENIPUNCTURE: CPT

## 2021-03-13 PROCEDURE — 2580000003 HC RX 258: Performed by: NURSE PRACTITIONER

## 2021-03-13 PROCEDURE — 85025 COMPLETE CBC W/AUTO DIFF WBC: CPT

## 2021-03-13 PROCEDURE — 6360000002 HC RX W HCPCS: Performed by: NURSE PRACTITIONER

## 2021-03-13 PROCEDURE — 6370000000 HC RX 637 (ALT 250 FOR IP): Performed by: NURSE PRACTITIONER

## 2021-03-13 PROCEDURE — 1200000000 HC SEMI PRIVATE

## 2021-03-13 PROCEDURE — 80048 BASIC METABOLIC PNL TOTAL CA: CPT

## 2021-03-13 RX ADMIN — VALSARTAN 40 MG: 80 TABLET, FILM COATED ORAL at 08:37

## 2021-03-13 RX ADMIN — ALLOPURINOL 100 MG: 100 TABLET ORAL at 08:38

## 2021-03-13 RX ADMIN — SPIRONOLACTONE 25 MG: 25 TABLET ORAL at 08:38

## 2021-03-13 RX ADMIN — APIXABAN 5 MG: 5 TABLET, FILM COATED ORAL at 08:38

## 2021-03-13 RX ADMIN — AZITHROMYCIN MONOHYDRATE 500 MG: 500 INJECTION, POWDER, LYOPHILIZED, FOR SOLUTION INTRAVENOUS at 16:10

## 2021-03-13 RX ADMIN — ACETAMINOPHEN 650 MG: 325 TABLET ORAL at 01:44

## 2021-03-13 RX ADMIN — METOPROLOL SUCCINATE 50 MG: 50 TABLET, EXTENDED RELEASE ORAL at 08:38

## 2021-03-13 RX ADMIN — GUAIFENESIN 600 MG: 600 TABLET, EXTENDED RELEASE ORAL at 08:38

## 2021-03-13 RX ADMIN — METOPROLOL SUCCINATE 50 MG: 50 TABLET, EXTENDED RELEASE ORAL at 17:30

## 2021-03-13 RX ADMIN — ATORVASTATIN CALCIUM 20 MG: 10 TABLET, FILM COATED ORAL at 08:38

## 2021-03-13 RX ADMIN — APIXABAN 5 MG: 5 TABLET, FILM COATED ORAL at 21:21

## 2021-03-13 RX ADMIN — Medication 10 ML: at 21:21

## 2021-03-13 RX ADMIN — LEVOTHYROXINE SODIUM 100 MCG: 0.1 TABLET ORAL at 06:58

## 2021-03-13 RX ADMIN — Medication 10 ML: at 08:38

## 2021-03-13 RX ADMIN — ASPIRIN 81 MG: 81 TABLET, CHEWABLE ORAL at 08:38

## 2021-03-13 RX ADMIN — CEFTRIAXONE SODIUM 1000 MG: 1 INJECTION, POWDER, FOR SOLUTION INTRAMUSCULAR; INTRAVENOUS at 15:36

## 2021-03-13 RX ADMIN — GUAIFENESIN 600 MG: 600 TABLET, EXTENDED RELEASE ORAL at 21:21

## 2021-03-13 ASSESSMENT — PAIN SCALES - GENERAL
PAINLEVEL_OUTOF10: 0
PAINLEVEL_OUTOF10: 5
PAINLEVEL_OUTOF10: 0

## 2021-03-13 NOTE — PROGRESS NOTES
Patient sitting up in the chair. Complaining of \"head feels full. \"  States slightly lightheaded. Blood pressure 95/58, heart rate 92. Blood pressure earlier this am was 143/84. No chest pain, shortness of breath. Oxygen room air level 95%. Got patient back in bed. No changes in heart rhythm. Will continue to monitor.  KIKO LION

## 2021-03-13 NOTE — PROGRESS NOTES
Hospitalist Progress Note  3/13/2021 11:12 AM  Subjective:   Admit Date: 3/9/2021  PCP: Sindhu Davis MD Status: Inpatient [101]  Interval History: Hospital Day: 5, admitted with acute hypoxic respiratory failure secondary to community acquired pneumonia treated with five days of azithromycin and ceftriaxone. CXR showed RLL opacity. Ambulating with some dyspnea, O2sat > 96% on room air. Noted to have low blood pressure. DIET GENERAL; Left antecubital peripheral IV (3/9, day #5)  Medications:     levothyroxine  100 mcg Oral Daily   linaclotide (Linzess)  145 mcg Oral QAM AC   guaifenesin  600 mg Oral BID   apixaban  5 mg Oral BID   spironolactone  25 mg Oral Daily   valsartan  40 mg Oral Daily   allopurinol  100 mg Oral Daily   aspirin  81 mg Oral Daily   atorvastatin  20 mg Oral Daily   docusate sodium  100 mg Oral BID   metoprolol succinate  50 mg Oral BID   azithromycin  500 mg Intravenous Q24H (3/9, day #5 of 5)   ceftriaxone  1,000 mg Intravenous Q24H (3/9, day #5)     Recent Labs     03/11/21  0609 03/13/21  0812   WBC 6.5 6.4   HGB 11.2* 11.4*    196   MCV 92.6 93.5     Recent Labs     03/11/21  0609 03/12/21  0743 03/13/21  0812    141 137   K 4.3 4.8 4.4    108 104   CO2 23 25 25   BUN 31* 35* 26*   CREATININE 1.1 1.2 1.1   GLUCOSE 100* 106* 101*     proBNP (3/12) 4,790 pg/mL  TSH (3/12) 0.75 uIU/mL  Procalcitonin (3/9) 0.02 ng/mL    Blood culture x 2 (3/9) no growth to date  SARS-CoV-2, NAAT (3/9) Not Detected     Portable CXR (3/9) Right basilar opacity, pneumonia versus atelectasis.  Radiographic follow-up is recommended. Mild cardiac silhouette enlargement.     Objective:   Vitals:  /58   Pulse 60   Temp 97.8 °F (oral)   Resp 16   Wt 82.1 kg  SpO2 93% on RA  BMI 32.58 kg/m²   General appearance: alert and cooperative with exam  Lungs: diffuse rhonchi with reasonable overall air movement  Heart: regular rate and rhythm, S1, S2 normal, no murmur, click, rub or gallop  Abdomen: soft, non-tender; bowel sounds normal; no masses,  no organomegaly  Extremities: extremities normal, atraumatic, no cyanosis or edema  Neurologic: No obvious focal neurologic deficits. Assessment and Plan:   1. Acute hypoxic respiratory failure secondary to RLL community acquired pneumonia:  Supplemental oxygen initially and now on room air. Deconditioning exacerbating dyspnea and fatigue with ambulation. Antibiotic therapy with ceftriaxone and azithromycin to complete a five day course. No recent nursing home or hospitalization. Rapid COVID negative and has been fully vaccinated.   2. Paroxysmal atrial fibrillation:  Currently in sinus rhythm. Anticoagulation with apixaban 5 mg BID. Rate control with metoprolol succinate 50 mg BID. 3. Chronic combined CHF - stable without exacerbation. TTE 2019 - EF 45% with grade II diastolic dysfunction. Continues on metoprolol succinate, valsartan 40 mg daily, and spironolactone 25 mg daily. 4. CAD - stable.  Continue home ASA, statin, BB, ARB. 5. Hypothyroid:  Continues on 100 mcg daily. TSH 0.75.  6. UTI prophylaxis therapy - hold home mendelamine while on abx. Advance Directive: Limited (No to four questions)  DVT prophylaxis with apixaban. Discharge planning: Sunday, March 14. PT/OT recommends home health. Referral to [address] [place] [address].         Lacey Gates MD  [facility] Hospitalist

## 2021-03-13 NOTE — PLAN OF CARE
Problem: Falls - Risk of:  Goal: Will remain free from falls  Description: Will remain free from falls  3/13/2021 1127 by Ebonie Juarez RN  Outcome: Ongoing   Bed/chair alarm on and encouraged patient to call for assistance before getting out of bed. Problem: Pain:  Goal: Pain level will decrease  Description: Pain level will decrease  3/13/2021 1127 by Ebonie Juarez RN  Outcome: Ongoing   Monitor pain level and give pain medication prn.

## 2021-03-13 NOTE — PROGRESS NOTES
Patient's heart rate now 57. Patient states she feels better with the fullness in her head. Blood pressure 113/58, pulse 60, room air oxygen 94%. States she feels a little heavy in her chest with deep breaths. She just did incentive spirometry right before her feeling of head fullness started. Will continue to monitor.  HERVE Melgoza

## 2021-03-14 VITALS
OXYGEN SATURATION: 95 % | RESPIRATION RATE: 14 BRPM | SYSTOLIC BLOOD PRESSURE: 114 MMHG | WEIGHT: 181 LBS | TEMPERATURE: 98.2 F | BODY MASS INDEX: 32.58 KG/M2 | DIASTOLIC BLOOD PRESSURE: 61 MMHG | HEART RATE: 60 BPM

## 2021-03-14 PROCEDURE — 2580000003 HC RX 258: Performed by: NURSE PRACTITIONER

## 2021-03-14 PROCEDURE — 6370000000 HC RX 637 (ALT 250 FOR IP): Performed by: NURSE PRACTITIONER

## 2021-03-14 RX ORDER — GUAIFENESIN 600 MG/1
600 TABLET, EXTENDED RELEASE ORAL 2 TIMES DAILY
Qty: 60 TABLET | Refills: 1 | Status: SHIPPED | OUTPATIENT
Start: 2021-03-14

## 2021-03-14 RX ADMIN — APIXABAN 5 MG: 5 TABLET, FILM COATED ORAL at 08:47

## 2021-03-14 RX ADMIN — ALLOPURINOL 100 MG: 100 TABLET ORAL at 08:47

## 2021-03-14 RX ADMIN — SPIRONOLACTONE 25 MG: 25 TABLET ORAL at 08:47

## 2021-03-14 RX ADMIN — GUAIFENESIN 600 MG: 600 TABLET, EXTENDED RELEASE ORAL at 08:47

## 2021-03-14 RX ADMIN — DOCUSATE SODIUM 100 MG: 100 CAPSULE, LIQUID FILLED ORAL at 08:48

## 2021-03-14 RX ADMIN — VALSARTAN 40 MG: 80 TABLET, FILM COATED ORAL at 08:47

## 2021-03-14 RX ADMIN — ASPIRIN 81 MG: 81 TABLET, CHEWABLE ORAL at 08:47

## 2021-03-14 RX ADMIN — METOPROLOL SUCCINATE 50 MG: 50 TABLET, EXTENDED RELEASE ORAL at 08:47

## 2021-03-14 RX ADMIN — Medication 10 ML: at 08:48

## 2021-03-14 RX ADMIN — ATORVASTATIN CALCIUM 20 MG: 10 TABLET, FILM COATED ORAL at 08:47

## 2021-03-14 RX ADMIN — LEVOTHYROXINE SODIUM 100 MCG: 0.1 TABLET ORAL at 05:45

## 2021-03-14 ASSESSMENT — PAIN SCALES - GENERAL: PAINLEVEL_OUTOF10: 0

## 2021-03-14 NOTE — PROGRESS NOTES
Patient given discharge instructions and voiced understanding via teach back. Patient discharged in stable condition with all belongings. Gave patient her Linzess back to her. Patient discharged in stable condition with all belongings. To car via wheelchair. Home with daughter.  SWETHA,RV

## 2021-03-14 NOTE — DISCHARGE SUMMARY
Hospital Medicine Discharge Summary    Donovan Rodriguez  :  1930  MRN:  [id number]    Admit date:  3/9/2021  Discharge date:  3/14/2021    Admitting Physician:  Alex Pineda MD  Primary Care Physician:  Brock Hall MD  Code Status:   Limited  Status: Inpatient [101]  Discharged Condition: Stable  Activity: activity as tolerated  Diet:  DIET GENERAL;      Discharge Diagnoses:      Acute hypoxic respiratory failure    RLL community acquired pneumonia    Paroxysmal atrial fibrillation    Chronic combined CHF    CAD    Hypothyroid    Chronic UTI    Hospital Course:   80 y.o. female admitted with acute hypoxic respiratory failure secondary to community acquired pneumonia treated with five days of azithromycin and ceftriaxone. CXR showed RLL opacity. Ambulating with some dyspnea, O2sat > 96% on room air. Noted to have low blood pressure. Ready for discharge. Completed five days of combination antibiotic. 1. Acute hypoxic respiratory failure secondary to RLL community acquired pneumonia:  Supplemental oxygen initially and now on room air. Deconditioning exacerbating dyspnea and fatigue with ambulation. Antibiotic therapy with ceftriaxone and azithromycin to complete a five day course. No recent nursing home or hospitalization. Rapid COVID negative and has been fully vaccinated.   2. Paroxysmal atrial fibrillation:  Currently in sinus rhythm. Anticoagulation with apixaban 5 mg BID. Rate control with metoprolol succinate 50 mg BID. 3. Chronic combined CHF - stable without exacerbation. TTE  - EF 45% with grade II diastolic dysfunction. Continues on metoprolol succinate, valsartan 40 mg daily, and spironolactone 25 mg daily. 4. CAD - stable.  Continue home ASA, statin, BB, ARB. 5. Hypothyroid:  Continues on 100 mcg daily. TSH 0.75.  6. UTI prophylaxis therapy - hold home mendelamine while on antibiotics, restarted at discharge.      Discharge Medications:  New script in [facility], Studies:   proBNP (3/12) 4,790 pg/mL  TSH (3/12) 0.75 uIU/mL  Procalcitonin (3/9) 0.02 ng/mL  Blood culture x 2 (3/9) no growth to date  SARS-CoV-2, NAAT (3/9) Not Detected     Portable CXR (3/9) Right basilar opacity, pneumonia versus atelectasis.  Radiographic follow-up is recommended. Mild cardiac silhouette enlargement. Treatments:   IV antibiotics ceftriaxone and azithromycin x 5 days, supplemental oxygen, PT/OT    Disposition:   Home with self care and home health with [address] [place] [address]. Follow up with Jeannette You MD in 1-2 weeks.       Signed:  SARAH BOLANOS  3/14/2021, 11:41 AM

## 2021-03-14 NOTE — CARE COORDINATION
CASE MANAGEMENT DISCHARGE SUMMARY      Discharge to: home with [address] ordered/agency:na     Transportation:    Family/car: private    Confirmed discharge plan with:RN,Superior     Patient: yes     Facility/Agency, name:  [facility] [id number]   Phone number for report to facility: [phone]     RN, name: Ursula Parnell    Note: Discharging nurse to complete [facility], reconcile [facility], and place final copy with patient's discharge packet. RN to ensure that written prescriptions for  Level II medications are sent with patient to the facility as per protocol.

## 2021-03-14 NOTE — DISCHARGE INSTR - COC
Continuity of Care Form    Patient Name: Teo Mcdaniels   :  1930  MRN:  [id number]    Admit date:  3/9/2021  Discharge date:  3/14/2021    Code Status Order: Limited   Advance Directives:   Advance Care Flowsheet Documentation       Date/Time Healthcare Directive Type of Healthcare Directive Copy in [address] Agent's Name Healthcare Agent's Phone Number    03/10/21 1640  Yes, patient has an advance directive for healthcare treatment  Living will;Durable power of  for health care  No, copy requested from family  [facility] Physician:  Katelyn Martinez MD  PCP: Daniella Ding MD    Discharging Nurse: [facility] Unit/Room#: 0219/0219-01  Discharging Unit Phone Number: ***    Emergency Contact:   Extended Emergency Contact Information  Primary Emergency Contact: [facility]  Address: [facility] [address]  Phone: [phone]  Mobile Phone: [phone]  Relation: Child  Secondary Emergency Contact: Dylan King [facility] [address] Phone: [phone]  Mobile Phone: [phone]  Relation: Grandchild    Past Surgical History:  Past Surgical History:   Procedure Laterality Date    APPENDECTOMY      BREAST SURGERY      biopsy benign    CARDIAC SURGERY      stent    CATARACT REMOVAL WITH IMPLANT Right 10/11/2017    COLONOSCOPY  2013    HYSTERECTOMY      JOINT REPLACEMENT Left 14    LEFT TOTAL KNEE REPLACEMENT WITH FEMORAL NERVE BLOCK FOR PAIN    OTHER SURGICAL HISTORY Right 14    right total knee replacement    TONSILLECTOMY         Immunization History: There is no immunization history on file for this patient.     Active Problems:  Patient Active Problem List   Diagnosis Code    Anginal pain (HCC) I20.9    CAD (coronary artery disease) I25.10    Dyspnea on exertion R06.00    Primary localized osteoarthrosis, lower leg M17.10    Left TKR Z96.659    HTN (hypertension) I10    Hyperlipidemia E78.5    Right TKR Z96.659    Trochanteric bursitis of right hip M70.61    Syncope and collapse R55    Acute coronary syndrome (HCC) I24.9    Paroxysmal atrial fibrillation (HCC) I48.0    SVT (supraventricular tachycardia) (HCC) I47.1    Ischemic cardiomyopathy I25.5    Acute respiratory failure with hypoxia (HCC) J96.01       Isolation/Infection:   Isolation            No Isolation          Patient Infection Status       Infection Onset Added Last Indicated Last Indicated By Review Planned Expiration Resolved Resolved By    None active    Resolved    COVID-19 Rule Out 03/09/21 03/09/21 03/09/21 COVID-19, Rapid (Ordered)   03/09/21 Rule-Out Test Resulted            Nurse Assessment:  Last Vital Signs: /61   Pulse 60   Temp 98.2 °F (36.8 °C) (Oral)   Resp 14   Wt 181 lb (82.1 kg)   SpO2 95%   BMI 32.58 kg/m²     Last documented pain score (0-10 scale): Pain Level: 0  Last Weight:   Wt Readings from Last 1 Encounters:   03/09/21 181 lb (82.1 kg)     Mental Status:  oriented, alert, coherent and logical    IV Access:  - None    Nursing Mobility/ADLs:  Walking   Independent  Transfer  Independent  Bathing  Independent  Dressing  Independent  Toileting  Independent  Feeding  [address]  Independent  Med Delivery   whole    Wound Care Documentation and Therapy:        Elimination:  Continence:   · Bowel: Yes  · Bladder: Yes  Urinary Catheter: None   Colostomy/Ileostomy/Ileal Conduit: No       Date of Last BM: 3/12/21    Intake/Output Summary (Last 24 hours) at 3/14/2021 1142  Last data filed at 3/14/2021 0758  Gross per 24 hour   Intake 780 ml   Output 201 ml   Net 579 ml     I/O last 3 completed shifts: In: 80 [P.O.:780]  Out: 401 [Urine:401]    Safety Concerns:      At Risk for Falls    Impairments/Disabilities:      None    Nutrition Therapy:  Current Nutrition Therapy:   - Oral Diet:  General    Routes of Feeding: Oral  Liquids:

## 2021-03-14 NOTE — PLAN OF CARE
Problem: Falls - Risk of:  Goal: Will remain free from falls  Description: Will remain free from falls  Outcome: Completed     Problem: Pain:  Goal: Control of acute pain  Description: Control of acute pain  Outcome: Completed

## 2021-12-06 ENCOUNTER — HOSPITAL ENCOUNTER (EMERGENCY)
Age: 86
Discharge: HOME | End: 2021-12-06
Payer: MEDICARE

## 2021-12-06 ENCOUNTER — APPOINTMENT (OUTPATIENT)
Dept: CT IMAGING | Age: 86
End: 2021-12-06
Payer: MEDICARE

## 2021-12-06 VITALS
SYSTOLIC BLOOD PRESSURE: 117 MMHG | HEIGHT: 61 IN | DIASTOLIC BLOOD PRESSURE: 67 MMHG | RESPIRATION RATE: 20 BRPM | WEIGHT: 180 LBS | TEMPERATURE: 97.8 F | HEART RATE: 85 BPM | OXYGEN SATURATION: 93 % | BODY MASS INDEX: 33.99 KG/M2

## 2021-12-06 DIAGNOSIS — M54.50 ACUTE EXACERBATION OF CHRONIC LOW BACK PAIN: Primary | ICD-10-CM

## 2021-12-06 DIAGNOSIS — G89.29 ACUTE EXACERBATION OF CHRONIC LOW BACK PAIN: Primary | ICD-10-CM

## 2021-12-06 PROCEDURE — 96372 THER/PROPH/DIAG INJ SC/IM: CPT

## 2021-12-06 PROCEDURE — 97162 PT EVAL MOD COMPLEX 30 MIN: CPT

## 2021-12-06 PROCEDURE — 97535 SELF CARE MNGMENT TRAINING: CPT

## 2021-12-06 PROCEDURE — 6360000002 HC RX W HCPCS: Performed by: NURSE PRACTITIONER

## 2021-12-06 PROCEDURE — 97530 THERAPEUTIC ACTIVITIES: CPT

## 2021-12-06 PROCEDURE — 97166 OT EVAL MOD COMPLEX 45 MIN: CPT

## 2021-12-06 PROCEDURE — 97116 GAIT TRAINING THERAPY: CPT

## 2021-12-06 PROCEDURE — 6370000000 HC RX 637 (ALT 250 FOR IP): Performed by: NURSE PRACTITIONER

## 2021-12-06 PROCEDURE — 72131 CT LUMBAR SPINE W/O DYE: CPT

## 2021-12-06 PROCEDURE — 99284 EMERGENCY DEPT VISIT MOD MDM: CPT

## 2021-12-06 RX ORDER — PREDNISONE 10 MG/1
TABLET ORAL
Qty: 20 TABLET | Refills: 0 | Status: SHIPPED | OUTPATIENT
Start: 2021-12-06 | End: 2021-12-16

## 2021-12-06 RX ORDER — CYCLOBENZAPRINE HCL 10 MG
10 TABLET ORAL 3 TIMES DAILY PRN
Qty: 21 TABLET | Refills: 0 | Status: SHIPPED | OUTPATIENT
Start: 2021-12-06 | End: 2021-12-16

## 2021-12-06 RX ORDER — ORPHENADRINE CITRATE 30 MG/ML
60 INJECTION INTRAMUSCULAR; INTRAVENOUS ONCE
Status: COMPLETED | OUTPATIENT
Start: 2021-12-06 | End: 2021-12-06

## 2021-12-06 RX ORDER — PREDNISONE 20 MG/1
40 TABLET ORAL ONCE
Status: COMPLETED | OUTPATIENT
Start: 2021-12-06 | End: 2021-12-06

## 2021-12-06 RX ORDER — HYDROCODONE BITARTRATE AND ACETAMINOPHEN 5; 325 MG/1; MG/1
1 TABLET ORAL ONCE
Status: DISCONTINUED | OUTPATIENT
Start: 2021-12-06 | End: 2021-12-06

## 2021-12-06 RX ADMIN — ORPHENADRINE CITRATE 60 MG: 30 INJECTION INTRAMUSCULAR; INTRAVENOUS at 12:28

## 2021-12-06 RX ADMIN — PREDNISONE 40 MG: 20 TABLET ORAL at 15:07

## 2021-12-06 ASSESSMENT — PAIN DESCRIPTION - DESCRIPTORS: DESCRIPTORS: RADIATING

## 2021-12-06 ASSESSMENT — PAIN DESCRIPTION - LOCATION
LOCATION: BACK

## 2021-12-06 ASSESSMENT — PAIN DESCRIPTION - ORIENTATION
ORIENTATION: LOWER
ORIENTATION: LOWER

## 2021-12-06 ASSESSMENT — PAIN SCALES - GENERAL
PAINLEVEL_OUTOF10: 4
PAINLEVEL_OUTOF10: 8
PAINLEVEL_OUTOF10: 4
PAINLEVEL_OUTOF10: 6
PAINLEVEL_OUTOF10: 10

## 2021-12-06 ASSESSMENT — PAIN DESCRIPTION - PAIN TYPE
TYPE: ACUTE PAIN

## 2021-12-06 NOTE — PROGRESS NOTES
Physical Therapy    Facility/Department: [facility]  ED  Initial Assessment, treatment, DC summary    NAME: Siria Sheffield  : 1930  MRN: [id number]    Date of Service: 2021    Discharge Recommendations:  24 hour supervision or assist, Home with Home health PT   PT Equipment Recommendations  Equipment Needed: No (has RW)    Assessment   Assessment: Pt referred for PT evaluation from the ER with c/o debilitating LBP. Pt received pain medication prior to PT, reporting no pain at rest and only 3-4/10 pain with mobility at this time. Pt was able to perform bed mobility with Leif (has been sleeping in her recliner), and is SBA for transfers and gait with RW. Pt does not use RW at baseline but would benefit from one at this time. Recommend home with initial 24 hr sup/assist and HHPT at DC from acute setting  Treatment Diagnosis: impaired mobility  Prognosis: Good  Decision Making: Medium Complexity  PT Education: Goals; Gait Training; PT Role; Disease Specific Education  Patient Education: Pt verbalized understanding of need for RW vs 4WW at this time for safety  Barriers to Learning: no  No Skilled PT: Safe to return home (with daughter)  REQUIRES PT FOLLOW UP: No  Activity Tolerance  Activity Tolerance: Patient Tolerated treatment well  Activity Tolerance: /92; HR 68; SpO2 on RA 94%       Patient Diagnosis(es): The encounter diagnosis was Acute exacerbation of chronic low back pain. has a past medical history of Arthritis, CAD (coronary artery disease), CHF (congestive heart failure) ([address] Depression, Diphtheria, Gout, Hyperlipemia, Hypertension, STEMI (ST elevation myocardial infarction) ([address] Thyroid disease, and Wears hearing aid. has a past surgical history that includes Hysterectomy; Appendectomy; Tonsillectomy; Colonoscopy (2013);  Breast surgery; Cardiac surgery (); joint replacement (Left, 14); other surgical history (Right, 14); and Cataract removal with implant (Right, 10/11/2017). Restrictions  Restrictions/Precautions  Restrictions/Precautions: Fall Risk  Vision/Hearing  Vision: Impaired  Vision Exceptions: Wears glasses at all times  Hearing: Exceptions to [facility]  Hearing Exceptions: Hard of hearing/hearing concerns     Subjective  General  Chart Reviewed: Yes  Patient assessed for rehabilitation services?: Yes  Response To Previous Treatment: Not applicable  Family / Caregiver Present: Yes (daughter)  Referring Practitioner: Radha Garay CNP  Referral Date : 12/06/21  Diagnosis: back pain  Follows Commands: Within Functional Limits  General Comment  Comments: Pt resting in bed upon entry, RN cleared pt for therapy  Subjective  Subjective: Pt agreeable to PT, reporting the shot she got for pain really helped  Pain Screening  Patient Currently in Pain: Yes  Pain Assessment  Pain Assessment: 0-10  Pain Level: 4  Pain Type: Acute pain  Pain Location: Back  Pain Orientation: Lower  Non-Pharmaceutical Pain Intervention(s): Ambulation/Increased Activity; Repositioned; Therapeutic presence  Vital Signs  Patient Currently in Pain: Yes  Pre Treatment Pain Screening  Intervention List: Patient able to continue with treatment    Orientation  Orientation  Overall Orientation Status: Within Normal Limits  Social/Functional History  Social/Functional History  Lives With: Alone  Type of Home: Apartment  Home Layout: One level  Home Access: Elevator  Bathroom Shower/Tub: Walk-in shower  Bathroom Toilet: Handicap height  Bathroom Equipment: Shower chair, Hand-held shower, Grab bars in shower, Grab bars around toilet  Home Equipment: Cane, Rolling walker, 4 wheeled walker (Has been sleeping in recliner recently.)  ADL Assistance: [address]: Independent (Daughter brings in [address]  Ambulation Assistance: Independent (Uses 3HR but brakes are loose.  Sometimes uses cane.)  Transfer Assistance: Independent  Active : No  Patient's  Info: Daughter drives. Additional Comments: No falls in past 6 months. Daughter reports that pt has been unsteady when walking. Objective          AROM RLE (degrees)  RLE AROM: WFL  AROM LLE (degrees)  LLE AROM : WFL  Strength RLE  Strength RLE: WFL  Strength LLE  Strength LLE: WFL        Bed mobility  Supine to Sit: Minimal assistance  Sit to Supine: Minimal assistance  Scooting: Stand by assistance (to EOB)  Transfers  Sit to Stand: Stand by assistance  Stand to sit: Stand by assistance  Ambulation  Ambulation?: Yes  Ambulation 1  Surface: level tile  Device: Rolling Walker  Assistance: Stand by assistance  Quality of Gait: forward flexed at trunk, slow, short shuffling steps  Distance: 80 ft x 2  Stairs/Curb  Stairs?: No     Balance  Posture: Fair  Sitting - Static: Good  Sitting - Dynamic: Good  Standing - Static: Good; -  Standing - Dynamic: Good; -        Plan   Plan  Times per week: one time only  Safety Devices  Type of devices:  All fall risk precautions in place, Call light within reach, Gait belt, Left in bed, Nurse notified      AM-PAC Score  -PAC Inpatient Mobility Raw Score : 20 (12/06/21 1707)  -PAC Inpatient T-Scale Score : 47.67 (12/06/21 1707)  Mobility Inpatient CMS 0-100% Score: 35.83 (12/06/21 1707)  Mobility Inpatient CMS G-Code Modifier : Asha Dickey (12/06/21 1707)          Goals  Short term goals  Time Frame for Short term goals: 12/6/21  Short term goal 1: Pt will perform bed mobility with Leif ; goal met  Short term goal 2: Pt will perform transfers with SBA; goal met  Short term goal 3: Pt will ambulate 75 ft with RW ans SBA; goal met  Patient Goals   Patient goals : \"to go home\"       Therapy Time   Individual Concurrent Group Co-treatment   Time In 1335         Time Out 1411         Minutes 36         Timed Code Treatment Minutes: 4[address] KUN Mukherjee

## 2021-12-06 NOTE — PROGRESS NOTES
Occupational Therapy   Occupational Therapy Initial Assessment and Treatment Note  Date: 2021   Patient Name: Octavia Bran  MRN: [id number]     : 1930    Date of Service: 2021    Discharge Recommendations:  24 hour supervision or assist     Assessment   Performance deficits / Impairments: Decreased functional mobility ; Decreased ADL status; Decreased balance; Decreased endurance  Assessment: Pt came into ED d/t back pain which is affecting ADls and mobility. Pt lives alone and was independent prior to onset. She tried outpt PT but reports that it increased back pain. During OT session, pt was SBA to occassional CGA for standing balance and transfers with RW. RW was recommended for use at home. (Pt prefers 4WW but was educated that it does not suit her needs right now). Pt requires assist for LE dressing d/t discomfort but completed toileting and grooming with SBA. Daughter plans to provide 24/7 support initially but not cannot provide ongoing 24/7 care. Cont OT in acute care if admitted. Prognosis: Good  Decision Making: Medium Complexity  OT Education: OT Role; Plan of Care; Transfer Training; Equipment; Family Education; ADL Adaptive Strategies  Disease Specific Education: Pt educated on importance of OOB mobility, prevention of complications of bedrest, and general safety during hospitalization. Pt verbalized understanding  REQUIRES OT FOLLOW UP: Yes  Activity Tolerance  Activity Tolerance: Patient Tolerated treatment well  Safety Devices  Safety Devices in place: Yes  Type of devices: Nurse notified; Gait belt; Call light within reach; Left in bed         Patient Diagnosis(es): The encounter diagnosis was Acute exacerbation of chronic low back pain.      has a past medical history of Arthritis, CAD (coronary artery disease), CHF (congestive heart failure) ([address] Depression, Diphtheria, Gout, Hyperlipemia, Hypertension, STEMI (ST elevation myocardial infarction) ([facility] [address] Thyroid disease, and Wears hearing aid. has a past surgical history that includes Hysterectomy; Appendectomy; Tonsillectomy; Colonoscopy (4/29/2013); Breast surgery; Cardiac surgery (2007); joint replacement (Left, 8/13/14); other surgical history (Right, 1/6/14); and Cataract removal with implant (Right, 10/11/2017). Restrictions  Restrictions/Precautions  Restrictions/Precautions: Fall Risk    Subjective   General  Chart Reviewed: Yes  Patient assessed for rehabilitation services?: Yes  Family / Caregiver Present: Yes (daughter)  Referring Practitioner: CHIDI Brownlee  Diagnosis: back pain  Subjective  Subjective: Pt agreeable to OT  General Comment  Comments: PA approved therapy. Pt seen in ED. Patient Currently in Pain: Yes  Pain Assessment  Pain Assessment: 0-10  Pain Level: 4  Pain Type: Acute pain  Pain Location: Back  Pain Orientation: Lower  Non-Pharmaceutical Pain Intervention(s): Ambulation/Increased Activity; Repositioned; Therapeutic presence  Vital Signs  Pulse: 85  Heart Rate Source: Monitor  Resp: 20  BP: 117/67  BP Location: Right upper arm  Patient Position: Supine  Level of Consciousness: Alert (0)  Patient Currently in Pain: Yes  Oxygen Therapy  SpO2: 93 %  Pulse Oximeter Device Mode: Intermittent  Pulse Oximeter Device Location: Finger  O2 Device: None (Room air)  Social/Functional History  Social/Functional History  Lives With: Alone  Type of Home: Apartment  Home Layout: One level  Home Access: Elevator  Bathroom Shower/Tub: Walk-in shower  Bathroom Toilet: Handicap height  Bathroom Equipment: Shower chair, Hand-held shower, Grab bars in shower, Grab bars around toilet  Home Equipment: Cane, Rolling walker, 4 wheeled walker (Has been sleeping in recliner recently.)  ADL Assistance: Independent  Homemaking Assistance: Independent (Daughter brings in groceries.)  Ambulation Assistance: Independent (Uses 6OD but brakes are loose.  Sometimes uses cane.)  Transfer Assistance: Independent  Active : No  Patient's SBA by 12/10  Patient Goals   Patient goals : Frann Grade less pain\"     Therapy Time   Individual Concurrent Group Co-treatment   Time In 1300         Time Out 1334         Minutes 34         Timed Code Treatment Minutes: 24 Minutes (10 min eval)   If pt is discharged prior to next OT session, this note will serve as the discharge summary.    Juan Fischer OT

## 2021-12-06 NOTE — ED NOTES
Bed: 28  Expected date:   Expected time:   Means of arrival: [address] [facility]  Comments:  [facility]  12/06/21 8681

## 2021-12-08 NOTE — ED PROVIDER NOTES
HYSTERECTOMY      JOINT REPLACEMENT Left 8/13/14    LEFT TOTAL KNEE REPLACEMENT WITH FEMORAL NERVE BLOCK FOR PAIN    OTHER SURGICAL HISTORY Right 1/6/14    right total knee replacement    TONSILLECTOMY           CURRENT MEDICATIONS:       Discharge Medication List as of 12/6/2021  2:59 PM      CONTINUE these medications which have NOT CHANGED    Details   guaiFENesin (MUCINEX) 600 MG extended release tablet Take 1 tablet by mouth 2 times daily, Disp-60 tablet, R-1Normal      aspirin 81 MG chewable tablet Take 1 tablet by mouth daily, Disp-30 tablet, R-3Print      metoprolol succinate (TOPROL XL) 50 MG extended release tablet Take 1 tablet by mouth 2 times daily, Disp-90 tablet, R-0Normal      apixaban (ELIQUIS) 2.5 MG TABS tablet Take 1 tablet by mouth 2 times daily, Disp-60 tablet, R-0Normal      diclofenac (VOLTAREN) 25 MG EC tablet Take 1 tablet by mouth 2 times daily, Disp-20 tablet, R-0Print      valsartan (DIOVAN) 40 MG tablet Take 1 tablet by mouth daily, Disp-30 tablet, R-3NO PRINT      lidocaine (LIDODERM) 5 % Place 2 patches onto the skin daily 12 hours on, 12 hours off., Disp-30 patch, R-0NO PRINT      spironolactone (ALDACTONE) 25 MG tablet Take 1 tablet by mouth daily, Disp-30 tablet, R-3NO PRINT      Acetaminophen 325 MG CAPS Take 650 mg by mouth 3 times dailyHistorical Med      hydrocortisone (ANUSOL-HC) 2.5 % rectal cream Place rectally 2 times daily Place rectally 2 times daily. , Rectal, 2 TIMES DAILY, Historical Med      psyllium (KONSYL) 28.3 % PACK Take 1 packet by mouth dailyHistorical Med      NONFORMULARY Historical Med      atorvastatin (LIPITOR) 20 MG tablet Take 20 mg by mouth dailyHistorical Med      tolterodine (DETROL LA) 2 MG extended release capsule Take 2 mg by mouth Daily with supperHistorical Med      methenamine (MANDELAMINE) 1 g tablet Take 1 g by mouth 2 times dailyHistorical Med      Cholecalciferol (VITAMIN D) 2000 units CAPS capsule Take by mouth DailyHistorical Med estradiol (ESTRACE) 0.1 MG/GM vaginal cream Place 1 g vaginally Twice a Week Historical Med      Cyanocobalamin (VITAMIN B 12 PO) Take 1,000 Units by mouth daily       allopurinol (ZYLOPRIM) 100 MG tablet Take 100 mg by mouth daily      docusate sodium (COLACE) 100 MG capsule Take 100 mg by mouth 2 times daily      nitroGLYCERIN (NITROSTAT) 0.4 MG SL tablet Place 0.4 mg under the tongue every 5 minutes as needed (never  used). meclizine (ANTIVERT) 25 MG CHEW Take 25 mg by mouth 3 times daily as needed (not used recently). levothyroxine (SYNTHROID) 100 MCG tablet Take 100 mcg by mouth daily. ALLERGIES:    Ace inhibitors, Amoxicillin, Bactrim [sulfamethoxazole-trimethoprim], Ciprofloxacin, Clindamycin/lincomycin, Naproxen, Statins, Hydrochlorothiazide, and Sulfa antibiotics    FAMILY HISTORY:       Family History   Problem Relation Age of Onset    Cancer Brother         colon          SOCIAL HISTORY:     Social History     Socioeconomic History    Marital status:       Spouse name: Not on file    Number of children: Not on file    Years of education: Not on file    Highest education level: Not on file   Occupational History    Not on file   Tobacco Use    Smoking status: Former Smoker     Packs/day: 0.50     Years: 40.00     Pack years: 20.00     Types: Cigarettes     Quit date: 2000     Years since quittin.3    Smokeless tobacco: Never Used    Tobacco comment: less than pack day   Substance and Sexual Activity    Alcohol use: No    Drug use: No    Sexual activity: Not on file   Other Topics Concern    Not on file   Social History Narrative    Not on file     Social Determinants of Health     Financial Resource Strain:     Difficulty of Paying Living Expenses: Not on file   Food Insecurity:     Worried About [address] in the Last Year: Not on file    [place] of Food in the Last Year: Not on file   Transportation Needs:     Lack of Transportation (Medical): Not on file    Lack of Transportation (Non-Medical): Not on file   Physical Activity:     Days of Exercise per Week: Not on file    Minutes of Exercise per Session: Not on file   Stress:     Feeling of Stress : Not on file   Social Connections:     Frequency of Communication with Friends and Family: Not on file    Frequency of Social Gatherings with Friends and Family: Not on file    Attends Restoration Services: Not on file    Active Member of [address] [place] [address] [employer] or Organizations: Not on file    Attends Club or Organization Meetings: Not on file    Marital Status: Not on file   Intimate Partner Violence:     Fear of Current or Ex-Partner: Not on file    Emotionally Abused: Not on file    Physically Abused: Not on file    Sexually Abused: Not on file   Housing Stability:     Unable to Pay for Housing in the Last Year: Not on file    Number of [place] in the Last Year: Not on file    Unstable Housing in the Last Year: Not on file       SCREENINGS:             PHYSICAL EXAM:       ED Triage Vitals   BP Temp Temp Source Pulse Resp SpO2 Height Weight   12/06/21 0921 12/06/21 0921 12/06/21 0921 12/06/21 0921 12/06/21 0921 12/06/21 0921 12/06/21 0918 12/06/21 0918   129/86 97.8 °F (36.6 °C) Oral 70 20 96 % 5' 1\" (1.549 m) 180 lb (81.6 kg)       Physical Exam    CONSTITUTIONAL: Awake and alert. Cooperative. Well-developed. Well-nourished. Vitals:    12/06/21 1157 12/06/21 1301 12/06/21 1345 12/06/21 1448   BP: (!) 141/84 (!) 147/104 (!) 132/92 117/67   Pulse: 86 83 84 85   Resp: 20 20  20   Temp:       TempSrc:       SpO2: 95% 94% 93% 93%   Weight:       Height:         HENT: Normocephalic. Atraumatic. External ears normal, without discharge. TMs clear bilaterally. Nonasal discharge. Oropharynx clear, no erythema. Mucous membranes moist.  EYES: Conjunctiva non-injected, nolid abnormalities noted. No scleral icterus. PERRL. EOM's grossly intact. Anterior chambers clear. NECK: Supple. Normal ROM.  No meningismus. No thyroid tenderness or swelling noted. CARDIOVASCULAR: RRR. No Murmer. No carotid bruits. PULMONARY/CHEST WALL: Effort normal. No tachypnea. Lungs clear to ausculation. ABDOMEN: Normal BS. Soft. Nondistended. No tenderness to palpation. No guarding. No hernias noted. No splenomegaly. Back: Spine is midline. No ecchymosis. No crepituson palpation. No obvious subluxation of vertebral column. No saddle anesthesia or evidence of cauda equina. /ANORECTAL: Not assessed  MUSKULOSKELETAL: Normal ROM. No acute deformities. No edema. No tenderness to palpate. SKIN: Warm and dry. NEUROLOGICAL:  GCS 15. CN II-XII grossly intact. Strength is 5/5 in all extremities and sensation is intact. PSYCHIATRIC: Normal affect, normal insight and judgement. Alert and oriented x 3. DIAGNOSTIC RESULTS:     LABS:    No results found for this visit on 12/06/21. RADIOLOGY:  All x-ray studies are viewed/reviewed by me. Formal interpretations per the radiologist are as follows:      CT LUMBAR SPINE WO CONTRAST   Final Result   No acute fracture or destructive bony abnormality. Canal stenosis L4-L5 progressed compared to 2018                 EKG:  See EKG interpretation by an attending physician. PROCEDURES:   N/A    CRITICAL CARE TIME:   N/A    CONSULTS:  None      EMERGENCY DEPARTMENT COURSE andDIFFERENTIAL DIAGNOSIS/MDM:   Vitals:    Vitals:    12/06/21 1157 12/06/21 1301 12/06/21 1345 12/06/21 1448   BP: (!) 141/84 (!) 147/104 (!) 132/92 117/67   Pulse: 86 83 84 85   Resp: 20 20  20   Temp:       TempSrc:       SpO2: 95% 94% 93% 93%   Weight:       Height:           Patient wasgiven the following medications:  Medications   orphenadrine (NORFLEX) injection 60 mg (60 mg IntraMUSCular Given 12/6/21 1228)   predniSONE (DELTASONE) tablet 40 mg (40 mg Oral Given 12/6/21 1507)         Patient was evaluated independently by myself with the attending physician available for consultation.   Patient presented to the emergency department today with complaints of low back pain is been going for last several months, no trauma. Patient requesting a referral to Ortho. Patient had no focal bony midline pain but I did do a CT of her L-spine which showed canal stenosis at L4 and L5 which is progressed compared to imaging from 2018. Patient was given muscle relaxer here with complete resolution of her discomfort she was evaluated by physical therapy who did feel she was safe to go home, I discussed with family and patient who was agreeable with plan for follow-up as an outpatient. Discharged home in good condition. Patient laboratory studies, radiographic imaging, and assessment were all discussed with the patient and/orpatient family. There was shared decision-making between myself as well as the patient and/or their surrogate and we are all in agreement with discharge home. There was an opportunity for questions and all questions were answered tothe best of my ability and to the satisfaction of the patient and/or patient family. FINAL IMPRESSION:      1.  Acute exacerbation of chronic low back pain          DISPOSITION/PLAN:   DISPOSITION Decision To Discharge      PATIENT REFERRED TO:  Galo Glass MD  [address] [facility]  [phone]    Schedule an appointment as soon as possible for a visit   For follow up    [facility]  ED  [address] [facility] [address]  Go to   If symptoms worsen      DISCHARGE MEDICATIONS:  Discharge Medication List as of 12/6/2021  2:59 PM      START taking these medications    Details   predniSONE (DELTASONE) 10 MG tablet Take 4 tablets by mouth once daily for 5 days, Disp-20 tablet, R-0Print      cyclobenzaprine (FLEXERIL) 10 MG tablet Take 1 tablet by mouth 3 times daily as needed for Muscle spasms, Disp-21 tablet, R-0Print                        (Please note thatportions of this note were completed with a voice recognition program.  Efforts were made to edit the dictations, but occasionally words are mis-transcribed.)    ROBERT Lau - CNP-C (electronicallysigned)        ROBERT Lau CNP  12/08/21 9698

## 2021-12-16 ENCOUNTER — OFFICE VISIT (OUTPATIENT)
Dept: ORTHOPEDIC SURGERY | Age: 86
End: 2021-12-16
Payer: MEDICARE

## 2021-12-16 VITALS — BODY MASS INDEX: 33.99 KG/M2 | WEIGHT: 180 LBS | HEIGHT: 61 IN

## 2021-12-16 DIAGNOSIS — M48.061 SPINAL STENOSIS AT L4-L5 LEVEL: Primary | ICD-10-CM

## 2021-12-16 PROCEDURE — 1123F ACP DISCUSS/DSCN MKR DOCD: CPT | Performed by: ORTHOPAEDIC SURGERY

## 2021-12-16 PROCEDURE — 4040F PNEUMOC VAC/ADMIN/RCVD: CPT | Performed by: ORTHOPAEDIC SURGERY

## 2021-12-16 PROCEDURE — 1090F PRES/ABSN URINE INCON ASSESS: CPT | Performed by: ORTHOPAEDIC SURGERY

## 2021-12-16 PROCEDURE — G8419 CALC BMI OUT NRM PARAM NOF/U: HCPCS | Performed by: ORTHOPAEDIC SURGERY

## 2021-12-16 PROCEDURE — 99203 OFFICE O/P NEW LOW 30 MIN: CPT | Performed by: ORTHOPAEDIC SURGERY

## 2021-12-16 PROCEDURE — G8484 FLU IMMUNIZE NO ADMIN: HCPCS | Performed by: ORTHOPAEDIC SURGERY

## 2021-12-16 PROCEDURE — G8427 DOCREV CUR MEDS BY ELIG CLIN: HCPCS | Performed by: ORTHOPAEDIC SURGERY

## 2021-12-16 PROCEDURE — 1036F TOBACCO NON-USER: CPT | Performed by: ORTHOPAEDIC SURGERY

## 2021-12-16 NOTE — PROGRESS NOTES
New Patient: LUMBAR SPINE    Referring Provider:  Referral, Self    CHIEF COMPLAINT:    Chief Complaint   Patient presents with    Back Pain     Patient referred by ED. Patient states that since going to the ED, her back pain has gotten better. Patient states that she has weakness and \"shaky\". She had 1 round of oral steroids. HISTORY OF PRESENT ILLNESS:    Ms. Derrell Hernández  is a pleasant 80 y.o. presents today for the evaluation of low back and bilateral leg pain. Her symptoms began about 2 months ago. They have completely resolved after recent visit goals of to the emergency room and a course of oral steroids. Prior to that she noticed numbness and tingling in her legs as well. She denies saddle anesthesia or bowel bladder abnormality.     Current/Past Treatment:   · Physical Therapy: None  · Chiropractic: None  · Injection: None  · Medications: Oral steroids    Past Medical History:   Past Medical History:   Diagnosis Date    Arthritis     CAD (coronary artery disease)     stent placed 2007    [facility]est[facility]) ([facility] [address]     Depression 1980    treated with electoshock successfully    [facility]     age 15   [facility]     Hyperlipemia     Hypertension     STEMI (ST elevation myocardial infarction) ([facility] [address] 2007    Thyroid disease     Wears hearing aid     left ear      Past Surgical History:     Past Surgical History:   Procedure Laterality Date    APPENDECTOMY      BREAST SURGERY      biopsy benign    CARDIAC SURGERY  2007    stent    CATARACT REMOVAL WITH IMPLANT Right 10/11/2017    COLONOSCOPY  4/29/2013    HYSTERECTOMY      JOINT REPLACEMENT Left 8/13/14    LEFT TOTAL KNEE REPLACEMENT WITH FEMORAL NERVE BLOCK FOR PAIN    OTHER SURGICAL HISTORY Right 1/6/14    right total knee replacement    TONSILLECTOMY       Current Medications:     Current Outpatient Medications:     predniSONE (DELTASONE) 10 MG tablet, Take 4 tablets by mouth once daily for 5 days, Disp: 20 tablet, Rfl: 0    cyclobenzaprine (FLEXERIL) 10 MG tablet, Take 1 tablet by mouth 3 times daily as needed for Muscle spasms, Disp: 21 tablet, Rfl: 0    guaiFENesin (MUCINEX) 600 MG extended release tablet, Take 1 tablet by mouth 2 times daily, Disp: 60 tablet, Rfl: 1    aspirin 81 MG chewable tablet, Take 1 tablet by mouth daily, Disp: 30 tablet, Rfl: 3    metoprolol succinate (TOPROL XL) 50 MG extended release tablet, Take 1 tablet by mouth 2 times daily, Disp: 90 tablet, Rfl: 0    apixaban (ELIQUIS) 2.5 MG TABS tablet, Take 1 tablet by mouth 2 times daily, Disp: 60 tablet, Rfl: 0    diclofenac (VOLTAREN) 25 MG EC tablet, Take 1 tablet by mouth 2 times daily, Disp: 20 tablet, Rfl: 0    valsartan (DIOVAN) 40 MG tablet, Take 1 tablet by mouth daily, Disp: 30 tablet, Rfl: 3    lidocaine (LIDODERM) 5 %, Place 2 patches onto the skin daily 12 hours on, 12 hours off., Disp: 30 patch, Rfl: 0    spironolactone (ALDACTONE) 25 MG tablet, Take 1 tablet by mouth daily, Disp: 30 tablet, Rfl: 3    Acetaminophen 325 MG CAPS, Take 650 mg by mouth 3 times daily, Disp: , Rfl:     hydrocortisone (ANUSOL-HC) 2.5 % rectal cream, Place rectally 2 times daily Place rectally 2 times daily. , Disp: , Rfl:     psyllium (KONSYL) 28.3 % PACK, Take 1 packet by mouth daily, Disp: , Rfl:     NONFORMULARY, , Disp: , Rfl:     atorvastatin (LIPITOR) 20 MG tablet, Take 20 mg by mouth daily, Disp: , Rfl:     tolterodine (DETROL LA) 2 MG extended release capsule, Take 2 mg by mouth Daily with supper, Disp: , Rfl:     methenamine (MANDELAMINE) 1 g tablet, Take 1 g by mouth 2 times daily, Disp: , Rfl:     Cholecalciferol (VITAMIN D) 2000 units CAPS capsule, Take by mouth Daily, Disp: , Rfl:     estradiol (ESTRACE) 0.1 MG/GM vaginal cream, Place 1 g vaginally Twice a Week , Disp: , Rfl:     Cyanocobalamin (VITAMIN B 12 PO), Take 1,000 Units by mouth daily , Disp: , Rfl:     allopurinol (ZYLOPRIM) 100 MG tablet, Take 100 mg by mouth daily, Disp: , Rfl:     docusate sodium (COLACE) 100 MG capsule, Take 100 mg by mouth 2 times daily, Disp: , Rfl:     nitroGLYCERIN (NITROSTAT) 0.4 MG SL tablet, Place 0.4 mg under the tongue every 5 minutes as needed (never  used). , Disp: , Rfl:     meclizine (ANTIVERT) 25 MG CHEW, Take 25 mg by mouth 3 times daily as needed (not used recently). , Disp: , Rfl:     levothyroxine (SYNTHROID) 100 MCG tablet, Take 100 mcg by mouth daily. , Disp: , Rfl:   Allergies:  Ace inhibitors, Amoxicillin, Bactrim [sulfamethoxazole-trimethoprim], Ciprofloxacin, Clindamycin/lincomycin, Naproxen, Statins, Hydrochlorothiazide, and Sulfa antibiotics  Social History:    reports that she quit smoking about 21 years ago. Her smoking use included cigarettes. She has a 20.00 pack-year smoking history. She has never used smokeless tobacco. She reports that she does not drink alcohol and does not use drugs. Family History:   Family History   Problem Relation Age of Onset    Cancer Brother         colon       REVIEW OF SYSTEMS: Full ROS noted & scanned   CONSTITUTIONAL: Denies unexplained weight loss, fevers, chills or fatigue  NEUROLOGICAL: Denies unsteady gait or progressive weakness  MUSCULOSKELETAL: Denies joint swelling or redness  PSYCHOLOGICAL: Denies anxiety, depression   SKIN: Denies skin changes, delayed healing, rash, itching   HEMATOLOGIC: Denies easy bleeding or bruising  ENDOCRINE: Denies excessive thirst, urination, heat/cold  RESPIRATORY: Denies current dyspnea, cough  GI: Denies nausea, vomiting, diarrhea   : Denies bowel or bladder issues      PHYSICAL EXAM:    Vitals: Height 5' 1\" (1.549 m), weight 180 lb (81.6 kg). GENERAL EXAM:  · General Apparence: Patient is adequately groomed with no evidence of malnutrition. · Orientation: The patient is oriented to time, place and person. · Mood & Affect:The patient's mood and affect are appropriate.   · Vascular: Examination reveals no swelling tenderness in upper or lower extremities. Good capillary refill. · Lymphatic: The lymphatic examination bilaterally reveals all areas to be without enlargement or induration  · Sensation: Sensation is intact without deficit  · Coordination/Balance: Good coordination     LUMBAR/SACRAL EXAMINATION:  · Inspection: Local inspection shows no step-off or bruising. Lumbar alignment is normal.  Sagittal and Coronal balance is neutral.      · Palpation:   No evidence of tenderness at the midline. No tenderness bilaterally at the paraspinal or trochanters. There is no step-off or paraspinal spasm. · Range of Motion: Lumbar flexion, extension and rotation are mildly limited due to pain. · Strength:   Strength testing is 5/5 in all muscle groups tested. · Special Tests:   Straight leg raise and crossed SLR negative. Leg length and pelvis level. · Skin: There are no rashes, ulcerations or lesions. · Reflexes: Reflexes are symmetrically 2+ at the patellar and ankle tendons. Clonus absent bilaterally at the feet. · Gait & station: normal, patient ambulates without assistance    · Additional Examinations:   · RIGHT LOWER EXTREMITY: Inspection/examination of the right lower extremity does not show any tenderness, deformity or injury. Range of motion is unremarkable. There is no gross instability. There are no rashes, ulcerations or lesions. Strength and tone are normal.    · LEFT LOWER EXTREMITY:  Inspection/examination of the left lower extremity does not show any tenderness, deformity or injury. Range of motion is unremarkable. There is no gross instability. There are no rashes, ulcerations or lesions. Strength and tone are normal.    Diagnostic Testing:    I reviewed CT images of her lumbar spine from 12/6/2021 in the office today.   Those show at least moderate central stenosis L4-L5    Impression:   Spinal stenosis with neurogenic claudication    Plan:    Discussed treatment options including observation, physical therapy, epidural injection, and additional imaging. She would like to proceed with observation.   She may call to schedule physical therapy for lumbar stenosis

## 2021-12-20 ENCOUNTER — HOSPITAL ENCOUNTER (EMERGENCY)
Age: 86
Discharge: HOME | End: 2021-12-20
Attending: STUDENT IN AN ORGANIZED HEALTH CARE EDUCATION/TRAINING PROGRAM
Payer: MEDICARE

## 2021-12-20 ENCOUNTER — APPOINTMENT (OUTPATIENT)
Dept: CT IMAGING | Age: 86
End: 2021-12-20
Payer: MEDICARE

## 2021-12-20 VITALS
HEIGHT: 61 IN | OXYGEN SATURATION: 92 % | SYSTOLIC BLOOD PRESSURE: 135 MMHG | DIASTOLIC BLOOD PRESSURE: 77 MMHG | WEIGHT: 178 LBS | BODY MASS INDEX: 33.61 KG/M2 | TEMPERATURE: 98.2 F | RESPIRATION RATE: 18 BRPM | HEART RATE: 94 BPM

## 2021-12-20 DIAGNOSIS — S09.90XA CLOSED HEAD INJURY, INITIAL ENCOUNTER: Primary | ICD-10-CM

## 2021-12-20 PROCEDURE — 70450 CT HEAD/BRAIN W/O DYE: CPT

## 2021-12-20 PROCEDURE — 99284 EMERGENCY DEPT VISIT MOD MDM: CPT

## 2021-12-20 PROCEDURE — 6370000000 HC RX 637 (ALT 250 FOR IP): Performed by: STUDENT IN AN ORGANIZED HEALTH CARE EDUCATION/TRAINING PROGRAM

## 2021-12-20 PROCEDURE — 72125 CT NECK SPINE W/O DYE: CPT

## 2021-12-20 RX ADMIN — METOPROLOL TARTRATE 25 MG: 25 TABLET, FILM COATED ORAL at 01:49

## 2021-12-20 NOTE — ED NOTES
Reviewed discharge instructions including when to come back and follow up appointments  Pt and daughter verbalized understanding.      Raji De Leon RN  12/20/21 9298

## 2021-12-20 NOTE — ED PROVIDER NOTES
[address] [facility] [place]  ED      CHIEF COMPLAINT  Fall (pt to ED via EMS from home with c/o fall from recliner chair. originally called for lift assist, pt unable to get up. pt reports left shoulder hurt initially but has no complaints now. pt denies any dizziness, chest pain, SOB)       HISTORY OF PRESENT ILLNESS  Kamala Rust is a 80 y.o. female  who presents to the ED complaining of Fall from her new recliner. Patient states that she was attempting to put her feet down, when the recliner flipped with her underneath. She was unable to get up and called her daughter, who called EMS to assist the patient in getting up. Patient initially had shoulder pain, but states this has essentially resolved. She currently has no complaints. Denies loss of consciousness, denies nausea, denies change in vision. No other complaints, modifying factors or associated symptoms. I have reviewed the following from the nursing documentation. Past Medical History:   Diagnosis Date    Arthritis     CAD (coronary artery disease)     stent placed 2007    CHF (congestive heart failure) ([address]     Depression 1980    treated with electoshock successfully    Diphtheria     age 15   [place] Fanny Gout     Hyperlipemia     Hypertension     STEMI (ST elevation myocardial infarction) ([address] 2007    Thyroid disease     Wears hearing aid     left ear     Past Surgical History:   Procedure Laterality Date    APPENDECTOMY      BREAST SURGERY      biopsy benign    CARDIAC SURGERY  2007    stent    CATARACT REMOVAL WITH IMPLANT Right 10/11/2017    COLONOSCOPY  4/29/2013    HYSTERECTOMY      JOINT REPLACEMENT Left 8/13/14    LEFT TOTAL KNEE REPLACEMENT WITH FEMORAL NERVE BLOCK FOR PAIN    OTHER SURGICAL HISTORY Right 1/6/14    right total knee replacement    TONSILLECTOMY       Family History   Problem Relation Age of Onset    Cancer Brother         colon     Social History     Socioeconomic History    Marital status:   Spouse name: Not on file    Number of children: Not on file    Years of education: Not on file    Highest education level: Not on file   Occupational History    Not on file   Tobacco Use    Smoking status: Former Smoker     Packs/day: 0.50     Years: 40.00     Pack years: 20.00     Types: Cigarettes     Quit date: 2000     Years since quittin.4    Smokeless tobacco: Never Used    Tobacco comment: less than pack day   Substance and Sexual Activity    Alcohol use: No    Drug use: No    Sexual activity: Not on file   Other Topics Concern    Not on file   Social History Narrative    Not on file     Social Determinants of Health     Financial Resource Strain:     Difficulty of Paying Living Expenses: Not on file   Food Insecurity:     Worried About [employer] in the Last Year: Not on file    [place] of Food in the Last Year: Not on file   Transportation Needs:     Lack of Transportation (Medical): Not on file    Lack of Transportation (Non-Medical):  Not on file   Physical Activity:     Days of Exercise per Week: Not on file    Minutes of Exercise per Session: Not on file   Stress:     Feeling of Stress : Not on file   Social Connections:     Frequency of Communication with Friends and Family: Not on file    Frequency of Social Gatherings with Friends and Family: Not on file    Attends Yarsani Services: Not on file    Active Member of [address] [place] [address] or Organizations: Not on file    Attends Club or Organization Meetings: Not on file    Marital Status: Not on file   Intimate Partner Violence:     Fear of Current or Ex-Partner: Not on file    Emotionally Abused: Not on file    Physically Abused: Not on file    Sexually Abused: Not on file   Housing Stability:     Unable to Pay for Housing in the Last Year: Not on file    Number of [place] in the Last Year: Not on file    Unstable Housing in the Last Year: Not on file     No current facility-administered medications for this encounter. Current Outpatient Medications   Medication Sig Dispense Refill    guaiFENesin (MUCINEX) 600 MG extended release tablet Take 1 tablet by mouth 2 times daily 60 tablet 1    aspirin 81 MG chewable tablet Take 1 tablet by mouth daily 30 tablet 3    metoprolol succinate (TOPROL XL) 50 MG extended release tablet Take 1 tablet by mouth 2 times daily 90 tablet 0    apixaban (ELIQUIS) 2.5 MG TABS tablet Take 1 tablet by mouth 2 times daily 60 tablet 0    valsartan (DIOVAN) 40 MG tablet Take 1 tablet by mouth daily 30 tablet 3    spironolactone (ALDACTONE) 25 MG tablet Take 1 tablet by mouth daily 30 tablet 3    Acetaminophen 325 MG CAPS Take 650 mg by mouth 3 times daily      atorvastatin (LIPITOR) 20 MG tablet Take 20 mg by mouth daily      tolterodine (DETROL LA) 2 MG extended release capsule Take 2 mg by mouth Daily with supper      methenamine (MANDELAMINE) 1 g tablet Take 1 g by mouth 2 times daily      Cholecalciferol (VITAMIN D) 2000 units CAPS capsule Take by mouth Daily      allopurinol (ZYLOPRIM) 100 MG tablet Take 100 mg by mouth daily      nitroGLYCERIN (NITROSTAT) 0.4 MG SL tablet Place 0.4 mg under the tongue every 5 minutes as needed (never  used).  meclizine (ANTIVERT) 25 MG CHEW Take 25 mg by mouth 3 times daily as needed (not used recently).  levothyroxine (SYNTHROID) 100 MCG tablet Take 100 mcg by mouth daily.  lidocaine (LIDODERM) 5 % Place 2 patches onto the skin daily 12 hours on, 12 hours off. 30 patch 0    hydrocortisone (ANUSOL-HC) 2.5 % rectal cream Place rectally 2 times daily Place rectally 2 times daily.       psyllium (KONSYL) 28.3 % PACK Take 1 packet by mouth daily      NONFORMULARY       Cyanocobalamin (VITAMIN B 12 PO) Take 1,000 Units by mouth daily       docusate sodium (COLACE) 100 MG capsule Take 100 mg by mouth 2 times daily       Allergies   Allergen Reactions    Ace Inhibitors Other (See Comments)     cough    Amoxicillin Hives Hives    Bactrim [Sulfamethoxazole-Trimethoprim]     Ciprofloxacin     Clindamycin/Lincomycin     Naproxen Nausea Only     Nausea and stomach pain    Statins Other (See Comments)     myalgia  unknown    Hydrochlorothiazide Rash    Sulfa Antibiotics Rash     rash  Blistering rash       REVIEW OF SYSTEMS  10 systems reviewed, pertinent positives per HPI otherwise noted to be negative. PHYSICAL EXAM  /77   Pulse 94   Temp 98.2 °F (36.8 °C) (Oral)   Resp 18   Ht 5' 1\" (1.549 m)   Wt 178 lb (80.7 kg)   SpO2 92%   BMI 33.63 kg/m²    General: Appears well. Alert  HEENT: Head atraumatic, right-sided subconjunctival hemorrhage, PERRL. Normal ENT inspection, no hemotympanum, pharynx normal. No signs of dehydration  NECK: Normal inspection  RESPIRATORY: Normal breath sounds. No chest wall tenderness. No respiratory distress  CVS: Heart rate and rhythm irregular. No Murmurs  ABDOMEN/GI: Soft, Non-tender, No distention  BACK: Normal inspection  EXTREMITIES: Non-Tender. Full ROM. Normal appearance. No Pedal edema  NEURO: Alert and oriented. Sensation normal. Motor normal  PSYCH: Mood normal. Affect normal.  SKIN: Color normal. No rash. Warm, Dry    RADIOLOGY  CT Head WO Contrast   Final Result   No acute intracranial abnormality. RECOMMENDATIONS:   Unavailable         CT Cervical Spine WO Contrast   Final Result   No acute abnormality of the cervical spine. RECOMMENDATIONS:   Unavailable           ED COURSE/MDM  Patient seen and evaluated. Old records reviewed. Labs and imaging reviewed and results discussed with patient. Presenting after fall out of recliner. She is on Eliquis for A. fib. CT head neck shows no acute process. Due to mildly elevated heart rates she is given a single dose of 25 mg p.o. metoprolol with improvement. She is discharged home with her daughter with return precautions and follow-up to her PCP.       During the patient's ED course, the patient was given: Medications   metoprolol tartrate (LOPRESSOR) tablet 25 mg (25 mg Oral Given 12/20/21 4747)        CLINICAL IMPRESSION  1. Closed head injury, initial encounter        Blood pressure 135/77, pulse 94, temperature 98.2 °F (36.8 °C), temperature source Oral, resp. rate 18, height 5' 1\" (1.549 m), weight 178 lb (80.7 kg), SpO2 92 %. Karsten Youssef was discharged to home in stable condition. Patient was given scripts for the following medications. I counseled patient how to take these medications. New Prescriptions    No medications on file       Follow-up with:  Faith Bachelor Chick  [address] [facility] [facility] [address]  [phone]    Call in 1 day        DISCLAIMER: This chart was created using Dragon dictation software. Efforts were made by me to ensure accuracy, however some errors may be present due to limitations of this technology and occasionally words are not transcribed correctly.        Phill Loepz, DO  12/20/21 0520

## 2022-11-27 ENCOUNTER — HOSPITAL ENCOUNTER (OUTPATIENT)
Age: 87
Setting detail: OBSERVATION
Discharge: HOME | End: 2022-11-29
Attending: INTERNAL MEDICINE | Admitting: INTERNAL MEDICINE
Payer: MEDICARE

## 2022-11-27 ENCOUNTER — APPOINTMENT (OUTPATIENT)
Dept: GENERAL RADIOLOGY | Age: 87
End: 2022-11-27
Payer: MEDICARE

## 2022-11-27 ENCOUNTER — APPOINTMENT (OUTPATIENT)
Dept: CT IMAGING | Age: 87
End: 2022-11-27
Payer: MEDICARE

## 2022-11-27 DIAGNOSIS — G44.051 SHORT LASTING UNILATERAL NEURALGIFORM HEADACHE WITH CONJUNCTIVAL INJECTION AND TEARING (SUNCT), INTRACTABLE: Primary | ICD-10-CM

## 2022-11-27 DIAGNOSIS — I48.91 NEW ONSET A-FIB (HCC): ICD-10-CM

## 2022-11-27 LAB
A/G RATIO: 0.9 (ref 1.1–2.2)
ALBUMIN SERPL-MCNC: 4 G/DL (ref 3.4–5)
ALP BLD-CCNC: 77 U/L (ref 40–129)
ALT SERPL-CCNC: 14 U/L (ref 10–40)
ANION GAP SERPL CALCULATED.3IONS-SCNC: 11 MMOL/L (ref 3–16)
AST SERPL-CCNC: 19 U/L (ref 15–37)
BASOPHILS ABSOLUTE: 0.1 K/UL (ref 0–0.2)
BASOPHILS RELATIVE PERCENT: 0.5 %
BILIRUB SERPL-MCNC: 0.6 MG/DL (ref 0–1)
BILIRUBIN URINE: NEGATIVE
BLOOD, URINE: NEGATIVE
BUN BLDV-MCNC: 41 MG/DL (ref 7–20)
CALCIUM SERPL-MCNC: 9.9 MG/DL (ref 8.3–10.6)
CHLORIDE BLD-SCNC: 98 MMOL/L (ref 99–110)
CLARITY: CLEAR
CO2: 21 MMOL/L (ref 21–32)
COLOR: YELLOW
CREAT SERPL-MCNC: 1.4 MG/DL (ref 0.6–1.2)
EOSINOPHILS ABSOLUTE: 0 K/UL (ref 0–0.6)
EOSINOPHILS RELATIVE PERCENT: 0.2 %
GFR SERPL CREATININE-BSD FRML MDRD: 35 ML/MIN/{1.73_M2}
GLUCOSE BLD-MCNC: 117 MG/DL (ref 70–99)
GLUCOSE URINE: NEGATIVE MG/DL
HCT VFR BLD CALC: 39.3 % (ref 36–48)
HEMOGLOBIN: 13 G/DL (ref 12–16)
KETONES, URINE: NEGATIVE MG/DL
LEUKOCYTE ESTERASE, URINE: ABNORMAL
LYMPHOCYTES ABSOLUTE: 1.7 K/UL (ref 1–5.1)
LYMPHOCYTES RELATIVE PERCENT: 16.5 %
MCH RBC QN AUTO: 31.5 PG (ref 26–34)
MCHC RBC AUTO-ENTMCNC: 33 G/DL (ref 31–36)
MCV RBC AUTO: 95.6 FL (ref 80–100)
MICROSCOPIC EXAMINATION: YES
MONOCYTES ABSOLUTE: 1.2 K/UL (ref 0–1.3)
MONOCYTES RELATIVE PERCENT: 11.6 %
NEUTROPHILS ABSOLUTE: 7.5 K/UL (ref 1.7–7.7)
NEUTROPHILS RELATIVE PERCENT: 71.2 %
NITRITE, URINE: NEGATIVE
PDW BLD-RTO: 13.9 % (ref 12.4–15.4)
PH UA: 6 (ref 5–8)
PLATELET # BLD: 139 K/UL (ref 135–450)
PMV BLD AUTO: 10.1 FL (ref 5–10.5)
POTASSIUM REFLEX MAGNESIUM: 4.6 MMOL/L (ref 3.5–5.1)
PROTEIN UA: NEGATIVE MG/DL
RBC # BLD: 4.11 M/UL (ref 4–5.2)
RBC UA: NORMAL /HPF (ref 0–4)
SODIUM BLD-SCNC: 130 MMOL/L (ref 136–145)
SPECIFIC GRAVITY UA: 1.01 (ref 1–1.03)
TOTAL PROTEIN: 8.7 G/DL (ref 6.4–8.2)
TROPONIN: <0.01 NG/ML
URINE REFLEX TO CULTURE: ABNORMAL
URINE TYPE: ABNORMAL
UROBILINOGEN, URINE: 0.2 E.U./DL
WBC # BLD: 10.5 K/UL (ref 4–11)
WBC UA: NORMAL /HPF (ref 0–5)

## 2022-11-27 PROCEDURE — 93005 ELECTROCARDIOGRAM TRACING: CPT | Performed by: NURSE PRACTITIONER

## 2022-11-27 PROCEDURE — 70450 CT HEAD/BRAIN W/O DYE: CPT

## 2022-11-27 PROCEDURE — 80053 COMPREHEN METABOLIC PANEL: CPT

## 2022-11-27 PROCEDURE — 6360000004 HC RX CONTRAST MEDICATION: Performed by: NURSE PRACTITIONER

## 2022-11-27 PROCEDURE — 81001 URINALYSIS AUTO W/SCOPE: CPT

## 2022-11-27 PROCEDURE — 84484 ASSAY OF TROPONIN QUANT: CPT

## 2022-11-27 PROCEDURE — 71045 X-RAY EXAM CHEST 1 VIEW: CPT

## 2022-11-27 PROCEDURE — 99285 EMERGENCY DEPT VISIT HI MDM: CPT

## 2022-11-27 PROCEDURE — 70498 CT ANGIOGRAPHY NECK: CPT

## 2022-11-27 PROCEDURE — 2580000003 HC RX 258: Performed by: NURSE PRACTITIONER

## 2022-11-27 PROCEDURE — 6360000002 HC RX W HCPCS: Performed by: NURSE PRACTITIONER

## 2022-11-27 PROCEDURE — 96374 THER/PROPH/DIAG INJ IV PUSH: CPT

## 2022-11-27 PROCEDURE — 85025 COMPLETE CBC W/AUTO DIFF WBC: CPT

## 2022-11-27 RX ORDER — ACETAMINOPHEN 325 MG/1
650 TABLET ORAL ONCE
Status: DISCONTINUED | OUTPATIENT
Start: 2022-11-27 | End: 2022-11-29 | Stop reason: HOSPADM

## 2022-11-27 RX ORDER — DIAZEPAM 5 MG/ML
2.5 INJECTION, SOLUTION INTRAMUSCULAR; INTRAVENOUS ONCE
Status: COMPLETED | OUTPATIENT
Start: 2022-11-27 | End: 2022-11-27

## 2022-11-27 RX ADMIN — LIDOCAINE HYDROCHLORIDE 76.2 MG: 20 INJECTION, SOLUTION INTRAVENOUS at 22:12

## 2022-11-27 RX ADMIN — DIAZEPAM 2.5 MG: 5 INJECTION, SOLUTION INTRAMUSCULAR; INTRAVENOUS at 20:47

## 2022-11-27 RX ADMIN — IOPAMIDOL 75 ML: 755 INJECTION, SOLUTION INTRAVENOUS at 19:18

## 2022-11-27 ASSESSMENT — ENCOUNTER SYMPTOMS
EYE DISCHARGE: 0
RHINORRHEA: 0
COUGH: 0
ABDOMINAL PAIN: 0
BACK PAIN: 0
SORE THROAT: 0
NAUSEA: 0
DIARRHEA: 0
COLOR CHANGE: 0
SINUS PAIN: 0
PHOTOPHOBIA: 0
WHEEZING: 0
VOMITING: 0
SHORTNESS OF BREATH: 0
EYE REDNESS: 0

## 2022-11-27 ASSESSMENT — PAIN - FUNCTIONAL ASSESSMENT: PAIN_FUNCTIONAL_ASSESSMENT: 0-10

## 2022-11-27 ASSESSMENT — PAIN SCALES - GENERAL: PAINLEVEL_OUTOF10: 10

## 2022-11-27 NOTE — ED PROVIDER NOTES
**ADVANCED PRACTICE PROVIDER, I HAVE EVALUATED THIS [facility]  ED  EMERGENCY DEPARTMENT ENCOUNTER      Pt Name: Emy Breaux  KMU:[id number]  [place] 8/25/1930  Date of evaluation: 11/27/2022  Provider: ROBERT Cruz CNP  Note Started: 5:51 PM EST 11/28/2022        Chief Complaint:    Chief Complaint   Patient presents with    Headache     For three days yesterday was having a nosebleed. Nursing Notes, Past Medical Hx, Past Surgical Hx, Social Hx, Allergies, and Family Hx were all reviewed and agreed with or any disagreements were addressed in the HPI.    HPI: (Location, Duration, Timing, Severity, Quality, Assoc Sx, Context, Modifying factors)    Chief Complaint of neck pain and headache    This is a  80 y.o. female who presents with three days of neck pain that has now progressed to a headache. She describes the pain in her neck and shoulders as aching and constant and describes the pain in the top of her head as stabbing and also constant, but has intermittent episodes of shooting pain and the left side of her face and left side of her forehead. . She states she had a brief nosebleed yesterday. However, she does not have any nosebleeding today, no trauma or injury, no fall or injury. She does report that the headache has made her feel nauseous and episode of vomiting 3 days ago, on exam she denies any nausea vomiting abdominal cramping or diarrhea. She denies one-sided weakness or neglect, history of stroke but states she had a \"mini-stroke\" years ago. She denies injury to her head or neck and states she had this pain a few years ago was given some medication and it went away but was not able to elaborate more due to the pain and spasms in her head neck. She denies any nausea vomiting or diarrhea. Abdominal cramping on exam, no neurodeficits.   She is taken some Tylenol with minimal provement, she states the pain is worse with movement of her shoulders and neck when she lifts her arms. She rates the pain a 9/10. She states she takes Eliquis for a previous heart attack with stent placement. Patient's daughter is also at the bedside and states patient has had similar issues in the past with this after doing some moving of furniture, she states that the patient did do a lot of baking for Thanksgiving on Wednesday and thought maybe this is musculoskeletal in nature. Otherwise no additional complaints, no additional aggravating relieving factors.   Patient presents awake, alert and in no acute respiratory distress or toxic appearance    PastMedical/Surgical History:      Diagnosis Date    A-fib ([facility] [place] [address]     Arthritis     CAD (coronary artery disease)     stent placed 2007    CHF (congestive heart failure) ([facility] [place] [address]     Depression 1980    treated with electoshock successfully    Diphtheria     age 15    Gout     Hyperlipemia     Hypertension     STEMI (ST elevation myocardial infarction) ([facility] [place] [address] 2007    Thyroid disease     Wears hearing aid     left ear         Procedure Laterality Date    APPENDECTOMY      BREAST SURGERY      biopsy benign    CARDIAC SURGERY  2007    stent    CATARACT REMOVAL WITH IMPLANT Right 10/11/2017    COLONOSCOPY  4/29/2013    HYSTERECTOMY (CERVIX STATUS UNKNOWN)      JOINT REPLACEMENT Left 8/13/14    LEFT TOTAL KNEE REPLACEMENT WITH FEMORAL NERVE BLOCK FOR PAIN    OTHER SURGICAL HISTORY Right 1/6/14    right total knee replacement    TONSILLECTOMY         Medications:  Previous Medications    ACETAMINOPHEN 325 MG CAPS    Take 650 mg by mouth 3 times daily    ALLOPURINOL (ZYLOPRIM) 100 MG TABLET    Take 100 mg by mouth daily    APIXABAN (ELIQUIS) 2.5 MG TABS TABLET    Take 1 tablet by mouth 2 times daily    ASPIRIN 81 MG CHEWABLE TABLET    Take 1 tablet by mouth daily    ATORVASTATIN (LIPITOR) 20 MG TABLET    Take 20 mg by mouth daily    CHOLECALCIFEROL (VITAMIN D) 2000 UNITS CAPS CAPSULE    Take by mouth Daily    CYANOCOBALAMIN (VITAMIN B 12 PO) Take 1,000 Units by mouth daily     DOCUSATE SODIUM (COLACE) 100 MG CAPSULE    Take 100 mg by mouth 2 times daily    GUAIFENESIN (MUCINEX) 600 MG EXTENDED RELEASE TABLET    Take 1 tablet by mouth 2 times daily    HYDROCORTISONE (ANUSOL-HC) 2.5 % RECTAL CREAM    Place rectally 2 times daily Place rectally 2 times daily. LEVOTHYROXINE (SYNTHROID) 100 MCG TABLET    Take 100 mcg by mouth daily. LIDOCAINE (LIDODERM) 5 %    Place 2 patches onto the skin daily 12 hours on, 12 hours off. MECLIZINE (ANTIVERT) 25 MG CHEW    Take 25 mg by mouth 3 times daily as needed (not used recently). METHENAMINE (MANDELAMINE) 1 G TABLET    Take 1 g by mouth 2 times daily    METOPROLOL SUCCINATE (TOPROL XL) 50 MG EXTENDED RELEASE TABLET    Take 1 tablet by mouth 2 times daily    NITROGLYCERIN (NITROSTAT) 0.4 MG SL TABLET    Place 0.4 mg under the tongue every 5 minutes as needed (never  used). NONFORMULARY        PSYLLIUM (KONSYL) 28.3 % PACK    Take 1 packet by mouth daily    SPIRONOLACTONE (ALDACTONE) 25 MG TABLET    Take 1 tablet by mouth daily    TOLTERODINE (DETROL LA) 2 MG EXTENDED RELEASE CAPSULE    Take 2 mg by mouth Daily with supper    VALSARTAN (DIOVAN) 40 MG TABLET    Take 1 tablet by mouth daily         Review of Systems:  (2-9 systems needed)  Review of Systems   Constitutional:  Negative for chills and fever. HENT:  Positive for nosebleeds. Negative for congestion, ear pain, rhinorrhea, sinus pain and sore throat. Pt states she had a brief nosebleed yesterday. She was unable to quantify how long it lasted or severity stated \"it was only a little while\" and she \"let it dry up. \"   Eyes:  Negative for photophobia, discharge, redness and visual disturbance. She denies any blurred or loss of vision, no floaters or photophobia   Respiratory:  Negative for cough, shortness of breath and wheezing. Cardiovascular:  Negative for chest pain.    Gastrointestinal:  Negative for abdominal pain, diarrhea, nausea and vomiting. She does report the pain made her nauseous a couple days ago however denies any abdominal pain, no vomiting or diarrhea. Genitourinary:  Negative for dysuria, frequency, hematuria and urgency. Musculoskeletal:  Positive for neck pain. Negative for back pain. She reports neck pain and states it hurts her shoulders and neck when she lifts her arms or turns her head. Skin:  Negative for color change. Neurological:  Positive for dizziness and headaches. Negative for facial asymmetry, speech difficulty, weakness and numbness. She does having a left-sided frontal headache that extends from her neck into the left side of her face and forehead. She does report dizziness but states she always has dizziness and takes meclizine for it, but that has been somewhat worse for the past three days. She states that she has no new lightheadedness or dizziness, blurred or loss of vision. Psychiatric/Behavioral:  Negative for confusion. \"Positives and Pertinent negatives as per HPI\"    Physical Exam:  Physical Exam  Vitals and nursing note reviewed. Constitutional:       Appearance: Normal appearance. She is well-developed. She is not toxic-appearing or diaphoretic. HENT:      Head: Normocephalic and atraumatic. Right Ear: External ear normal.      Left Ear: External ear normal.      Mouth/Throat:      Mouth: Mucous membranes are moist.      Pharynx: Oropharynx is clear. Eyes:      General:         Right eye: No discharge. Left eye: No discharge. Extraocular Movements: Extraocular movements intact. Pupils: Pupils are equal, round, and reactive to light. Comments: Pupils are 2 mm round and reactive, normal extraocular movement, no visible nystagmus   Neck:      Vascular: No carotid bruit. Cardiovascular:      Rate and Rhythm: Regular rhythm. Tachycardia present. Pulses: Normal pulses. Heart sounds: Normal heart sounds. Comments: Normal S1 and S2, no peripheral edema observed. Mild tachycardic rate of 105 and regular rhythm. Pulmonary:      Effort: Pulmonary effort is normal. No respiratory distress. Comments: Airway is patent with symmetric rise and fall of chest, lungs are clear anteriorly and posteriorly, she not tachypneic or dyspneic, saturations are 93% on room air. Abdominal:      General: Abdomen is flat. There is no distension. Palpations: Abdomen is soft. Tenderness: There is no abdominal tenderness. Comments: Abdomen soft and nondistended. Bowel sounds are positive, no abdominal tenderness, guarding or rebound tenderness. Musculoskeletal:         General: Tenderness present. No swelling or signs of injury. Normal range of motion. Cervical back: Normal range of motion and neck supple. Comments: Pt reports tenderness with palpation of cervical neck and bilateral shoulders. Lymphadenopathy:      Cervical: No cervical adenopathy. Skin:     General: Skin is warm. Capillary Refill: Capillary refill takes less than 2 seconds. Coloration: Skin is not pale. Neurological:      General: No focal deficit present. Mental Status: She is alert and oriented to person, place, and time. GCS: GCS eye subscore is 4. GCS verbal subscore is 5. GCS motor subscore is 6. Sensory: No sensory deficit. Motor: No weakness. Coordination: Coordination normal.      Comments: Patient is awake, alert, following commands correctly, neurologic intact no focal deficits. No numbness tingling or paresthesias. No facial asymmetry. Tongue is midline. No one-sided weakness or neglect, NIH is 0. Psychiatric:         Behavior: Behavior normal.         Thought Content:  Thought content normal.         Judgment: Judgment normal.       MEDICAL DECISION MAKING    Vitals:    Vitals:    11/27/22 2137 11/27/22 2258 11/28/22 0057 11/28/22 0201   BP:    (!) 121/59   Pulse:  93 93 97   Resp: 24 18 17   Temp:       TempSrc:       SpO2:  96%  97%   Weight: 168 lb (76.2 kg)      Height: 5' 1\" (1.549 m)          LABS:  Labs Reviewed   COMPREHENSIVE METABOLIC PANEL W/ REFLEX TO MG FOR LOW K - Abnormal; Notable for the following components:       Result Value    Sodium 130 (*)     Chloride 98 (*)     Glucose 117 (*)     BUN 41 (*)     Creatinine 1.4 (*)     Est, Glom Filt Rate 35 (*)     Total Protein 8.7 (*)     Albumin/Globulin Ratio 0.9 (*)     All other components within normal limits   URINALYSIS WITH REFLEX TO CULTURE - Abnormal; Notable for the following components:    Leukocyte Esterase, Urine TRACE (*)     All other components within normal limits   CBC WITH AUTO DIFFERENTIAL   TROPONIN   MICROSCOPIC URINALYSIS        Remainder of labs reviewed and were negative at this time or not returned at the time of this note. RADIOLOGY:   Non-plain film images such as CT, Ultrasound and MRI are read by the radiologist. Zari ALLAN, ROBERT - CNP have directly visualized the radiologic plain film image(s) with the below findings:      Interpretation per the Radiologist below, if available at the time of this note:     [facility]   Final Result   1. No acute intracranial process identified. 2. Mild diffuse cerebral volume loss and mild chronic small vessel ischemic   changes. 3. Atherosclerosis without significant arterial stenosis identified within   the neck. 4. No large vessel occlusion, significant stenosis or cerebral aneurysm   identified within the brain. CT HEAD WO CONTRAST   Final Result   1. No acute intracranial process identified. 2. Mild diffuse cerebral volume loss and mild chronic small vessel ischemic   changes. 3. Atherosclerosis without significant arterial stenosis identified within   the neck. 4. No large vessel occlusion, significant stenosis or cerebral aneurysm   identified within the brain.          XR CHEST PORTABLE   Final Result   Mild cardiomegaly with bibasilar atelectasis. MEDICAL DECISION MAKING / ED COURSE:    Because of high probability of sudden clinical deterioration of the patient's condition and risk of further deterioration, critical care time required my full attention to the patient's condition; which included chart data review, documentation, medication ordering, reviewing the patient's old records, reevaluation patient's cardiac, pulmonary and neurological status. Reevaluation of vital signs. Consultations with ED attending and admitting physician. Ordering, interpreting reviewing diagnostic testing. Therefore, I personally saw the patient and independently provided 42 minutes of non-concurrent critical care out of the total shared critical care time provided, direct attention to the patient's condition did not include time spent on procedures. PROCEDURES:   Procedures    None    Patient was given:  Medications   acetaminophen (TYLENOL) tablet 650 mg (0 mg Oral Held 11/27/22 1951)   orphenadrine (NORFLEX) injection 60 mg (has no administration in time range)   iopamidol (ISOVUE-370) 76 % injection 75 mL (75 mLs IntraVENous Given 11/27/22 1918)   diazePAM (VALIUM) injection 2.5 mg (2.5 mg IntraVENous Given 11/27/22 2047)   lidocaine (cardiac) (XYLOCAINE) 76.2 mg in sodium chloride 0.9 % 100 mL IVPB (0 mg IntraVENous Stopped 11/27/22 2222)   Patient complains of  three days of neck pain that has now progressed to a headache. She describes the pain in her neck and shoulders as aching and constant and describes the pain in the top of her head as stabbing and also constant, but has intermittent episodes of shooting pain and the left side of her face and left side of her forehead. . She states she had a brief nosebleed yesterday. Does have reproducible tenderness with slight tapping on the left side of her face and temporal region.   I do have concerns this could be more related to a type of trigeminal neuralgia, little suspicion for giant cell arteritis  After evaluation and examination patient IV access, blood work, urinalysis, CT scan of the head and neck, EKG and chest x-ray were ordered. Urine shows no infection. 80 y.o. female who presents with three days of neck pain that has now progressed to a headache. She describes the pain in her neck and shoulders as aching and constant and describes the pain in the top of her head as stabbing and also constant. Pt states she had a brief nosebleed yesterday. She was unable to quantify how long it lasted or severity stated \"it was only a little while\" and she \"let it dry up. \"  She reports neck pain and states it hurts her shoulders and neck when she lifts her arms or turns her head. Pt reports tenderness with palpation of cervical neck and bilateral shoulders. She does report dizziness but states she always has dizziness and takes meclizine for it, but that has been somewhat worse for the past three days. She denies injury to her head or neck and states she had this pain a few years ago was given some medication and it went away. She rates the pain a 9/10. She states she takes Elaquis for a previous heart attack with stent placement. Patient is awake, alert, following commands correctly, neurologic intact no focal deficits. No numbness tingling or paresthesias. No facial asymmetry. Tongue is midline. Mild tachycardic rate of 105 and regular rhythm, however pt does appear anxious upon exam.   After evaluation and examination patient IV access, blood work, CT scan of the head and CTA of the head and neck were ordered due to her age and risk of compromise at ordered Tylenol and Norflex at first, she is still comfortable I ordered Valium.   Patient was ordered IV lidocaine and upon reexamination she does report some improvement of the pain however her EKG shows an A. fib RVR, she does have a history of A. fib and she is on Eliquis, today's EKG shows A. fib rate of 108 bpm, no acute ST elevation, please see attending physician documentation for EKG interpretation note. In the extremely cautious because her rate is fluctuating around 98-1 1 3 on reexamination, I do not want to give her any medication that lowers her blood pressure due to perfusion. However, I do believe a lot of the symptoms are related to some underlying anxiety or stress and are essentially normal.  However, due to the intractable pain I spoke with the daughter at length and she was worried patient had COVID his last time this happened she did however her COVID and flu swabs are negative. We agreed that the patient would be admitted inpatient to the hospitalist on-call via [facility] for admission. Therefore, patient will be admitted to hospital for further evaluation management care. The patient tolerated their visit well. I evaluated the patient. The physician was available for consultation as needed. The patient and / or the family were informed of the results of any tests, a time was given to answer questions, a plan was proposed and they agreed with plan. I am the Primary Clinician of Record. CLINICAL IMPRESSION:  1. Short lasting unilateral neuralgiform headache with conjunctival injection and tearing (SUNCT), intractable    2. New onset a-fib [facility])        DISPOSITION Admitted 11/28/2022 01:21:13 AM      PATIENT REFERRED TO:  No follow-up provider specified.     DISCHARGE MEDICATIONS:  New Prescriptions    No medications on file       DISCONTINUED MEDICATIONS:  Discontinued Medications    No medications on file              (Please note the MDM and HPI sections of this note were completed with a voice recognition program.  Efforts were made to edit the dictations but occasionally words are mis-transcribed.)    Electronically signed, Willim Fothergill, APRN - CNP,           Willim Fothergill, APRN - CNP  11/28/22 [id number]

## 2022-11-28 ENCOUNTER — APPOINTMENT (OUTPATIENT)
Dept: MRI IMAGING | Age: 87
End: 2022-11-28
Payer: MEDICARE

## 2022-11-28 PROBLEM — R51.9 INTRACTABLE HEADACHE, UNSPECIFIED CHRONICITY PATTERN, UNSPECIFIED HEADACHE TYPE: Status: ACTIVE | Noted: 2022-11-28

## 2022-11-28 PROBLEM — M54.81 OCCIPITAL NEURALGIA OF LEFT SIDE: Status: ACTIVE | Noted: 2022-11-28

## 2022-11-28 PROBLEM — I48.20 ATRIAL FIBRILLATION, CHRONIC (HCC): Status: ACTIVE | Noted: 2022-11-28

## 2022-11-28 LAB
ANION GAP SERPL CALCULATED.3IONS-SCNC: 14 MMOL/L (ref 3–16)
BUN BLDV-MCNC: 37 MG/DL (ref 7–20)
C-REACTIVE PROTEIN: 158.6 MG/L (ref 0–5.1)
CALCIUM SERPL-MCNC: 9.8 MG/DL (ref 8.3–10.6)
CHLORIDE BLD-SCNC: 102 MMOL/L (ref 99–110)
CO2: 18 MMOL/L (ref 21–32)
CREAT SERPL-MCNC: 1.2 MG/DL (ref 0.6–1.2)
EKG ATRIAL RATE: 62 BPM
EKG DIAGNOSIS: NORMAL
EKG Q-T INTERVAL: 324 MS
EKG QRS DURATION: 78 MS
EKG QTC CALCULATION (BAZETT): 434 MS
EKG R AXIS: -28 DEGREES
EKG T AXIS: 60 DEGREES
EKG VENTRICULAR RATE: 108 BPM
GFR SERPL CREATININE-BSD FRML MDRD: 42 ML/MIN/{1.73_M2}
GLUCOSE BLD-MCNC: 103 MG/DL (ref 70–99)
MAGNESIUM: 2.1 MG/DL (ref 1.8–2.4)
POTASSIUM SERPL-SCNC: 4.4 MMOL/L (ref 3.5–5.1)
SEDIMENTATION RATE, ERYTHROCYTE: 65 MM/HR (ref 0–30)
SODIUM BLD-SCNC: 134 MMOL/L (ref 136–145)

## 2022-11-28 PROCEDURE — 85652 RBC SED RATE AUTOMATED: CPT

## 2022-11-28 PROCEDURE — 6360000002 HC RX W HCPCS: Performed by: NURSE PRACTITIONER

## 2022-11-28 PROCEDURE — 6370000000 HC RX 637 (ALT 250 FOR IP): Performed by: NURSE PRACTITIONER

## 2022-11-28 PROCEDURE — 70551 MRI BRAIN STEM W/O DYE: CPT

## 2022-11-28 PROCEDURE — 86140 C-REACTIVE PROTEIN: CPT

## 2022-11-28 PROCEDURE — 83735 ASSAY OF MAGNESIUM: CPT

## 2022-11-28 PROCEDURE — 93010 ELECTROCARDIOGRAM REPORT: CPT | Performed by: INTERNAL MEDICINE

## 2022-11-28 PROCEDURE — 36415 COLL VENOUS BLD VENIPUNCTURE: CPT

## 2022-11-28 PROCEDURE — G0378 HOSPITAL OBSERVATION PER HR: HCPCS

## 2022-11-28 PROCEDURE — 96375 TX/PRO/DX INJ NEW DRUG ADDON: CPT

## 2022-11-28 PROCEDURE — 72141 MRI NECK SPINE W/O DYE: CPT

## 2022-11-28 PROCEDURE — 99205 OFFICE O/P NEW HI 60 MIN: CPT | Performed by: PSYCHIATRY & NEUROLOGY

## 2022-11-28 PROCEDURE — 96372 THER/PROPH/DIAG INJ SC/IM: CPT

## 2022-11-28 PROCEDURE — 2580000003 HC RX 258: Performed by: NURSE PRACTITIONER

## 2022-11-28 PROCEDURE — 80048 BASIC METABOLIC PNL TOTAL CA: CPT

## 2022-11-28 RX ORDER — SODIUM CHLORIDE 0.9 % (FLUSH) 0.9 %
5-40 SYRINGE (ML) INJECTION EVERY 12 HOURS SCHEDULED
Status: DISCONTINUED | OUTPATIENT
Start: 2022-11-28 | End: 2022-11-29 | Stop reason: HOSPADM

## 2022-11-28 RX ORDER — ONDANSETRON 4 MG/1
4 TABLET, ORALLY DISINTEGRATING ORAL EVERY 8 HOURS PRN
Status: DISCONTINUED | OUTPATIENT
Start: 2022-11-28 | End: 2022-11-29 | Stop reason: HOSPADM

## 2022-11-28 RX ORDER — LEVOTHYROXINE SODIUM 0.1 MG/1
100 TABLET ORAL DAILY
Status: DISCONTINUED | OUTPATIENT
Start: 2022-11-28 | End: 2022-11-29 | Stop reason: HOSPADM

## 2022-11-28 RX ORDER — ALLOPURINOL 100 MG/1
100 TABLET ORAL DAILY
Status: DISCONTINUED | OUTPATIENT
Start: 2022-11-28 | End: 2022-11-29 | Stop reason: HOSPADM

## 2022-11-28 RX ORDER — ACETAMINOPHEN 325 MG/1
650 TABLET ORAL EVERY 6 HOURS PRN
Status: DISCONTINUED | OUTPATIENT
Start: 2022-11-28 | End: 2022-11-29 | Stop reason: HOSPADM

## 2022-11-28 RX ORDER — PREDNISONE 20 MG/1
60 TABLET ORAL DAILY
Status: DISCONTINUED | OUTPATIENT
Start: 2022-11-28 | End: 2022-11-28

## 2022-11-28 RX ORDER — FUROSEMIDE 20 MG/1
20 TABLET ORAL 2 TIMES DAILY
Status: ON HOLD | COMMUNITY
End: 2022-11-29 | Stop reason: HOSPADM

## 2022-11-28 RX ORDER — TRAMADOL HYDROCHLORIDE 50 MG/1
50 TABLET ORAL EVERY 6 HOURS PRN
Status: DISCONTINUED | OUTPATIENT
Start: 2022-11-28 | End: 2022-11-29 | Stop reason: HOSPADM

## 2022-11-28 RX ORDER — ACETAMINOPHEN 650 MG/1
650 SUPPOSITORY RECTAL EVERY 6 HOURS PRN
Status: DISCONTINUED | OUTPATIENT
Start: 2022-11-28 | End: 2022-11-29 | Stop reason: HOSPADM

## 2022-11-28 RX ORDER — VALSARTAN 80 MG/1
40 TABLET ORAL DAILY
Status: DISCONTINUED | OUTPATIENT
Start: 2022-11-28 | End: 2022-11-29 | Stop reason: HOSPADM

## 2022-11-28 RX ORDER — SODIUM CHLORIDE 9 MG/ML
INJECTION, SOLUTION INTRAVENOUS PRN
Status: DISCONTINUED | OUTPATIENT
Start: 2022-11-28 | End: 2022-11-29 | Stop reason: HOSPADM

## 2022-11-28 RX ORDER — SPIRONOLACTONE 25 MG/1
25 TABLET ORAL DAILY
Status: DISCONTINUED | OUTPATIENT
Start: 2022-11-28 | End: 2022-11-29 | Stop reason: HOSPADM

## 2022-11-28 RX ORDER — METHENAMINE MANDELATE 500 MG/1
1 TABLET ORAL 2 TIMES DAILY
Status: DISCONTINUED | OUTPATIENT
Start: 2022-11-28 | End: 2022-11-29 | Stop reason: HOSPADM

## 2022-11-28 RX ORDER — SODIUM CHLORIDE 0.9 % (FLUSH) 0.9 %
5-40 SYRINGE (ML) INJECTION PRN
Status: DISCONTINUED | OUTPATIENT
Start: 2022-11-28 | End: 2022-11-29 | Stop reason: HOSPADM

## 2022-11-28 RX ORDER — POLYETHYLENE GLYCOL 3350 17 G/17G
17 POWDER, FOR SOLUTION ORAL DAILY PRN
Status: DISCONTINUED | OUTPATIENT
Start: 2022-11-28 | End: 2022-11-29 | Stop reason: HOSPADM

## 2022-11-28 RX ORDER — DOCUSATE SODIUM 100 MG/1
100 CAPSULE, LIQUID FILLED ORAL 2 TIMES DAILY
Status: DISCONTINUED | OUTPATIENT
Start: 2022-11-28 | End: 2022-11-29 | Stop reason: HOSPADM

## 2022-11-28 RX ORDER — METOPROLOL SUCCINATE 50 MG/1
50 TABLET, EXTENDED RELEASE ORAL 2 TIMES DAILY
Status: DISCONTINUED | OUTPATIENT
Start: 2022-11-28 | End: 2022-11-29 | Stop reason: HOSPADM

## 2022-11-28 RX ORDER — ONDANSETRON 2 MG/ML
4 INJECTION INTRAMUSCULAR; INTRAVENOUS EVERY 6 HOURS PRN
Status: DISCONTINUED | OUTPATIENT
Start: 2022-11-28 | End: 2022-11-29 | Stop reason: HOSPADM

## 2022-11-28 RX ORDER — ORPHENADRINE CITRATE 30 MG/ML
60 INJECTION INTRAMUSCULAR; INTRAVENOUS EVERY 12 HOURS
Status: DISCONTINUED | OUTPATIENT
Start: 2022-11-28 | End: 2022-11-29 | Stop reason: HOSPADM

## 2022-11-28 RX ORDER — ASPIRIN 81 MG/1
81 TABLET, CHEWABLE ORAL DAILY
Status: DISCONTINUED | OUTPATIENT
Start: 2022-11-28 | End: 2022-11-29 | Stop reason: HOSPADM

## 2022-11-28 RX ORDER — METHYLPREDNISOLONE SODIUM SUCCINATE 40 MG/ML
20 INJECTION, POWDER, LYOPHILIZED, FOR SOLUTION INTRAMUSCULAR; INTRAVENOUS DAILY
Status: DISCONTINUED | OUTPATIENT
Start: 2022-11-28 | End: 2022-11-29 | Stop reason: HOSPADM

## 2022-11-28 RX ORDER — ATORVASTATIN CALCIUM 10 MG/1
20 TABLET, FILM COATED ORAL DAILY
Status: DISCONTINUED | OUTPATIENT
Start: 2022-11-28 | End: 2022-11-29 | Stop reason: HOSPADM

## 2022-11-28 RX ADMIN — APIXABAN 2.5 MG: 2.5 TABLET, FILM COATED ORAL at 08:54

## 2022-11-28 RX ADMIN — ATORVASTATIN CALCIUM 20 MG: 10 TABLET, FILM COATED ORAL at 08:54

## 2022-11-28 RX ADMIN — APIXABAN 2.5 MG: 2.5 TABLET, FILM COATED ORAL at 21:25

## 2022-11-28 RX ADMIN — METHYLPREDNISOLONE SODIUM SUCCINATE 20 MG: 40 INJECTION, POWDER, FOR SOLUTION INTRAMUSCULAR; INTRAVENOUS at 17:26

## 2022-11-28 RX ADMIN — ORPHENADRINE CITRATE 60 MG: 30 INJECTION INTRAMUSCULAR; INTRAVENOUS at 03:11

## 2022-11-28 RX ADMIN — METOPROLOL SUCCINATE 50 MG: 50 TABLET, EXTENDED RELEASE ORAL at 17:26

## 2022-11-28 RX ADMIN — METOPROLOL SUCCINATE 50 MG: 50 TABLET, EXTENDED RELEASE ORAL at 08:55

## 2022-11-28 RX ADMIN — DOCUSATE SODIUM 100 MG: 100 CAPSULE, LIQUID FILLED ORAL at 21:25

## 2022-11-28 RX ADMIN — Medication 10 ML: at 08:57

## 2022-11-28 RX ADMIN — ALLOPURINOL 100 MG: 100 TABLET ORAL at 08:56

## 2022-11-28 RX ADMIN — VALSARTAN 40 MG: 80 TABLET ORAL at 08:54

## 2022-11-28 RX ADMIN — SPIRONOLACTONE 25 MG: 25 TABLET ORAL at 08:55

## 2022-11-28 RX ADMIN — LEVOTHYROXINE SODIUM 100 MCG: 0.1 TABLET ORAL at 06:52

## 2022-11-28 ASSESSMENT — PAIN DESCRIPTION - ONSET: ONSET: ON-GOING

## 2022-11-28 ASSESSMENT — PAIN DESCRIPTION - LOCATION: LOCATION: FACE

## 2022-11-28 ASSESSMENT — PAIN DESCRIPTION - FREQUENCY: FREQUENCY: CONTINUOUS

## 2022-11-28 ASSESSMENT — PAIN SCALES - GENERAL
PAINLEVEL_OUTOF10: 8
PAINLEVEL_OUTOF10: 10
PAINLEVEL_OUTOF10: 8
PAINLEVEL_OUTOF10: 7

## 2022-11-28 ASSESSMENT — PAIN DESCRIPTION - PAIN TYPE: TYPE: ACUTE PAIN

## 2022-11-28 ASSESSMENT — PAIN DESCRIPTION - ORIENTATION: ORIENTATION: LEFT

## 2022-11-28 ASSESSMENT — PAIN DESCRIPTION - DESCRIPTORS: DESCRIPTORS: SPASM;SHOOTING

## 2022-11-28 NOTE — ED NOTES
Pt refusing orthos until pain meds. This RN told her we needed to wait until CT scan resulted before medication would be prescribed. Pt verbalized understanding.       Mauricio Ladd RN  11/27/22 4065

## 2022-11-28 NOTE — CONSULTS
In patient Neurology consult        [facility]      Prakash Matias MD      [address]  1930    Date of Service: 2022    Referring Physician: Esthela Richardson MD      Reason for the consult and CC: Acute intractable headache    HPI:   The patient is a 80 y.o.   female, with a PMH of A. Fib on Eliquis, HTN, and HLD, who presented to [facility] with an acute intractable headache. The patient describes shooting pain in her left temple for the past 3 days. She notes the shooting pains happen every 30 seconds or so. She states it is so severe, she is unable to get any sleep or rest.  She has never had anything like this before. She denies fever, chills, dizziness, vision changes, dysarthria, dysphagia, tinnitus, focal weakness, and paraesthesias.           Family History   Problem Relation Age of Onset    Cancer Brother         colon     Past Surgical History:   Procedure Laterality Date    APPENDECTOMY      BREAST SURGERY      biopsy benign    CARDIAC SURGERY      stent    CATARACT REMOVAL WITH IMPLANT Right 10/11/2017    COLONOSCOPY  2013    HYSTERECTOMY (CERVIX STATUS UNKNOWN)      JOINT REPLACEMENT Left 14    LEFT TOTAL KNEE REPLACEMENT WITH FEMORAL NERVE BLOCK FOR PAIN    OTHER SURGICAL HISTORY Right 14    right total knee replacement    TONSILLECTOMY          Past Medical History:   Diagnosis Date    A-fib [facility])     Arthritis     CAD (coronary artery disease)     stent placed     CHF (congestive heart failure) ([facility] [address]     Depression     treated with electoshock successfully    Diphtheria     age 15    Gout     Hyperlipemia     Hypertension     STEMI (ST elevation myocardial infarction) ([facility] [address] 2007    Thyroid disease     Wears hearing aid     left ear     Social History     Tobacco Use    Smoking status: Former     Packs/day: 0.50     Years: 40.00     Pack years: 20.00     Types: Cigarettes     Quit date: 2000     Years since quittin.3    Smokeless tobacco: Never    Tobacco comments:     less than pack day   Substance Use Topics    Alcohol use: No    Drug use: No     Allergies   Allergen Reactions    Ace Inhibitors Other (See Comments)     cough    Amoxicillin Hives     Hives    Bactrim [Sulfamethoxazole-Trimethoprim]     Ciprofloxacin     Clindamycin/Lincomycin     Naproxen Nausea Only     Nausea and stomach pain    Other Hallucinations     opiods    Statins Other (See Comments)     myalgia  unknown    Hydrochlorothiazide Rash    Sulfa Antibiotics Rash     rash  Blistering rash     Current Facility-Administered Medications   Medication Dose Route Frequency Provider Last Rate Last Admin    allopurinol (ZYLOPRIM) tablet 100 mg  100 mg Oral Daily Larwance Coffin, APRN - CNP   100 mg at 11/28/22 0856    apixaban (ELIQUIS) tablet 2.5 mg  2.5 mg Oral BID Larwance Coffin, APRN - CNP   2.5 mg at 11/28/22 6786    aspirin chewable tablet 81 mg  81 mg Oral Daily Larwance Coffin, APRN - CNP        atorvastatin (LIPITOR) tablet 20 mg  20 mg Oral Daily Larwance Coffin, APRN - CNP   20 mg at 11/28/22 0854    docusate sodium (COLACE) capsule 100 mg  100 mg Oral BID Larwance Coffin, APRN - CNP        levothyroxine (SYNTHROID) tablet 100 mcg  100 mcg Oral Daily Larwance Coffin, APRN - CNP   100 mcg at 11/28/22 8297    methenamine (MANDELAMINE) tablet 1 g  1 g Oral BID Larwance Coffin, APRN - CNP        metoprolol succinate (TOPROL XL) extended release tablet 50 mg  50 mg Oral BID Larwance Coffin, APRN - CNP   50 mg at 11/28/22 0855    spironolactone (ALDACTONE) tablet 25 mg  25 mg Oral Daily Larwance Coffin, APRN - CNP   25 mg at 11/28/22 0855    valsartan (DIOVAN) tablet 40 mg  40 mg Oral Daily Larwance Coffin, APRN - CNP   40 mg at 11/28/22 0854    sodium chloride flush 0.9 % injection 5-40 mL  5-40 mL IntraVENous 2 times per day Larwance Coffin, APRN - CNP   10 mL at 11/28/22 0857    sodium chloride flush 0.9 % injection 5-40 mL  5-40 mL IntraVENous PRN Larwance Coffin, APRN - CNP 0.9 % sodium chloride infusion   IntraVENous PRN Melissa Nelson, APRN - CNP        ondansetron (ZOFRAN-ODT) disintegrating tablet 4 mg  4 mg Oral Q8H PRN Melissa Nelson, APRN - CNP        Or    ondansetron (ZOFRAN) injection 4 mg  4 mg IntraVENous Q6H PRN Melissa Nelson, APRN - CNP        polyethylene glycol (GLYCOLAX) packet 17 g  17 g Oral Daily PRN Melissa Nelson, APRN - CNP        acetaminophen (TYLENOL) tablet 650 mg  650 mg Oral Q6H PRN Melissa Nelson, APRN - CNP        Or    acetaminophen (TYLENOL) suppository 650 mg  650 mg Rectal Q6H PRN Melissa Nelson, APRN - CNP        traMADol (ULTRAM) tablet 50 mg  50 mg Oral Q6H PRN Melissa Nelson, APRN - CNP        orphenadrine (NORFLEX) injection 60 mg  60 mg IntraMUSCular Q12H Melissa Nelson, APRN - CNP   60 mg at 11/28/22 0362    predniSONE (DELTASONE) tablet 60 mg  60 mg Oral Daily Camilo Rose, APRN - CNP        acetaminophen (TYLENOL) tablet 650 mg  650 mg Oral Once Zari Park, APRN - CNP           ROS : A 10-14 system review of constitutional, cardiovascular, respiratory, eyes, musculoskeletal, endocrine, GI, ENT, skin, hematological, genitourinary, psychiatric and neurologic systems was obtained and updated today and is unremarkable except as mentioned in my HPI      Exam:     Constitutional:   Vitals:    11/28/22 0252 11/28/22 0845 11/28/22 1212 11/28/22 1245   BP:  (!) 145/78 115/77    Pulse:  (!) 111 98    Resp:  18 20    Temp:  98.8 °F (37.1 °C) 98.5 °F (36.9 °C)    TempSrc:  Oral Oral    SpO2:  96% 97%    Weight: 176 lb 9.4 oz (80.1 kg)      Height: 5' 1\" (1.549 m)   5' 1\" (1.549 m)       General appearance and observation: Normal development and appears to be in pain. Holding the left side of her head with her hands. Eye:  Fundus: No blurring of optic disc. Neck: supple  Cardiovascular: No lower leg edema with good pulsation. Mental Status:   Oriented to person, place, problem, and time. Memory: Good immediate recall. Intact remote memory  Normal attention span and concentration. Language: intact naming, repeating and fluency   Good fund of Knowledge. Aware of current events and vocabulary   Cranial Nerves:   II: Visual fields: Full. Pupils: equal, round, reactive to light  III,IV,VI: Extra Ocular Movements are intact. No nystagmus  V: Facial sensation is intact  VII: Facial strength and movements: intact and symmetric  VIII: Hearing: Intact  IX: Palate elevation is symmetric  XI: Shoulder shrug is intact  XII: Tongue movements are normal  Musculoskeletal: 5/5 in all 4 extremities. Tone: Normal tone. Reflexes: Symmetric 2+ in the arms and 2+ in the legs   Planters: flexor bilaterally. Coordination: no pronator drift, no dysmetria with FNF in upper extremities. Normal REM. Sensation: normal to all modalities in both arms and legs. Gait/Posture: steady gait and normal posturing and station. Data:  LABS:   Lab Results   Component Value Date/Time     11/28/2022 07:09 AM    K 4.4 11/28/2022 07:09 AM    K 4.6 11/27/2022 06:14 PM     11/28/2022 07:09 AM    CO2 18 11/28/2022 07:09 AM    BUN 37 11/28/2022 07:09 AM    CREATININE 1.2 11/28/2022 07:09 AM    GFRAA 56 03/13/2021 08:12 AM    GFRAA >60 09/30/2012 02:33 PM    LABGLOM 42 11/28/2022 07:09 AM    GLUCOSE 103 11/28/2022 07:09 AM    MG 2.10 11/28/2022 07:09 AM    CALCIUM 9.8 11/28/2022 07:09 AM     Lab Results   Component Value Date/Time    WBC 10.5 11/27/2022 06:14 PM    RBC 4.11 11/27/2022 06:14 PM    HGB 13.0 11/27/2022 06:14 PM    HCT 39.3 11/27/2022 06:14 PM    MCV 95.6 11/27/2022 06:14 PM    RDW 13.9 11/27/2022 06:14 PM     11/27/2022 06:14 PM     Lab Results   Component Value Date    INR 1.10 08/05/2018    PROTIME 12.5 08/05/2018       Reviewed notes from different physicians  Reviewed lab and blood testing    Impression:     Acute intractable headache - denies vision loss, but concern for GCA given elevated inflammatory markers.   CT head negative. A. Fib on Eliquis  HTN    Recommendation:     MRI of brain and c-spine ordered per IM. Monitor on tele. Continue Eliquis, statin. Continue home BP meds. F/u A1c, lipid panel. We will start prednisone for possible GCA while we await neuroimaging. Thank you for referring such patient. If you have any questions regarding my consult note, please don't hesitate to call me. Miesha Wooten CNP    Attending Supervising [de-identified] Attestation Statement      The patient was seen 11/28/2022 in conjunction with the nurse practitioner with independent history, evaluation and examination. I agree with the note which has been adjusted to reflect my findings, with the addition of the following: The patient is 80 y.o.  female  who was admitted for new onset intractable headache. Symptoms started 2 to 3 days prior to admission. Description holocranial pain which was severe and persistent but waxing and waning. Location left temporal and frontal region. No visual disturbance or weakness or numbness. No other relieving or aggravating factors or triggers. Patient was admitted for medical management. Initial imaging with CT head showed no acute findings or large vessel occlusion. Today she is about the same. ESR and CRP were moderately elevated. On examination:  No acute distress  Awake and alert x3. Fluent speech. Appears appropriate with intact recent and remote memory. Pupil reactive and symmetric, extraocular motor intact, no ophthalmoplegia, face is symmetric and tongue is midline  No focal weakness with symmetric DTR  Normal tone  No sensory disturbance or abnormal movement    Impression:  New onset intractable headache, so far primary versus secondary. Moderately elevated ESR which could be secondary to pain, however GCA cannot be fully excluded. Will need MRI of the brain for secondary causes. Start prednisone empirically.     A. fib with Eliquis  Hypertension      Recommendation:  MRI brain  Eliquis  Stop prednisone  Pain control  Hydration  Agree with MRI of the C-spine  Follow inflammatory markers  Blood pressure control and monitor  Statin  Discussed with primary team      Electronically signed by Tony Hager MD on 11/28/22 at 3:20 PM EST       This dictation was generated by voice recognition computer software.  Although all attempts are made to edit the dictation for accuracy, there may be errors in the  transcription that are not intended

## 2022-11-28 NOTE — PROGRESS NOTES
Discussed concern for GCA with Dr. Aida Barron. On exam, patient notes she feels relief with heat pack on her neck and massage. Daughter notes the patient does not tolerate steroids well, especially PO. Dr. Aida Barron would like to use low dose Solu-medrol 20 mg IV daily for now - ordered.

## 2022-11-28 NOTE — PROGRESS NOTES
Physical Therapy    Per patient chart pt is pending Neurology consult for sharp shooting pain in L head and neck. Will hold PT eval until consult complete. Will follow up at a later date/time pending Neuro consult results and when pt is appropriate to see. Thank you. Ruby Topete PT, DPT.

## 2022-11-28 NOTE — PLAN OF CARE
Pt scoring pain on 0-10 scale. Pain medications given per MAR. Pt instructed to call out when pain level increasing. Call light within reach. Nurse will continue to reassess and monitor.     Problem: Pain  Goal: Verbalizes/displays adequate comfort level or baseline comfort level  Outcome: Progressing

## 2022-11-28 NOTE — PROGRESS NOTES
Patient admitted to room 544 from ED. Patient oriented to room, call light, bed rails, phone, lights and bathroom. Patient instructed about the schedule of the day including: vital sign frequency, lab draws, possible tests, frequency of MD and staff rounds, daily weights, I &O's and prescribed diet. Telemetry box in place, patient aware of placement and reason. Bed locked, in lowest position, side rails up 2/4, call light within reach.

## 2022-11-28 NOTE — CONSULTS
Comprehensive Nutrition Assessment    Type and Reason for Visit:  Initial, Consult    Nutrition Recommendations/Plan:   Modify diet to regular diet and encourage PO intake   FR per MD  RD to add ensure chocolate BID  Monitor nutrition adequacy, pertinent labs, bowel habits, wt changes, and clinical progress     Malnutrition Assessment:  Malnutrition Status: At risk for malnutrition (Comment) (11/28/22 4242)    Context:  Acute Illness     Findings of the 6 clinical characteristics of malnutrition:  Energy Intake:  Mild decrease in energy intake (Comment)    Nutrition Assessment:    Consult for CHF education: 80-year-old female w/ PMH of hypertension, hyperlipidemia, CAD, and CHF admitted w/ severe left neck pain that radiates into the left side of her head. Neuro consulted. On cardiac diet w/ 1200 ml FR. RD to liberalize diet to regular to promote PO intake. Pt in pain at time of visit. Pt reports poor intake and appetite PTA and since admission. Endorses 19 lb wt loss, unsure of time frame. Willing to trial ONS, RD to add chocolate ensure BID. Pt not appropriate for CHF education at this time d/t poor appetite and pain. Continue to encourage PO intake, will continue to monitor. Nutrition Related Findings:    Na 134. No BM recorded. Wound Type: None       Current Nutrition Intake & Therapies:    Average Meal Intake: Unable to assess  Average Supplements Intake: None Ordered  ADULT DIET; Regular; 1200 ml  ADULT ORAL NUTRITION SUPPLEMENT; Breakfast, Dinner; Standard High Calorie/High Protein Oral Supplement    Anthropometric Measures:  Height: 5' 1\" (154.9 cm)  Ideal Body Weight (IBW): 105 lbs (48 kg)       Current Body Weight: 176 lb (79.8 kg), 167.6 % IBW. Weight Source: Bed Scale  Current BMI (kg/m2): 33.3                       BMI Categories: Obese Class 1 (BMI 30.0-34. 9)    Estimated Daily Nutrient Needs:  Energy Requirements Based On: Kcal/kg (30-35 kcals/kg)  Weight Used for Energy Requirements: Ideal (48 kg)  Energy (kcal/day): 1803-6219  Weight Used for Protein Requirements: Ideal (1-1.2 g/kg)  Protein (g/day): 48-58 g  Method Used for Fluid Requirements: 1 ml/kcal    Nutrition Diagnosis:   Inadequate oral intake related to inadequate protein-energy intake as evidenced by weight loss, poor intake prior to admission    Nutrition Interventions:   Food and/or Nutrient Delivery: Continue Current Diet, Start Oral Nutrition Supplement  Nutrition Education/Counseling: Education not appropriate  Coordination of Nutrition Care: Continue to monitor while inpatient       Goals:     Goals: PO intake 50% or greater, prior to discharge       Nutrition Monitoring and Evaluation:   Behavioral-Environmental Outcomes: None Identified  Food/Nutrient Intake Outcomes: Food and Nutrient Intake, Supplement Intake  Physical Signs/Symptoms Outcomes: Biochemical Data, Weight, Nutrition Focused Physical Findings    Discharge Planning:    Continue current diet, Continue Oral Nutrition Supplement     Joanna Armando MS, [address] [place] [address],   Contact: Office: [phone]; [address]: [phone]

## 2022-11-28 NOTE — H&P
Hospital Medicine History & Physical      PCP: Israel العلي    Date of Admission: 11/28/2022    Time of Service: 0100    Date of Service: Pt seen/examined on 11/28/2022 and placed in observation. Historian: Self, daughter at bedside, ED documentation    Chief Concern:    Chief Complaint   Patient presents with    Headache     For three days yesterday was having a nosebleed. History Of Present Illness:      Jessica Hernández is a 80-year-old female with significant past medical history of hypertension, hyperlipidemia, gouty arthritis, CAD, atrial fibrillation, and congestive heart failure who presents to [facility] emergency department with complaint of severe left neck pain that radiates into the left side of her head. She describes the pain as sharp, stabbing, 10 out of 10, comes and goes, feels like lightning bolts in her scalp. She states that she has a history of neck injury in the 1960s that was not treated so occasionally she would have these type of symptoms but this episode is worse. Her daughter notes that she spent Thanksgiving taking [place] pastries so required a lot of bending, standing, and using arms and shoulders. Her daughter feels that she may have overdone it. This has happened to nearly this degree 1 other episode about a year ago when she was lifting a nightstand table. Patient denies any visual changes, no weakness or numbness especially in the extremities. Symptoms are isolated to back of left neck and left occiput. Her evaluation here included laboratory studies, EKG, chest x-ray, CT of the head, and CTA of the head neck. Both CTs were negative for any acute findings; chronic changes were noted though. Chest x-ray did show mild cardiomegaly with bibasilar atelectasis but otherwise no acute findings. Pertinent lab values tonight show sodium 130, chloride 98, BUN 41, creatinine 1.4, GFR 35, and a blood sugar 117. Troponin was less than 0.01.   Cell count was unremarkable for any acute leukocytosis/leukopenia, acute anemia, or acute platelet abnormalities. Urinalysis showed normal metrics aside from trace leukocyte esterase; microscopy did not reflect any pyuria. Patient was given diazepam and a lidocaine infusion in the emergency department with modest improvement of her pain. Currently still complains of lightening bolt-like sensation shooting from her neck into her scalp, pain currently 7 out of 10. Hospital team was consulted to place this observation for intractable headache/occipital neuralgia. Past Medical History:          Diagnosis Date    A-fib [facility])     Arthritis     CAD (coronary artery disease)     stent placed 2007    CHF (congestive heart failure) ([facility]     Depression 1980    treated with electoshock successfully    Diphtheria     age 15    Gout     Hyperlipemia     Hypertension     STEMI (ST elevation myocardial infarction) ([facility] [address] 2007    Thyroid disease     Wears hearing aid     left ear     Past Surgical History:          Procedure Laterality Date    APPENDECTOMY      BREAST SURGERY      biopsy benign    CARDIAC SURGERY  2007    stent    CATARACT REMOVAL WITH IMPLANT Right 10/11/2017    COLONOSCOPY  4/29/2013    HYSTERECTOMY (CERVIX STATUS UNKNOWN)      JOINT REPLACEMENT Left 8/13/14    LEFT TOTAL KNEE REPLACEMENT WITH FEMORAL NERVE BLOCK FOR PAIN    OTHER SURGICAL HISTORY Right 1/6/14    right total knee replacement    TONSILLECTOMY       Medications Prior to Admission:      Prior to Admission medications    Medication Sig Start Date End Date Taking?  Authorizing Provider   guaiFENesin (MUCINEX) 600 MG extended release tablet Take 1 tablet by mouth 2 times daily 3/14/21   Dileep Escamilla MD   aspirin 81 MG chewable tablet Take 1 tablet by mouth daily  Patient not taking: Reported on 11/28/2022 6/26/19   ROBERT Wheeler - CNP   metoprolol succinate (TOPROL XL) 50 MG extended release tablet Take 1 tablet by mouth 2 times daily  Patient taking differently: Take 50 mg by mouth daily 6/25/19   [facility]   apixaban (ELIQUIS) 2.5 MG TABS tablet Take 1 tablet by mouth 2 times daily 6/25/19   [facility]   valsartan (DIOVAN) 40 MG tablet Take 1 tablet by mouth daily  Patient not taking: Reported on 11/28/2022 8/10/18   Charlotte Diaz MD   lidocaine (LIDODERM) 5 % Place 2 patches onto the skin daily 12 hours on, 12 hours off. 8/9/18   Charlotte Diaz MD   spironolactone (ALDACTONE) 25 MG tablet Take 1 tablet by mouth daily 8/10/18   Charlotte Diaz MD   Acetaminophen 325 MG CAPS Take 650 mg by mouth 3 times daily    Historical Provider, MD   hydrocortisone (ANUSOL-HC) 2.5 % rectal cream Place rectally 2 times daily Place rectally 2 times daily. Historical Provider, MD   psyllium (KONSYL) 28.3 % PACK Take 1 packet by mouth daily    Historical Provider, MD   NONFORMULARY     Historical Provider, MD   atorvastatin (LIPITOR) 20 MG tablet Take 20 mg by mouth daily    Historical Provider, MD   tolterodine (DETROL LA) 2 MG extended release capsule Take 2 mg by mouth Daily with supper    Historical Provider, MD   methenamine (MANDELAMINE) 1 g tablet Take 1 g by mouth 2 times daily    Historical Provider, MD   Cholecalciferol (VITAMIN D) 2000 units CAPS capsule Take by mouth Daily    Historical Provider, MD   Cyanocobalamin (VITAMIN B 12 PO) Take 1,000 Units by mouth daily     Historical Provider, MD   allopurinol (ZYLOPRIM) 100 MG tablet Take 100 mg by mouth daily    Historical Provider, MD   docusate sodium (COLACE) 100 MG capsule Take 100 mg by mouth 2 times daily    Historical Provider, MD   nitroGLYCERIN (NITROSTAT) 0.4 MG SL tablet Place 0.4 mg under the tongue every 5 minutes as needed (never  used). Historical Provider, MD   meclizine (ANTIVERT) 25 MG CHEW Take 25 mg by mouth 3 times daily as needed (not used recently).     Historical Provider, MD   levothyroxine (SYNTHROID) 100 MCG tablet Take 100 mcg by mouth daily. Historical Provider, MD     Allergies:  Ace inhibitors, Amoxicillin, Bactrim [sulfamethoxazole-trimethoprim], Ciprofloxacin, Clindamycin/lincomycin, Naproxen, Other, Statins, Hydrochlorothiazide, and Sulfa antibiotics    Social History:      The patient currently lives at home with family. TOBACCO:   reports that she quit smoking about 22 years ago. Her smoking use included cigarettes. She has a 20.00 pack-year smoking history. She has never used smokeless tobacco.  ETOH:   reports no history of alcohol use. E-cigarette/Vaping       Questions Responses    E-cigarette/Vaping Use     Start Date     Passive Exposure     Quit Date     Counseling Given     Comments           Family History:      Reviewed in detail at bedside with patient; positive as follows:        Problem Relation Age of Onset    Cancer Brother         colon       REVIEW OF SYSTEMS COMPLETED:   Pertinent positives as noted in the HPI. All other systems reviewed and negative. PHYSICAL EXAM PERFORMED:    /89   Pulse 92   Temp 98.2 °F (36.8 °C) (Oral)   Resp 20   Ht 5' 1\" (1.549 m)   Wt 176 lb 9.4 oz (80.1 kg)   SpO2 92%   BMI 33.37 kg/m²     General appearance:  Slight grimacing, holds scalp when pain hits, appears stated age and cooperative. HEENT:  Normal cephalic, atraumatic without obvious deformity. Pupils equal, round, and reactive to light. Extra ocular muscles intact. Conjunctivae/corneas clear. Neck: Supple, with full range of motion. No jugular venous distention. Trachea midline. Respiratory:  Normal respiratory effort. Clear to auscultation, bilaterally without Rales/Wheezes/Rhonchi. Cardiovascular:  Regular rate and rhythm with normal S1/S2 without murmurs, rubs or gallops. Abdomen: Soft, non-tender, non-distended with normal bowel sounds. Musculoskeletal:  No clubbing, cyanosis or edema bilaterally. Full range of motion without deformity.  Examination of head and neck reveals unremarkable external findings, palpation along c-spine bilaterally elicits pain, pain noted in left trapezius as well, patient is very guarded so pinpoint regions difficult to discern, not tolerating any ROM of neck. Skin: Skin color, texture, turgor normal.  No rashes or lesions. Neurologic:  Neurovascularly intact without any focal sensory/motor deficits. Cranial nerves: II-XII intact, grossly non-focal.  Psychiatric:  Alert and oriented, thought content appropriate, normal insight  Capillary Refill: Brisk,3 seconds, normal  Peripheral Pulses: +2 palpable, equal bilaterally     Labs:     Recent Labs     11/27/22 1814   WBC 10.5   HGB 13.0   HCT 39.3        Recent Labs     11/27/22 1814   *   K 4.6   CL 98*   CO2 21   BUN 41*   CREATININE 1.4*   CALCIUM 9.9     Recent Labs     11/27/22 1814   AST 19   ALT 14   BILITOT 0.6   ALKPHOS 77     Recent Labs     11/27/22 1814   TROPONINI <0.01       Urinalysis:      Lab Results   Component Value Date/Time    NITRU Negative 11/27/2022 11:10 PM    WBCUA 0-2 11/27/2022 11:10 PM    BACTERIA Rare 03/06/2019 04:57 PM    RBCUA 0-2 11/27/2022 11:10 PM    BLOODU Negative 11/27/2022 11:10 PM    SPECGRAV 1.010 11/27/2022 11:10 PM    GLUCOSEU Negative 11/27/2022 11:10 PM     Radiology:     I have reviewed the radiographic results for this encounter with the following interpretation(s); see report(s) below:    CTA HEAD NECK W CONTRAST   Final Result   1. No acute intracranial process identified. 2. Mild diffuse cerebral volume loss and mild chronic small vessel ischemic   changes. 3. Atherosclerosis without significant arterial stenosis identified within   the neck. 4. No large vessel occlusion, significant stenosis or cerebral aneurysm   identified within the brain. CT HEAD WO CONTRAST   Final Result   1. No acute intracranial process identified. 2. Mild diffuse cerebral volume loss and mild chronic small vessel ischemic   changes.    3. Atherosclerosis without significant arterial stenosis identified within   the neck. 4. No large vessel occlusion, significant stenosis or cerebral aneurysm   identified within the brain. XR CHEST PORTABLE   Final Result   Mild cardiomegaly with bibasilar atelectasis. MRI BRAIN WO CONTRAST    (Results Pending)   MRI CERVICAL SPINE WO CONTRAST    (Results Pending)       EKG:  I have reviewed the EKG with the following interpretation in the absence of a cardiologist: AF, non-acute    Consults:    IP CONSULT TO HEART FAILURE NURSE/COORDINATOR  IP CONSULT TO DIETITIAN  IP CONSULT TO NEUROLOGY    ASSESSMENT:    Active Hospital Problems    Diagnosis Date Noted    Intractable headache, unspecified chronicity pattern, unspecified headache type [R51.9] 11/28/2022     Priority: High    Occipital neuralgia of left side [M54.81] 11/28/2022     Priority: High    Atrial fibrillation, chronic ([facility] [address] [I48.20] 11/28/2022     Priority: Medium     PLAN:    Intractable Head Pain/Occipital Neuralgia  - Pain distribution within the occiput, origination of pain described to come from left side of neck/trapezius, difficult to isolate any trigger points, painful everywhere in neck, even right side  - Suspect overuse/strain  - CTA H/N reassuring; negative  - No pain in face CN V seemingly intact, no temporal pain c/w temporal arteritis  - Place in observation  - Consulted Neurology for eval in AM  - MRI brain and c-spine in AM  - Continue pain control, daughter requesting non-opioid options due to adverse reaction to opioids I.e. hallucinations  - Add Norflex IM PRN  - PT consulted     Atrial Fibrillation  - Rate controlled  - Continue Eliquis and metoprolol    History of CHF  - CHF order sets in place  - Continue GDMT with BB, ARB, and MRA    CKD, Stage 3  - Cr 1.4, baseline 1.1, mostly at baseline    Gout  - Continue allopurinol    Hypothyroidism  - Continue Synthroid    DVT Prophylaxis: Eliquis  SRMB Prophylaxis: N/A  Diet: ADULT DIET;  Regular; Low Fat/Low Chol/High Fiber/2 gm Na; 1200 ml  Code Status: Full Code    PT/OT Eval Status: PT consulted    Dispo - 1-2 days per clinical improvement    Jenkins Bumpers, APRN - CNP    Thank you Jessica for the opportunity to be involved in this patient's care.  If you have any questions or concerns please feel free to contact me at [phone].---Anticipated co-signer, Dr. Venkatesh Colbert    (Please note that portions of this note were completed with a voice recognition program. Efforts were made to edit the dictations but occasionally words are mis-transcribed.)

## 2022-11-28 NOTE — CARE COORDINATION
CASE MANAGEMENT INITIAL ASSESSMENT      Reviewed chart and completed assessment with patient:bedside  Family present: none  Explained Case Management role/services. yes    Primary contact information:[facility] Decision Maker :   Primary Decision Maker: Shalini Montes - [phone]          Can this person be reached and be able to respond quickly, such as within a few minutes or hours? Yes      Admit date/status:11/27/22/ Obs  Diagnosis:New onset A-Fib   Is this a Readmission?:  No      Insurance:BCBS Medicare   Precert required for SNF: Yes       3 night stay required: No    Living arrangements, Adls, care needs, prior to admission:Lives alone. IPTA    Durable Medical Equipment at home:  Walker_X_Cane__RTS__ BSC__Shower Chair__  02__ HHN__ CPAP__  BiPap__  Hospital Bed__ W/C___ Other__X rolater___    Services in the home and/or outpatient, prior to admission:none    Current PCP:Eb Bearden        Medications: Prescription coverage? Yes   Transportation needs: none         PT/OT recs:pending after neuro sees    Hospital Exemption Notification (HEN):needed for SNF    Barriers to discharge:lives alone    Plan/comments:Patient plans to return home alone. Patient is usually [place] uses a [employer] [place] and a Rolator if needed. Patient has an elevator with no steps. Patient doesn't drive but has family and neighbors to help with taking her to and from appts and getting meds. Patient is refusing SNF but [facility] with no [facility]. May have new O2 needs.       Bon Esteban RN

## 2022-11-28 NOTE — ED NOTES
Report called to [address] Pilar Loja RN of C5, all questions addressed. Pt aware of admission status with no questions verbalized. Pt states frequency of HA has decreased but is still present. Awaiting admitting RN for pt transport.       Roxi Yuan RN  11/28/22 4652

## 2022-11-28 NOTE — PROGRESS NOTES
4 Eyes Admission Assessment     I agree as the admission nurse that 2 RN's have performed a thorough Head to Toe Skin Assessment on the patient. ALL assessment sites listed below have been assessed on admission. Areas assessed by both nurses:   [x]   Head, Face, and Ears   [x]   Shoulders, Back, and Chest  [x]   Arms, Elbows, and Hands   [x]   Coccyx, Sacrum, and Ischium  [x]   Legs, Feet, and Heels        Does the Patient have Skin Breakdown?   No         Jarrod Prevention initiated:  No   Wound Care Orders initiated:  No      [facility] nurse consulted for Pressure Injury (Stage 3,4, Unstageable, DTI, NWPT, and Complex wounds) or Jarrod score 18 or lower:  No      Nurse 1 eSignature: Electronically signed by Fidencio Cohen RN on 11/28/22 at 4:17 AM EST    **SHARE this note so that the co-signing nurse is able to place an eSignature**    Nurse 2 eSignature: Electronically signed by Nikki Rocha RN on 11/28/22 at 4:18 AM EST

## 2022-11-29 VITALS
TEMPERATURE: 97.3 F | BODY MASS INDEX: 33.34 KG/M2 | HEART RATE: 101 BPM | WEIGHT: 176.59 LBS | RESPIRATION RATE: 16 BRPM | DIASTOLIC BLOOD PRESSURE: 70 MMHG | SYSTOLIC BLOOD PRESSURE: 104 MMHG | HEIGHT: 61 IN | OXYGEN SATURATION: 91 %

## 2022-11-29 LAB
ANION GAP SERPL CALCULATED.3IONS-SCNC: 13 MMOL/L (ref 3–16)
BASOPHILS ABSOLUTE: 0 K/UL (ref 0–0.2)
BASOPHILS RELATIVE PERCENT: 0.1 %
BUN BLDV-MCNC: 54 MG/DL (ref 7–20)
CALCIUM SERPL-MCNC: 9.4 MG/DL (ref 8.3–10.6)
CHLORIDE BLD-SCNC: 105 MMOL/L (ref 99–110)
CO2: 19 MMOL/L (ref 21–32)
CREAT SERPL-MCNC: 1.4 MG/DL (ref 0.6–1.2)
EOSINOPHILS ABSOLUTE: 0 K/UL (ref 0–0.6)
EOSINOPHILS RELATIVE PERCENT: 0 %
GFR SERPL CREATININE-BSD FRML MDRD: 35 ML/MIN/{1.73_M2}
GLUCOSE BLD-MCNC: 142 MG/DL (ref 70–99)
HCT VFR BLD CALC: 37.2 % (ref 36–48)
HEMOGLOBIN: 12.3 G/DL (ref 12–16)
LYMPHOCYTES ABSOLUTE: 1.1 K/UL (ref 1–5.1)
LYMPHOCYTES RELATIVE PERCENT: 16 %
MAGNESIUM: 2.4 MG/DL (ref 1.8–2.4)
MCH RBC QN AUTO: 31.4 PG (ref 26–34)
MCHC RBC AUTO-ENTMCNC: 33.1 G/DL (ref 31–36)
MCV RBC AUTO: 95.1 FL (ref 80–100)
MONOCYTES ABSOLUTE: 0.7 K/UL (ref 0–1.3)
MONOCYTES RELATIVE PERCENT: 10.4 %
NEUTROPHILS ABSOLUTE: 5.1 K/UL (ref 1.7–7.7)
NEUTROPHILS RELATIVE PERCENT: 73.5 %
PDW BLD-RTO: 14.1 % (ref 12.4–15.4)
PLATELET # BLD: 133 K/UL (ref 135–450)
PMV BLD AUTO: 9.8 FL (ref 5–10.5)
POTASSIUM SERPL-SCNC: 4.8 MMOL/L (ref 3.5–5.1)
RBC # BLD: 3.92 M/UL (ref 4–5.2)
SODIUM BLD-SCNC: 137 MMOL/L (ref 136–145)
WBC # BLD: 6.9 K/UL (ref 4–11)

## 2022-11-29 PROCEDURE — G0378 HOSPITAL OBSERVATION PER HR: HCPCS

## 2022-11-29 PROCEDURE — 85025 COMPLETE CBC W/AUTO DIFF WBC: CPT

## 2022-11-29 PROCEDURE — 83735 ASSAY OF MAGNESIUM: CPT

## 2022-11-29 PROCEDURE — 96376 TX/PRO/DX INJ SAME DRUG ADON: CPT

## 2022-11-29 PROCEDURE — 80048 BASIC METABOLIC PNL TOTAL CA: CPT

## 2022-11-29 PROCEDURE — 2580000003 HC RX 258: Performed by: NURSE PRACTITIONER

## 2022-11-29 PROCEDURE — 97116 GAIT TRAINING THERAPY: CPT

## 2022-11-29 PROCEDURE — 97161 PT EVAL LOW COMPLEX 20 MIN: CPT

## 2022-11-29 PROCEDURE — 6370000000 HC RX 637 (ALT 250 FOR IP): Performed by: NURSE PRACTITIONER

## 2022-11-29 PROCEDURE — 99214 OFFICE O/P EST MOD 30 MIN: CPT | Performed by: NURSE PRACTITIONER

## 2022-11-29 PROCEDURE — 97530 THERAPEUTIC ACTIVITIES: CPT

## 2022-11-29 PROCEDURE — 36415 COLL VENOUS BLD VENIPUNCTURE: CPT

## 2022-11-29 PROCEDURE — 6360000002 HC RX W HCPCS: Performed by: NURSE PRACTITIONER

## 2022-11-29 PROCEDURE — 96372 THER/PROPH/DIAG INJ SC/IM: CPT

## 2022-11-29 RX ORDER — METOPROLOL SUCCINATE 50 MG/1
50 TABLET, EXTENDED RELEASE ORAL DAILY
Qty: 30 TABLET | Refills: 0 | Status: SHIPPED | OUTPATIENT
Start: 2022-11-29

## 2022-11-29 RX ORDER — METHYLPREDNISOLONE 4 MG/1
TABLET ORAL
Qty: 1 KIT | Refills: 0 | Status: SHIPPED | OUTPATIENT
Start: 2022-11-29 | End: 2022-12-05

## 2022-11-29 RX ADMIN — VALSARTAN 40 MG: 80 TABLET ORAL at 08:59

## 2022-11-29 RX ADMIN — METHYLPREDNISOLONE SODIUM SUCCINATE 20 MG: 40 INJECTION, POWDER, FOR SOLUTION INTRAMUSCULAR; INTRAVENOUS at 09:00

## 2022-11-29 RX ADMIN — DOCUSATE SODIUM 100 MG: 100 CAPSULE, LIQUID FILLED ORAL at 08:59

## 2022-11-29 RX ADMIN — APIXABAN 2.5 MG: 2.5 TABLET, FILM COATED ORAL at 08:57

## 2022-11-29 RX ADMIN — METOPROLOL SUCCINATE 50 MG: 50 TABLET, EXTENDED RELEASE ORAL at 08:33

## 2022-11-29 RX ADMIN — Medication 10 ML: at 09:00

## 2022-11-29 RX ADMIN — SPIRONOLACTONE 25 MG: 25 TABLET ORAL at 08:33

## 2022-11-29 RX ADMIN — ALLOPURINOL 100 MG: 100 TABLET ORAL at 08:58

## 2022-11-29 RX ADMIN — ORPHENADRINE CITRATE 60 MG: 30 INJECTION INTRAMUSCULAR; INTRAVENOUS at 03:15

## 2022-11-29 RX ADMIN — ATORVASTATIN CALCIUM 20 MG: 10 TABLET, FILM COATED ORAL at 09:00

## 2022-11-29 RX ADMIN — LEVOTHYROXINE SODIUM 100 MCG: 0.1 TABLET ORAL at 07:01

## 2022-11-29 NOTE — DISCHARGE INSTR - COC
Continuity of Care Form    Patient Name: Chandler Moyer   :  1930  MRN:  [id number]    Admit date:  2022  Discharge date:  22    Code Status Order: Full Code   Advance Directives:     Admitting Physician:  Harris Negron MD  PCP: Lise العلي    Discharging Nurse: [facility]  Unit/Room#: 9312/9227-15  Discharging Unit Phone Number: [phone]    Emergency Contact:   Extended Emergency Contact Information  Primary Emergency Contact: Abida [address], [address] Phone: [phone]  Relation: Child  Preferred language: English   needed?  No  Secondary Emergency Contact: [address] Phone: [phone]  Mobile Phone: [phone]  Relation: Grandchild    Past Surgical History:  Past Surgical History:   Procedure Laterality Date    APPENDECTOMY      BREAST SURGERY      biopsy benign    CARDIAC SURGERY      stent    CATARACT REMOVAL WITH IMPLANT Right 10/11/2017    COLONOSCOPY  2013    HYSTERECTOMY (CERVIX STATUS UNKNOWN)      JOINT REPLACEMENT Left 14    LEFT TOTAL KNEE REPLACEMENT WITH FEMORAL NERVE BLOCK FOR PAIN    OTHER SURGICAL HISTORY Right 14    right total knee replacement    TONSILLECTOMY         Immunization History:   Immunization History   Administered Date(s) Administered    COVID-19, PFIZER GRAY top, DO NOT Dilute, (age 15 y+), IM, 30 mcg/0.3 mL 2022    COVID-19, PFIZER PURPLE top, DILUTE for use, (age 15 y+), 30mcg/0.3mL 2021, 2021, 2021       Active Problems:  Patient Active Problem List   Diagnosis Code    Anginal pain ([facility] [address] I20.9    CAD (coronary artery disease) I25.10    Dyspnea on exertion R06.09    Primary localized osteoarthrosis, lower leg M17.10    Left TKR Z96.659    HTN (hypertension) I10    Hyperlipidemia E78.5    Right TKR Z96.659    Trochanteric bursitis of right hip M70.61    Syncope and collapse R55    Acute coronary syndrome (HCC) I24.9    [facility] I48.0    SVT (supraventricular tachycardia) ([facility]) I47.1    Ischemic cardiomyopathy I25.5    Acute respiratory failure with hypoxia ([facility]) J96.01    Intractable headache, unspecified chronicity pattern, unspecified headache type R51.9    Occipital neuralgia of left side M54.81    Atrial fibrillation, chronic ([facility]) I48.20       Isolation/Infection:   Isolation            No Isolation          Patient Infection Status       Infection Onset Added Last Indicated Last Indicated By Review Planned Expiration Resolved Resolved By    None active    Resolved    COVID-19 (Rule Out) 03/09/21 03/09/21 03/09/21 COVID-19, Rapid (Ordered)   03/09/21 Rule-Out Test Resulted            Nurse Assessment:  Last Vital Signs: BP 91/61   Pulse 89   Temp 98 °F (36.7 °C) (Oral)   Resp 16   Ht 5' 1\" (1.549 m)   Wt 176 lb 9.4 oz (80.1 kg)   SpO2 95%   BMI 33.37 kg/m²     Last documented pain score (0-10 scale): Pain Level: 8  Last Weight:   Wt Readings from Last 1 Encounters:   11/28/22 176 lb 9.4 oz (80.1 kg)     Mental Status:  oriented and alert    IV Access:  - None    Nursing Mobility/ADLs:  Walking   Assisted  Transfer  Assisted  Bathing  Assisted  Dressing  Assisted  Toileting  Assisted  Feeding  Independent  Med Admin  Independent  Med Delivery   whole    Wound Care Documentation and Therapy:        Elimination:  Continence: Bowel: Yes  Bladder: Yes  Urinary Catheter: None   Colostomy/Ileostomy/Ileal Conduit: No       Date of Last BM: ***    Intake/Output Summary (Last 24 hours) at 11/29/2022 1444  Last data filed at 11/29/2022 0901  Gross per 24 hour   Intake 120 ml   Output --   Net 120 ml     No intake/output data recorded. Safety Concerns: At Risk for Falls    Impairments/Disabilities:      Hearing    Nutrition Therapy:  Current Nutrition Therapy:   - Oral Diet:  General    Routes of Feeding: Oral  Liquids:  Thin Liquids  Daily Fluid Restriction: no  Last Modified Barium Swallow with Video (Video Swallowing Test): not done    Treatments at the Time of Hospital Discharge:   Respiratory Treatments: ***  Oxygen Therapy:  is not on home oxygen therapy. Ventilator:    - No ventilator support    Rehab Therapies: Physical Therapy and Occupational Therapy  Weight Bearing Status/Restrictions: No weight bearing restrictions  Other Medical Equipment (for information only, NOT a DME order):  rolling walker   Other Treatments: ***    Patient's personal belongings (please select all that are sent with patient):  Sent with patient     RN SIGNATURE:  Electronically signed by Morgan Giron RN on 11/29/22 at 3:14 PM EST    CASE MANAGEMENT/SOCIAL WORK SECTION    Inpatient Status Date: ***    Readmission Risk Assessment Score:  Readmission Risk              Risk of Unplanned Readmission:  0           Discharging to Facility/ [address]   [address]   [phone]     Dialysis Facility (if applicable)   Name:  Address:  Dialysis Schedule:  Phone:  Fax:    / signature: {Esignature:[id number]}    PHYSICIAN SECTION    Prognosis: Good    Condition at Discharge: Stable    Rehab Potential (if transferring to Rehab): Good    Recommended Labs or Other Treatments After Discharge: PT, OT    Physician Certification: I certify the above information and transfer of Shanti Tejada  is necessary for the continuing treatment of the diagnosis listed and that she requires Home Care for greater 30 days.      Update Admission H&P: No change in H&P    PHYSICIAN SIGNATURE:  Electronically signed by Deepali Ridley MD on 11/29/22 at 5:04 PM EST

## 2022-11-29 NOTE — PROGRESS NOTES
[address] [facility],[address]  Neurology Follow-up  [facility] Neurology    Date of Service: 11/29/2022    Subjective:   CC: Follow up today regarding: Acute intractable headache    Events noted. Chart and lab reviewed. OOB to chair. Eating lunch. States her headache is 90% improved and she feels so much better. Notes she slept all night. Denies vision changes or loss. ROS : A 10-12 system review obtained and updated today and is unremarkable except as mentioned  in my interval history. family history includes Cancer in her brother.     Past Medical History:   Diagnosis Date    A-fib [facility])     Arthritis     CAD (coronary artery disease)     stent placed 2007    CHF (congestive heart failure) ([facility] [address]     Depression 1980    treated with electoshock successfully    Diphtheria     age 15    Gout     Hyperlipemia     Hypertension     STEMI (ST elevation myocardial infarction) ([facility] [address] 2007    Thyroid disease     Wears hearing aid     left ear     Current Facility-Administered Medications   Medication Dose Route Frequency Provider Last Rate Last Admin    allopurinol (ZYLOPRIM) tablet 100 mg  100 mg Oral Daily Peggy Pitcher, APRN - CNP   100 mg at 11/29/22 0858    apixaban (ELIQUIS) tablet 2.5 mg  2.5 mg Oral BID Peggy Pitcher, APRN - CNP   2.5 mg at 11/29/22 5421    aspirin chewable tablet 81 mg  81 mg Oral Daily Peggy Pitcher, APRN - CNP        atorvastatin (LIPITOR) tablet 20 mg  20 mg Oral Daily Peggy Pitcher, APRN - CNP   20 mg at 11/29/22 0900    docusate sodium (COLACE) capsule 100 mg  100 mg Oral BID Peggy Pitcher, APRN - CNP   100 mg at 11/29/22 0859    levothyroxine (SYNTHROID) tablet 100 mcg  100 mcg Oral Daily Peggy Pitcher, APRN - CNP   100 mcg at 11/29/22 0701    methenamine (MANDELAMINE) tablet 1 g  1 g Oral BID Peggy Pitcher, APRN - CNP        metoprolol succinate (TOPROL XL) extended release tablet 50 mg  50 mg Oral BID Peggy Pitcher, APRN - CNP   50 mg at 11/29/22 0907    spironolactone (ALDACTONE) tablet 25 mg  25 mg Oral Daily Aleena De Dios, APRN - CNP   25 mg at 11/29/22 2478    valsartan (DIOVAN) tablet 40 mg  40 mg Oral Daily Aleena Mahoning, APRN - CNP   40 mg at 11/29/22 0859    sodium chloride flush 0.9 % injection 5-40 mL  5-40 mL IntraVENous 2 times per day Aleena De Dios, APRN - CNP   10 mL at 11/29/22 0900    sodium chloride flush 0.9 % injection 5-40 mL  5-40 mL IntraVENous PRN Aleena Mahoning, APRN - CNP        0.9 % sodium chloride infusion   IntraVENous PRN Aleena Mahoning, APRN - CNP        ondansetron (ZOFRAN-ODT) disintegrating tablet 4 mg  4 mg Oral Q8H PRN Aleena Mahoning, APRN - CNP        Or    ondansetron (ZOFRAN) injection 4 mg  4 mg IntraVENous Q6H PRN Aleenajunior De Dios, APRN - CNP        polyethylene glycol (GLYCOLAX) packet 17 g  17 g Oral Daily PRN Aleenajunior De Dios, APRN - CNP        acetaminophen (TYLENOL) tablet 650 mg  650 mg Oral Q6H PRN Aleena Mahoning, APRN - CNP        Or    acetaminophen (TYLENOL) suppository 650 mg  650 mg Rectal Q6H PRN Aleena De Dios, APRN - CNP        traMADol (ULTRAM) tablet 50 mg  50 mg Oral Q6H PRN Aleena Mahoning, APRN - CNP        orphenadrine (NORFLEX) injection 60 mg  60 mg IntraMUSCular Q12H Aleenajunior De Dios, APRN - CNP   60 mg at 11/29/22 0315    methylPREDNISolone sodium (SOLU-MEDROL) injection 20 mg  20 mg IntraVENous Daily Ivis Russellna, APRN - CNP   20 mg at 11/29/22 0900    acetaminophen (TYLENOL) tablet 650 mg  650 mg Oral Once Zari E Roetting, APRN - CNP         Allergies   Allergen Reactions    Ace Inhibitors Other (See Comments)     cough    Amoxicillin Hives     Hives    Bactrim [Sulfamethoxazole-Trimethoprim]     Ciprofloxacin     Clindamycin/Lincomycin     Naproxen Nausea Only     Nausea and stomach pain    Other Hallucinations     opiods    Statins Other (See Comments)     myalgia  unknown    Hydrochlorothiazide Rash    Sulfa Antibiotics Rash     rash  Blistering rash      reports that she quit smoking about 22 years ago. Her smoking use included cigarettes. She has a 20.00 pack-year smoking history. She has never used smokeless tobacco. She reports that she does not drink alcohol and does not use drugs. Objective:  Exam:   Constitutional:   Vitals:    11/29/22 0319 11/29/22 0820 11/29/22 0921 11/29/22 1158   BP:  108/68 (!) 102/59 91/61   Pulse:  95 93 89   Resp: 18 14  16   Temp: 97.8 °F (36.6 °C) 97.4 °F (36.3 °C)  98 °F (36.7 °C)   TempSrc: Oral Oral  Oral   SpO2: 93% 92% 94% 95%   Weight:       Height:         General appearance:  Normal development and appear in no acute distress. Mental Status:   Oriented to person, place, problem, and time. Memory: Good immediate recall. Intact remote memory  Normal attention span and concentration. Language: intact naming, repeating and fluency   Good fund of Knowledge. Cranial Nerves:   II: Visual fields: Full. Pupils: equal, round, reactive to light  III,IV,VI: Extra Ocular Movements are intact. No nystagmus  V: Facial sensation is intact  VII: Facial strength and movements: intact and symmetric  IX: Palate elevation is symmetric  XI: Shoulder shrug is intact  XII: Tongue movements are normal  Musculoskeletal: 5/5 in all 4 extremities. Tone: Normal tone. Reflexes: Symmetric 2+ in both arms and legs. Coordination: no pronator drift, no dysmetria with FNF  Sensation: normal to all modalities in both arms and legs.   Gait/Posture: steady gait        Data:  LABS:   Lab Results   Component Value Date/Time     11/29/2022 07:00 AM    K 4.8 11/29/2022 07:00 AM    K 4.6 11/27/2022 06:14 PM     11/29/2022 07:00 AM    CO2 19 11/29/2022 07:00 AM    BUN 54 11/29/2022 07:00 AM    CREATININE 1.4 11/29/2022 07:00 AM    GFRAA 56 03/13/2021 08:12 AM    GFRAA >60 09/30/2012 02:33 PM    LABGLOM 35 11/29/2022 07:00 AM    GLUCOSE 142 11/29/2022 07:00 AM    MG 2.40 11/29/2022 07:00 AM    CALCIUM 9.4 11/29/2022 07:00 AM     Lab Results   Component Value Date/Time    WBC 6.9 11/29/2022 07:00 AM    RBC 3.92 11/29/2022 07:00 AM    HGB 12.3 11/29/2022 07:00 AM    HCT 37.2 11/29/2022 07:00 AM    MCV 95.1 11/29/2022 07:00 AM    RDW 14.1 11/29/2022 07:00 AM     11/29/2022 07:00 AM     Lab Results   Component Value Date    INR 1.10 08/05/2018    PROTIME 12.5 08/05/2018       Neuroimaging was independently reviewed by me and discussed results with the patient  I reviewed blood testing and other test results and discussed results with the patient      Impression:    Acute intractable headache - denies vision loss, but initial concern for GCA given elevated inflammatory markers. CT head negative. MRI of brain negative. Severe spinal stenosis C5-6   A. Fib on Eliquis  HTN  Elevated inflammatory markers    Recommendation    Monitor on tele. Continue Eliquis, statin. Continue home BP meds. Seems less likely GCA at this point and more likely musculoskeletal and headache from severe pain. She notes yesterday she was unable to move her neck due to severe pain, which has now resolved. Marlee Espinosa CNP      This dictation was generated by voice recognition computer software. Although all attempts are made to edit the dictation for accuracy, there may be errors in the transcription that are not intended.

## 2022-11-29 NOTE — PROGRESS NOTES
Physician to Follow Referral: Mirian Marshall    I certify that I, or a nurse practitioner or physician assistant working with me, had an in-person encounter with Lui Cristobal on 11/29/2022 and the reason for the home care services is documented in the clinical note for that day. Lui Cristobal was assessed on the above date and was found to have medical conditions requiring Home Care, including the following:   Principal Problem:    Intractable headache, unspecified chronicity pattern, unspecified headache type  Active Problems:    Occipital neuralgia of left side    Atrial fibrillation, chronic (HCC)  Resolved Problems:    * No resolved hospital problems. *      Homebound Status: further, I certify that my clinical findings support that Lui Cristobal does meet the Medicare definition of \"Confined to the Home\" for reasons including being deconditioned due to medical condition.      Certification and medical necessity: I certify that, based on my findings, the following services are medically necessary home health services for Lui Cristobal for the following reasons: Vital signs, medication compliance and education, PT/OT, wound care, teaching and management of medical conditions, etc.

## 2022-11-29 NOTE — CARE COORDINATION
General acute hospital    Referral received from  to follow for home care services.    [facility] unable to accept    Referral sent to [address] [place] [address]; accepted by Jaquan Dean    Patient has no preference in agency    [address] [place] [address], JESSICA CTN  General acute hospital [phone]

## 2022-11-29 NOTE — DISCHARGE SUMMARY
Hospital Medicine Discharge Summary    Patient ID: [id number] [facility],[address]      Patient's PCP: Alka العلي    Admit Date: 11/27/2022     Discharge Date:   11/29/22    Admitting Provider: Anthony Michael MD     Discharge Provider: Alan Nelson MD     Discharge Diagnoses: Active Hospital Problems    Diagnosis     Intractable headache, unspecified chronicity pattern, unspecified headache type [R51.9]      Priority: Medium    Occipital neuralgia of left side [M54.81]      Priority: Medium    Atrial fibrillation, chronic (HCC) [I48.20]      Priority: Medium       The patient was seen and examined on day of discharge and this discharge summary is in conjunction with any daily progress note from day of discharge. Hospital Course:     [address] [facility],[address] is a 27-year-old female with significant past medical history of hypertension, hyperlipidemia, gouty arthritis, CAD, atrial fibrillation, and congestive heart failure who presents to [facility] emergency department with complaint of severe left neck pain that radiates into the left side of her head. She describes the pain as sharp, stabbing, 10 out of 10, comes and goes, feels like lightning bolts in her scalp. She states that she has a history of neck injury in the 1960s that was not treated so occasionally she would have these type of symptoms but this episode is worse. Her daughter notes that she spent Thanksgiving taking [place] pastries so required a lot of bending, standing, and using arms and shoulders. Her daughter feels that she may have overdone it. This has happened to nearly this degree 1 other episode about a year ago when she was lifting a nightstand table. Patient denies any visual changes, no weakness or numbness especially in the extremities. Symptoms are isolated to back of left neck and left occiput. Her evaluation here included laboratory studies, EKG, chest x-ray, CT of the head, and CTA of the head neck.   Both CTs were negative for any acute findings; chronic changes were noted though. Chest x-ray did show mild cardiomegaly with bibasilar atelectasis but otherwise no acute findings. Pertinent lab values tonight show sodium 130, chloride 98, BUN 41, creatinine 1.4, GFR 35, and a blood sugar 117. Troponin was less than 0.01. Cell count was unremarkable for any acute leukocytosis/leukopenia, acute anemia, or acute platelet abnormalities. Urinalysis showed normal metrics aside from trace leukocyte esterase; microscopy did not reflect any pyuria. Patient was given diazepam and a lidocaine infusion in the emergency department with modest improvement of her pain. Currently still complains of lightening bolt-like sensation shooting from her neck into her scalp, pain currently 7 out of 10. Headache - likely MSK and MRI reveals extensive OA, foraminal stenosis. Neurology following. ESR and CRP elevated. Ruled out GCA. Started steroids - IV solumedrol - 20mg due to patient and daughters request as steroids cause side effects for patient. MRI reviewed. Symptoms improved. Will discharge on medrol dose pack. Atrial Fibrillation  - Rate controlled  - Continue Eliquis and metoprolol     History of CHF  - CHF order sets in place  - Continue GDMT with BB, ARB, and MRA     CKD, Stage 3  - Cr 1.4, baseline 1.1, mostly at baseline     Gout  - Continued allopurinol     Hypothyroidism  - Continued Synthroid      Physical Exam Performed:     BP 91/61   Pulse 89   Temp 98 °F (36.7 °C) (Oral)   Resp 16   Ht 5' 1\" (1.549 m)   Wt 176 lb 9.4 oz (80.1 kg)   SpO2 95%   BMI 33.37 kg/m²       General appearance:  No apparent distress, appears stated age and cooperative. HEENT:  Normal cephalic, atraumatic without obvious deformity. Pupils equal, round, and reactive to light. Extra ocular muscles intact. Conjunctivae/corneas clear. Neck: Supple, with full range of motion. No jugular venous distention. Trachea midline.   Respiratory: Normal respiratory effort. Clear to auscultation, bilaterally without Rales/Wheezes/Rhonchi. Cardiovascular:  Regular rate and rhythm with normal S1/S2 without murmurs, rubs or gallops. Abdomen: Soft, non-tender, non-distended with normal bowel sounds. Musculoskeletal:  Tenderness across posterior neck. Skin: Skin color, texture, turgor normal.  No rashes or lesions. Neurologic:  Neurovascularly intact without any focal sensory/motor deficits. Cranial nerves: II-XII intact, grossly non-focal.  Psychiatric:  Alert and oriented, thought content appropriate, normal insight  Capillary Refill: Brisk,< 3 seconds   Peripheral Pulses: +2 palpable, equal bilaterally       Labs: For convenience and continuity at follow-up the following most recent labs are provided:      CBC:    Lab Results   Component Value Date/Time    WBC 6.9 11/29/2022 07:00 AM    HGB 12.3 11/29/2022 07:00 AM    HCT 37.2 11/29/2022 07:00 AM     11/29/2022 07:00 AM       Renal:    Lab Results   Component Value Date/Time     11/29/2022 07:00 AM    K 4.8 11/29/2022 07:00 AM    K 4.6 11/27/2022 06:14 PM     11/29/2022 07:00 AM    CO2 19 11/29/2022 07:00 AM    BUN 54 11/29/2022 07:00 AM    CREATININE 1.4 11/29/2022 07:00 AM    CALCIUM 9.4 11/29/2022 07:00 AM         Significant Diagnostic Studies    Radiology:   MRI CERVICAL SPINE WO CONTRAST   Final Result   No acute infarct or other acute intracranial process. Senescent changes including moderate chronic small vessel disease and mild   generalized parenchymal volume loss. Multilevel cervical spondylosis resulting in upwards of moderate canal   narrowing at C5-6 and C6-7. Severe left-sided foraminal stenosis at C5-6. MRI BRAIN WO CONTRAST   Final Result   No acute infarct or other acute intracranial process. Senescent changes including moderate chronic small vessel disease and mild   generalized parenchymal volume loss.       Multilevel cervical spondylosis resulting in upwards of moderate canal   narrowing at C5-6 and C6-7. Severe left-sided foraminal stenosis at C5-6. CTA HEAD NECK W CONTRAST   Final Result   1. No acute intracranial process identified. 2. Mild diffuse cerebral volume loss and mild chronic small vessel ischemic   changes. 3. Atherosclerosis without significant arterial stenosis identified within   the neck. 4. No large vessel occlusion, significant stenosis or cerebral aneurysm   identified within the brain. CT HEAD WO CONTRAST   Final Result   1. No acute intracranial process identified. 2. Mild diffuse cerebral volume loss and mild chronic small vessel ischemic   changes. 3. Atherosclerosis without significant arterial stenosis identified within   the neck. 4. No large vessel occlusion, significant stenosis or cerebral aneurysm   identified within the brain. XR CHEST PORTABLE   Final Result   Mild cardiomegaly with bibasilar atelectasis. Consults:     IP CONSULT TO HEART FAILURE NURSE/COORDINATOR  IP CONSULT TO DIETITIAN  IP CONSULT TO NEUROLOGY    Disposition:  Home    Condition at Discharge: Stable    Discharge Instructions/Follow-up:  PCP    Code Status:  Full Code     Activity: activity as tolerated    Diet: regular diet      Discharge Medications:     Current Discharge Medication List             Details   methylPREDNISolone (MEDROL DOSEPACK) 4 MG tablet Take by mouth.   Qty: 1 kit, Refills: 0                Details   metoprolol succinate (TOPROL XL) 50 MG extended release tablet Take 1 tablet by mouth daily  Qty: 30 tablet, Refills: 0                Details   guaiFENesin (MUCINEX) 600 MG extended release tablet Take 1 tablet by mouth 2 times daily  Qty: 60 tablet, Refills: 1      aspirin 81 MG chewable tablet Take 1 tablet by mouth daily  Qty: 30 tablet, Refills: 3      apixaban (ELIQUIS) 2.5 MG TABS tablet Take 1 tablet by mouth 2 times daily  Qty: 60 tablet, Refills: 0      lidocaine (LIDODERM) 5 % Place 2 patches onto the skin daily 12 hours on, 12 hours off. Qty: 30 patch, Refills: 0      spironolactone (ALDACTONE) 25 MG tablet Take 1 tablet by mouth daily  Qty: 30 tablet, Refills: 3      Acetaminophen 325 MG CAPS Take 650 mg by mouth 3 times daily      hydrocortisone (ANUSOL-HC) 2.5 % rectal cream Place rectally 2 times daily Place rectally 2 times daily. psyllium (KONSYL) 28.3 % PACK Take 1 packet by mouth daily      atorvastatin (LIPITOR) 20 MG tablet Take 20 mg by mouth daily      methenamine (MANDELAMINE) 1 g tablet Take 1 g by mouth 2 times daily      Cyanocobalamin (VITAMIN B 12 PO) Take 1,000 Units by mouth daily       allopurinol (ZYLOPRIM) 100 MG tablet Take 100 mg by mouth daily      docusate sodium (COLACE) 100 MG capsule Take 100 mg by mouth 2 times daily      nitroGLYCERIN (NITROSTAT) 0.4 MG SL tablet Place 0.4 mg under the tongue every 5 minutes as needed (never  used). meclizine (ANTIVERT) 25 MG CHEW Take 25 mg by mouth 3 times daily as needed (not used recently). levothyroxine (SYNTHROID) 100 MCG tablet Take 100 mcg by mouth daily. Time Spent on discharge: 30 in the examination, evaluation, counseling and review of medications and discharge plan. Signed:    Liya Alston MD   11/29/2022      Thank you Jessica for the opportunity to be involved in this patient's care. If you have any questions or concerns, please feel free to contact me at [phone].

## 2022-11-29 NOTE — PROGRESS NOTES
Physical Therapy  Facility/Department: [place] [address] - MED SURG/ORTHO  Physical Therapy Initial Assessment    Name: Eliud Shukla  : 1930  MRN: [id number]  Date of Service: 2022    Discharge Recommendations:  24 hour supervision or assist, Home with Home health PT, Home with assist PRN   PT Equipment Recommendations  Equipment Needed: No  Other: pt owns recommended 2-wheeled Rolling Walker      Patient Diagnosis(es): The primary encounter diagnosis was Short lasting unilateral neuralgiform headache with conjunctival injection and tearing (SUNCT), intractable. A diagnosis of New onset a-fib [facility]) was also pertinent to this visit. Past Medical History:  has a past medical history of A-fib ([facility] Arthritis, CAD (coronary artery disease), CHF (congestive heart failure) ([facility] Depression, Diphtheria, Gout, Hyperlipemia, Hypertension, STEMI (ST elevation myocardial infarction) ([facility] Thyroid disease, and Wears hearing aid. Past Surgical History:  has a past surgical history that includes Hysterectomy; Appendectomy; Tonsillectomy; Colonoscopy (2013); Breast surgery; Cardiac surgery (); joint replacement (Left, 14); other surgical history (Right, 14); and Cataract removal with implant (Right, 10/11/2017). Assessment   Body Structures, Functions, Activity Limitations Requiring Skilled Therapeutic Intervention: Decreased functional mobility ; Decreased endurance;Decreased posture  Assessment: pt is 79 yo female admit to [facility] 2/2 intractable HA : PMH: afib, h/o B TKR; [place] except for receiving Meals on Wheels and owns a RW that she uses in community, pt presents requiring CGA for maximal safety for ambulating 80 ft with RW, pt with low BP -see act mushtaq section- but pt is asymptomatic, RN aware, pt will benefit from Acute Inpatient Skilled PT to address functional mobility deficits, PT recommends pt return home with initial 24hr supervision and progress to Sup PRN, rec HHPT  Treatment Diagnosis: decreased functional mobiltiy  Therapy Prognosis: Excellent  Decision Making: Low Complexity  Barriers to Learning: none  Requires PT Follow-Up: Yes  Activity Tolerance  Activity Tolerance: Patient tolerated evaluation without incident  Activity Tolerance Comments: asymptomatic low BP throughout session - sitting eob 112/72, sitting after gait assess 106/50, sitting after rest break 110/76; HR fluctuates throughout session - lowest 87 bpm, highest 127 bpm     Plan   Physcial Therapy Plan  General Plan: 3-5 times per week  Current Treatment Recommendations: Strengthening, ROM, Balance training, Functional mobility training, Transfer training, Gait training, Neuromuscular re-education, Home exercise program, Safety education & training, Patient/Caregiver education & training, Equipment evaluation, education, & procurement, Modalities, Positioning, Therapeutic activities  Safety Devices  Type of Devices:  All fall risk precautions in place, Call light within reach, Chair alarm in place, Gait belt, Left in chair, Nurse notified     Restrictions  Restrictions/Precautions  Restrictions/Precautions: General Precautions  Position Activity Restriction  Other position/activity restrictions: up with assist     Subjective   Pain: 5/10 spotty pain in head, difficulty pinpointing, dull - at rest semi-nieves in bed at start of session  General  Chart Reviewed: Yes  Patient assessed for rehabilitation services?: Yes  Additional Pertinent Hx: afib  Response To Previous Treatment: Not applicable  Family / Caregiver Present: No  Referring Practitioner: Delilah Manjarrez  Referral Date : 11/28/22  Diagnosis: intractable HA  Follows Commands: Within Functional Limits  General Comment  Comments: RN cleared pt for PT eval  Subjective  Subjective: pt highly motivated to participate         Social/Functional History  Social/Functional History  Lives With: Alone  Type of Home: Senior housing apartment  Home Layout: One level  Home Access: Level entry, Elevator  Bathroom Shower/Tub: Walk-in shower, Shower chair without back  Bathroom Toilet: Handicap height  Bathroom Equipment: Grab bars in shower, Grab bars around toilet, Hand-held shower  Bathroom Accessibility: Accessible  Home Equipment: Alert Button, Rollator, Walker, rolling (electric recliner, adjustable bed)  Has the patient had two or more falls in the past year or any fall with injury in the past year?: No  ADL Assistance: [address] [place] [address] Avenue: Independent ([address] [place] [address] [address])  Homemaking Responsibilities:  (dtr assists with cleaning if needed, neighbors help if needed)  Ambulation Assistance: Independent (USES rw PRIMARILY, FURNITURE WALKS IN APT)  Transfer Assistance: Independent  Active : No  Patient's  Info: Daughter drives  Leisure & Hobbies: walks with RW to bingo or other facility activities  Vision/Hearing  Vision  Vision: Impaired  Vision Exceptions: Wears glasses for reading  Hearing  Hearing: Exceptions to [facility]  Hearing Exceptions: Left hearing aid    Cognition   Orientation  Overall Orientation Status: Within Functional Limits  Orientation Level: Oriented to place;Oriented to time;Oriented to person;Oriented to situation  Cognition  Overall Cognitive Status: WFL     Objective   Heart Rate: 93  Heart Rate Source: Monitor  BP: (!) 102/59  BP Location: Left upper arm  BP Method: Automatic  Patient Position: Semi fowlers  MAP (Calculated): 73  Resp: 14  SpO2: 94 %  O2 Device: None (Room air)     Observation/Palpation  Posture: Fair  Observation: forward head in standing  Gross Assessment  AROM: Within functional limits  PROM: Within functional limits  Strength:  Within functional limits  Coordination: Within functional limits  Tone: Normal  Sensation: Impaired (intact to touch, pt reports neuropathy B feet during gait assessment)                    Bed mobility  Rolling to Left: Stand by assistance (used bed rail)  Supine to Sit: Stand by assistance (verbal cues for technique, used bed rail with hob elevated)  Sit to Supine:  (pt in chair at end of session)  Scooting: Stand by assistance  Transfers  Sit to Stand: Stand by assistance  Stand to Sit: Stand by assistance  Bed to Chair: Stand by assistance  Ambulation  Surface: Level tile  Device: Rolling Walker  Assistance: Contact guard assistance  Quality of Gait: pt reports uses furniture for support in apt, reports baseline gait pattern  Gait Deviations: Slow Cammie;Decreased step length;Decreased step height  Distance: 80ft + 25ft + 25ft  Comments: pt denies dizziness and pain; states \"it just feels good to wallk\"  Stairs/Curb  Stairs?: No     Balance  Posture: Fair (forward head)  Sitting - Static: Good  Sitting - Dynamic: Good  Standing - Static: Fair;+  Standing - Dynamic: Fair;+  Dynamic Sitting Balance Exercises: lateral shifting without B UE support x 2 mins  Static Standing Balance Exercises: without UE support, cues for posture, x 1 min          AM-PAC Score  AM-PAC Inpatient Mobility Raw Score : 21 (11/29/22 1036)  AM-PAC Inpatient T-Scale Score : 50.25 (11/29/22 1036)  Mobility Inpatient CMS 0-100% Score: 28.97 (11/29/22 1036)  Mobility Inpatient CMS G-Code Modifier : CJ (11/29/22 1036)          Goals  Short Term Goals  Time Frame for Short Term Goals: one week 12/6 unless otherwise indicated  Short Term Goal 1: pt will transfer supine to and from sit with ind  Short Term Goal 2: pt will transfer sit to and from stand with mod ind  Short Term Goal 3: pt will ambulate 150ft with RW with mod ind  Short Term Goal 4: pt will demonstrate and tolerate 10 reps of 3 B LE therex  Patient Goals   Patient Goals : to walk around the room - goal met       Education  Patient Education  Education Given To: Patient  Education Provided: Role of Therapy;Plan of Care;Home Exercise Program;Transfer Training;Equipment; Fall Prevention Strategies  Education Provided Comments: educated pt regarding benefit of using RW versus 4ww  Education Method: Demonstration;Verbal  Barriers to Learning: None  Education Outcome: Verbalized understanding;Demonstrated understanding      Therapy Time   Individual Concurrent Group Co-treatment   Time In 0911         Time Out 1011         Minutes 60         Timed Code Treatment Minutes: 45 Minutes     If pt is unable to be seen after this session, please let this note serve as discharge summary. Please see case management note for discharge disposition. Thank you.    Marbella Ro, PT

## 2022-12-01 ENCOUNTER — FOLLOWUP TELEPHONE ENCOUNTER (OUTPATIENT)
Dept: TELEMETRY | Age: 87
End: 2022-12-01

## 2022-12-01 NOTE — TELEPHONE ENCOUNTER
Care Transitions Initial Follow Up Call    Call within 2 business days of discharge: Yes     Patient: Diana Osorio Patient : 1930 MRN: [id number]    [unfilled]    RARS: No data recorded     Spoke with: patient    Discharge department/facility: home    Non-face-to-face services provided:  Scheduled appointment with [facility]    Yamilet Craig therapy in home during call. Pt states she is doing well. Denies any needs. Follow Up  No future appointments.     Angie Mcnulty RN

## 2022-12-02 NOTE — ED PROVIDER NOTES
Attending Supervisory Note/Shared Visit   I have personally performed a face to face diagnostic evaluation on this patient. I have reviewed the mid-levels findings and agree. History and Exam by me shows well-appearing female no acute distress. Patient presents the ED for evaluation of headache as well as neck pain. Denied changes in vision or neck stiffness. Denies focal logical deficits. Reports episode of emesis a few days prior to evaluation but currently denies nausea or vomiting. Denies changes in vision. Reports recent episode of epistasis which resolved spontaneously. For evaluation patient resting comfortably. She is noted to have an irregular rate and rhythm consistent with atrial fibrillation. Patient's blood pressures are labile. Patient has regular respiratory effort. Strength sensation tact in all extremities. Cranial nerves grossly intact. Patient was initially seen by advanced previous provider. Laboratory work-up demonstrated borderline hyponatremia. Renal function slightly elevated compared to baseline. no emergent laboratory normalities. CT and CTA of the head and neck unremarkable. Patient appears to be in atrial fibrillation and will be admitted for further medical management evaluation. I agree with the NESTOR plan of care. Please NESTOR Note for full ED course and final disposition. Disposition:  1. Short lasting unilateral neuralgiform headache with conjunctival injection and tearing (SUNCT), intractable    2.  New onset a-fib ([address]          Connie Rodrigues MD  Attending Emergency Physician       Connie Rodrigues MD  12/02/22 [id number]

## 2023-08-23 ENCOUNTER — HOSPITAL ENCOUNTER (INPATIENT)
Age: 88
LOS: 6 days | Discharge: HOME HEALTH | DRG: 291 | End: 2023-08-29
Attending: INTERNAL MEDICINE | Admitting: INTERNAL MEDICINE
Payer: MEDICARE

## 2023-08-23 ENCOUNTER — APPOINTMENT (OUTPATIENT)
Dept: GENERAL RADIOLOGY | Age: 88
DRG: 291 | End: 2023-08-23
Payer: MEDICARE

## 2023-08-23 ENCOUNTER — APPOINTMENT (OUTPATIENT)
Dept: CT IMAGING | Age: 88
DRG: 291 | End: 2023-08-23
Payer: MEDICARE

## 2023-08-23 DIAGNOSIS — J96.01 ACUTE RESPIRATORY FAILURE WITH HYPOXIA (HCC): ICD-10-CM

## 2023-08-23 DIAGNOSIS — N39.0 URINARY TRACT INFECTION WITH HEMATURIA, SITE UNSPECIFIED: Primary | ICD-10-CM

## 2023-08-23 DIAGNOSIS — I50.9 ACUTE ON CHRONIC CONGESTIVE HEART FAILURE, UNSPECIFIED HEART FAILURE TYPE (HCC): ICD-10-CM

## 2023-08-23 DIAGNOSIS — R31.9 URINARY TRACT INFECTION WITH HEMATURIA, SITE UNSPECIFIED: Primary | ICD-10-CM

## 2023-08-23 PROBLEM — I50.43 ACUTE ON CHRONIC COMBINED SYSTOLIC AND DIASTOLIC CHF (CONGESTIVE HEART FAILURE) (HCC): Status: ACTIVE | Noted: 2023-08-23

## 2023-08-23 LAB
ALBUMIN SERPL-MCNC: 3.5 G/DL (ref 3.4–5)
ALBUMIN/GLOB SERPL: 1 {RATIO} (ref 1.1–2.2)
ALP SERPL-CCNC: 69 U/L (ref 40–129)
ALT SERPL-CCNC: 12 U/L (ref 10–40)
ANION GAP SERPL CALCULATED.3IONS-SCNC: 9 MMOL/L (ref 3–16)
AST SERPL-CCNC: 20 U/L (ref 15–37)
BACTERIA URNS QL MICRO: ABNORMAL /HPF
BASOPHILS # BLD: 0 K/UL (ref 0–0.2)
BASOPHILS NFR BLD: 0.6 %
BILIRUB SERPL-MCNC: 0.5 MG/DL (ref 0–1)
BILIRUB UR QL STRIP.AUTO: NEGATIVE
BUN SERPL-MCNC: 17 MG/DL (ref 7–20)
CALCIUM SERPL-MCNC: 9.5 MG/DL (ref 8.3–10.6)
CHLORIDE SERPL-SCNC: 102 MMOL/L (ref 99–110)
CLARITY UR: ABNORMAL
CO2 SERPL-SCNC: 23 MMOL/L (ref 21–32)
COLOR UR: ABNORMAL
CREAT SERPL-MCNC: 1.1 MG/DL (ref 0.6–1.2)
DEPRECATED RDW RBC AUTO: 14.8 % (ref 12.4–15.4)
EOSINOPHIL # BLD: 0.1 K/UL (ref 0–0.6)
EOSINOPHIL NFR BLD: 1.6 %
EPI CELLS #/AREA URNS HPF: ABNORMAL /HPF (ref 0–5)
GFR SERPLBLD CREATININE-BSD FMLA CKD-EPI: 47 ML/MIN/{1.73_M2}
GLUCOSE SERPL-MCNC: 107 MG/DL (ref 70–99)
GLUCOSE UR STRIP.AUTO-MCNC: NEGATIVE MG/DL
HCT VFR BLD AUTO: 33.5 % (ref 36–48)
HGB BLD-MCNC: 11.1 G/DL (ref 12–16)
HGB UR QL STRIP.AUTO: ABNORMAL
KETONES UR STRIP.AUTO-MCNC: NEGATIVE MG/DL
LEUKOCYTE ESTERASE UR QL STRIP.AUTO: ABNORMAL
LYMPHOCYTES # BLD: 1.6 K/UL (ref 1–5.1)
LYMPHOCYTES NFR BLD: 30.4 %
MCH RBC QN AUTO: 30.8 PG (ref 26–34)
MCHC RBC AUTO-ENTMCNC: 33 G/DL (ref 31–36)
MCV RBC AUTO: 93.3 FL (ref 80–100)
MONOCYTES # BLD: 0.6 K/UL (ref 0–1.3)
MONOCYTES NFR BLD: 11.3 %
NEUTROPHILS # BLD: 3 K/UL (ref 1.7–7.7)
NEUTROPHILS NFR BLD: 56.1 %
NITRITE UR QL STRIP.AUTO: POSITIVE
NT-PROBNP SERPL-MCNC: 5289 PG/ML (ref 0–449)
PH UR STRIP.AUTO: 6.5 [PH] (ref 5–8)
PLATELET # BLD AUTO: 128 K/UL (ref 135–450)
PMV BLD AUTO: 9.2 FL (ref 5–10.5)
POTASSIUM SERPL-SCNC: 4 MMOL/L (ref 3.5–5.1)
PROT SERPL-MCNC: 7 G/DL (ref 6.4–8.2)
PROT UR STRIP.AUTO-MCNC: NEGATIVE MG/DL
RBC # BLD AUTO: 3.6 M/UL (ref 4–5.2)
RBC #/AREA URNS HPF: ABNORMAL /HPF (ref 0–4)
SODIUM SERPL-SCNC: 134 MMOL/L (ref 136–145)
SP GR UR STRIP.AUTO: <=1.005 (ref 1–1.03)
TROPONIN, HIGH SENSITIVITY: 16 NG/L (ref 0–14)
TROPONIN, HIGH SENSITIVITY: 17 NG/L (ref 0–14)
UA DIPSTICK W REFLEX MICRO PNL UR: YES
URN SPEC COLLECT METH UR: ABNORMAL
UROBILINOGEN UR STRIP-ACNC: 0.2 E.U./DL
WBC # BLD AUTO: 5.4 K/UL (ref 4–11)
WBC #/AREA URNS HPF: >100 /HPF (ref 0–5)

## 2023-08-23 PROCEDURE — 6360000002 HC RX W HCPCS: Performed by: PHYSICIAN ASSISTANT

## 2023-08-23 PROCEDURE — 84484 ASSAY OF TROPONIN QUANT: CPT

## 2023-08-23 PROCEDURE — 96365 THER/PROPH/DIAG IV INF INIT: CPT

## 2023-08-23 PROCEDURE — 2700000000 HC OXYGEN THERAPY PER DAY

## 2023-08-23 PROCEDURE — 1200000000 HC SEMI PRIVATE

## 2023-08-23 PROCEDURE — 36415 COLL VENOUS BLD VENIPUNCTURE: CPT

## 2023-08-23 PROCEDURE — 83880 ASSAY OF NATRIURETIC PEPTIDE: CPT

## 2023-08-23 PROCEDURE — 93005 ELECTROCARDIOGRAM TRACING: CPT | Performed by: INTERNAL MEDICINE

## 2023-08-23 PROCEDURE — 2580000003 HC RX 258: Performed by: PHYSICIAN ASSISTANT

## 2023-08-23 PROCEDURE — 2060000000 HC ICU INTERMEDIATE R&B

## 2023-08-23 PROCEDURE — 85025 COMPLETE CBC W/AUTO DIFF WBC: CPT

## 2023-08-23 PROCEDURE — 74176 CT ABD & PELVIS W/O CONTRAST: CPT

## 2023-08-23 PROCEDURE — 71046 X-RAY EXAM CHEST 2 VIEWS: CPT

## 2023-08-23 PROCEDURE — 94761 N-INVAS EAR/PLS OXIMETRY MLT: CPT

## 2023-08-23 PROCEDURE — 87086 URINE CULTURE/COLONY COUNT: CPT

## 2023-08-23 PROCEDURE — 99285 EMERGENCY DEPT VISIT HI MDM: CPT

## 2023-08-23 PROCEDURE — 81001 URINALYSIS AUTO W/SCOPE: CPT

## 2023-08-23 PROCEDURE — 80053 COMPREHEN METABOLIC PANEL: CPT

## 2023-08-23 PROCEDURE — 84443 ASSAY THYROID STIM HORMONE: CPT

## 2023-08-23 RX ORDER — SODIUM CHLORIDE 0.9 % (FLUSH) 0.9 %
5-40 SYRINGE (ML) INJECTION EVERY 12 HOURS SCHEDULED
Status: DISCONTINUED | OUTPATIENT
Start: 2023-08-24 | End: 2023-08-29 | Stop reason: HOSPADM

## 2023-08-23 RX ORDER — DOCUSATE SODIUM 100 MG/1
100 CAPSULE, LIQUID FILLED ORAL 2 TIMES DAILY
Status: DISCONTINUED | OUTPATIENT
Start: 2023-08-24 | End: 2023-08-29 | Stop reason: HOSPADM

## 2023-08-23 RX ORDER — FUROSEMIDE 10 MG/ML
40 INJECTION INTRAMUSCULAR; INTRAVENOUS 2 TIMES DAILY
Status: DISCONTINUED | OUTPATIENT
Start: 2023-08-24 | End: 2023-08-24

## 2023-08-23 RX ORDER — METOPROLOL SUCCINATE 25 MG/1
25 TABLET, EXTENDED RELEASE ORAL DAILY
Status: DISCONTINUED | OUTPATIENT
Start: 2023-08-24 | End: 2023-08-29 | Stop reason: HOSPADM

## 2023-08-23 RX ORDER — MAGNESIUM SULFATE IN WATER 40 MG/ML
2000 INJECTION, SOLUTION INTRAVENOUS PRN
Status: DISCONTINUED | OUTPATIENT
Start: 2023-08-23 | End: 2023-08-29 | Stop reason: HOSPADM

## 2023-08-23 RX ORDER — POLYETHYLENE GLYCOL 3350 17 G/17G
17 POWDER, FOR SOLUTION ORAL DAILY PRN
Status: DISCONTINUED | OUTPATIENT
Start: 2023-08-23 | End: 2023-08-29 | Stop reason: HOSPADM

## 2023-08-23 RX ORDER — ACETAMINOPHEN 325 MG/1
650 TABLET ORAL EVERY 6 HOURS PRN
Status: DISCONTINUED | OUTPATIENT
Start: 2023-08-23 | End: 2023-08-29 | Stop reason: HOSPADM

## 2023-08-23 RX ORDER — ONDANSETRON 2 MG/ML
4 INJECTION INTRAMUSCULAR; INTRAVENOUS EVERY 6 HOURS PRN
Status: DISCONTINUED | OUTPATIENT
Start: 2023-08-23 | End: 2023-08-29 | Stop reason: HOSPADM

## 2023-08-23 RX ORDER — ONDANSETRON 4 MG/1
4 TABLET, ORALLY DISINTEGRATING ORAL EVERY 8 HOURS PRN
Status: DISCONTINUED | OUTPATIENT
Start: 2023-08-23 | End: 2023-08-29 | Stop reason: HOSPADM

## 2023-08-23 RX ORDER — ACETAMINOPHEN 650 MG/1
650 SUPPOSITORY RECTAL EVERY 6 HOURS PRN
Status: DISCONTINUED | OUTPATIENT
Start: 2023-08-23 | End: 2023-08-29 | Stop reason: HOSPADM

## 2023-08-23 RX ORDER — SODIUM CHLORIDE 9 MG/ML
INJECTION, SOLUTION INTRAVENOUS PRN
Status: DISCONTINUED | OUTPATIENT
Start: 2023-08-23 | End: 2023-08-29 | Stop reason: HOSPADM

## 2023-08-23 RX ORDER — ATORVASTATIN CALCIUM 10 MG/1
10 TABLET, FILM COATED ORAL DAILY
Status: DISCONTINUED | OUTPATIENT
Start: 2023-08-24 | End: 2023-08-29 | Stop reason: HOSPADM

## 2023-08-23 RX ORDER — METHENAMINE MANDELATE 500 MG/1
1 TABLET ORAL 2 TIMES DAILY
Status: DISCONTINUED | OUTPATIENT
Start: 2023-08-24 | End: 2023-08-29 | Stop reason: HOSPADM

## 2023-08-23 RX ORDER — MECLIZINE HCL 12.5 MG/1
25 TABLET ORAL 3 TIMES DAILY PRN
Status: DISCONTINUED | OUTPATIENT
Start: 2023-08-23 | End: 2023-08-29 | Stop reason: HOSPADM

## 2023-08-23 RX ORDER — SODIUM CHLORIDE 0.9 % (FLUSH) 0.9 %
5-40 SYRINGE (ML) INJECTION PRN
Status: DISCONTINUED | OUTPATIENT
Start: 2023-08-23 | End: 2023-08-29 | Stop reason: HOSPADM

## 2023-08-23 RX ORDER — LEVOTHYROXINE SODIUM 0.1 MG/1
100 TABLET ORAL
Status: DISCONTINUED | OUTPATIENT
Start: 2023-08-24 | End: 2023-08-29 | Stop reason: HOSPADM

## 2023-08-23 RX ORDER — POTASSIUM CHLORIDE 20 MEQ/1
40 TABLET, EXTENDED RELEASE ORAL PRN
Status: DISCONTINUED | OUTPATIENT
Start: 2023-08-23 | End: 2023-08-29 | Stop reason: HOSPADM

## 2023-08-23 RX ORDER — POTASSIUM CHLORIDE 7.45 MG/ML
10 INJECTION INTRAVENOUS PRN
Status: DISCONTINUED | OUTPATIENT
Start: 2023-08-23 | End: 2023-08-29 | Stop reason: HOSPADM

## 2023-08-23 RX ADMIN — CEFTRIAXONE SODIUM 1000 MG: 1 INJECTION, POWDER, FOR SOLUTION INTRAMUSCULAR; INTRAVENOUS at 21:45

## 2023-08-23 ASSESSMENT — PAIN - FUNCTIONAL ASSESSMENT: PAIN_FUNCTIONAL_ASSESSMENT: 0-10

## 2023-08-23 ASSESSMENT — PAIN SCALES - GENERAL: PAINLEVEL_OUTOF10: 0

## 2023-08-23 NOTE — ED PROVIDER NOTES
[address] [place] [address]  Emergency Department Encounter    Patient Name: Dean Cohen  MRN: [id number]  YOB: 1930  Date of Evaluation: 8/23/2023  Provider: Jami Hirsch MD  Note Started: 8:31 PM EDT 8/23/23    CHIEF COMPLAINT  Shortness of Breath (Pt reports SOB, BLE swelling, cough worsening yesterday. Pt daughter sts they went to her doctor on 08/04 and taken off spironolactone and then lasix was increased from 20mg three times per week and 40mg twice a day. )    SHARED SERVICE VISIT  I have seen and evaluated this patient independently, attending physician was available for consultation. HISTORY OF PRESENT ILLNESS  Kamala Rogers is a 80 y.o. female who presents to the ED several week history of bilateral lower extremity edema and dyspnea. Patient accompanied by daughter today for evaluation. Patient states that she was on spironolactone although had some lower extremity swelling. Was discontinued and placed on Lasix 20 mg without improvement of symptoms. That was increased to 40 mg and patient reports ongoing swelling, 6 pound weight gain as well as dyspnea. She has no chest pain. No pain with deep inspiration. Denies fevers or chills. No headaches or dizziness. She denies neck, arm, jaw or back pain. Decreased urination without dysuria. No diarrhea or constipation. No fevers or chills. .    No other complaints, modifying factors or associated symptoms. Nursing notes reviewed were all reviewed and agreed with or any disagreements were addressed in the HPI.     PMH:  Past Medical History:   Diagnosis Date    A-fib [facility])     Arthritis     CAD (coronary artery disease)     stent placed 2007    CHF (congestive heart failure) ([address] )     Depression 1980    treated with electoshock successfully    Diphtheria     age 15    Gout     Hyperlipemia     Hypertension     STEMI (ST elevation myocardial infarction) ([address] ) 2007    Thyroid disease     Wears hearing aid     left ear disease, and Wears hearing aid. Social Determinants:   Patient does not drive although does have transportation to and from doctors appointments. Consults:   Case discussed with hospitalist, CANDIE Stephens, regarding admission for CHF exacerbation and UTI. Orders are in place. Reassessment:      Patient becomes hypoxic conversationally and with mild movement. We will treat as low as 79% on room air. Responds well to nasal cannula 4 L. Remainder of vitals are stable. MDM/Disposition Considerations:   Briefly Tamir Amaya presents for weight gain, dyspnea and leg swelling. Patient reports recent change from spironolactone to Lasix with ongoing symptoms. Reports Lasix and increase in still having ongoing dyspnea and leg swelling. Hypoxic on room air. BNP over 5000. Troponin 16 with repeat of 17. CBC with a leukocytosis or anemia. CMP unremarkable. Chest x-ray with bilateral pleural effusions. EKG consistent with A-fib with nonspecific changes noted. Urinalysis with positive nitrites greater than 100 white blood cells and 3+ bacteria. Cultures pending. Given patient's decompensated acute CHF exacerbation where this time recommending admission as Case has been discussed with hospitalist and orders placed. .    Critical Care Time:   30 Minutes of critical care time spent not including separately billable procedures. FINAL IMPRESSION  1. Urinary tract infection with hematuria, site unspecified    2. Acute on chronic congestive heart failure, unspecified heart failure type ([address])    3. Acute respiratory failure with hypoxia [facility])      Based on emergency department evaluation do not feel that additional emergent treatment/work-up indicated at this time. Based on limitations of ongoing treatment and ruling out additional medical conditions from the emergency department, I do feel that admission indicated.   Recommendations for admission have been discussed with Tamir Amaya and/or surrogate

## 2023-08-24 ENCOUNTER — APPOINTMENT (OUTPATIENT)
Dept: GENERAL RADIOLOGY | Age: 88
DRG: 291 | End: 2023-08-24
Payer: MEDICARE

## 2023-08-24 LAB
ANION GAP SERPL CALCULATED.3IONS-SCNC: 10 MMOL/L (ref 3–16)
BASOPHILS # BLD: 0 K/UL (ref 0–0.2)
BASOPHILS NFR BLD: 0.4 %
BUN SERPL-MCNC: 19 MG/DL (ref 7–20)
CALCIUM SERPL-MCNC: 9.5 MG/DL (ref 8.3–10.6)
CHLORIDE SERPL-SCNC: 106 MMOL/L (ref 99–110)
CHOLEST SERPL-MCNC: 107 MG/DL (ref 0–199)
CO2 SERPL-SCNC: 24 MMOL/L (ref 21–32)
CREAT SERPL-MCNC: 1.1 MG/DL (ref 0.6–1.2)
DEPRECATED RDW RBC AUTO: 14.9 % (ref 12.4–15.4)
EKG ATRIAL RATE: 277 BPM
EKG DIAGNOSIS: NORMAL
EKG Q-T INTERVAL: 366 MS
EKG QRS DURATION: 82 MS
EKG QTC CALCULATION (BAZETT): 432 MS
EKG R AXIS: -27 DEGREES
EKG T AXIS: 18 DEGREES
EKG VENTRICULAR RATE: 84 BPM
EOSINOPHIL # BLD: 0.1 K/UL (ref 0–0.6)
EOSINOPHIL NFR BLD: 1 %
GFR SERPLBLD CREATININE-BSD FMLA CKD-EPI: 47 ML/MIN/{1.73_M2}
GLUCOSE SERPL-MCNC: 101 MG/DL (ref 70–99)
HCT VFR BLD AUTO: 33.7 % (ref 36–48)
HDLC SERPL-MCNC: 43 MG/DL (ref 40–60)
HGB BLD-MCNC: 11.3 G/DL (ref 12–16)
LDLC SERPL CALC-MCNC: 53 MG/DL
LV EF: 33 %
LVEF MODALITY: NORMAL
LYMPHOCYTES # BLD: 1.6 K/UL (ref 1–5.1)
LYMPHOCYTES NFR BLD: 28.4 %
MAGNESIUM SERPL-MCNC: 2.1 MG/DL (ref 1.8–2.4)
MCH RBC QN AUTO: 31.5 PG (ref 26–34)
MCHC RBC AUTO-ENTMCNC: 33.5 G/DL (ref 31–36)
MCV RBC AUTO: 94 FL (ref 80–100)
MONOCYTES # BLD: 0.6 K/UL (ref 0–1.3)
MONOCYTES NFR BLD: 10.3 %
NEUTROPHILS # BLD: 3.3 K/UL (ref 1.7–7.7)
NEUTROPHILS NFR BLD: 59.9 %
NT-PROBNP SERPL-MCNC: 5609 PG/ML (ref 0–449)
PLATELET # BLD AUTO: 116 K/UL (ref 135–450)
PMV BLD AUTO: 9.5 FL (ref 5–10.5)
POTASSIUM SERPL-SCNC: 4.1 MMOL/L (ref 3.5–5.1)
RBC # BLD AUTO: 3.58 M/UL (ref 4–5.2)
SODIUM SERPL-SCNC: 140 MMOL/L (ref 136–145)
TRIGL SERPL-MCNC: 56 MG/DL (ref 0–150)
TROPONIN, HIGH SENSITIVITY: 15 NG/L (ref 0–14)
TROPONIN, HIGH SENSITIVITY: 17 NG/L (ref 0–14)
TROPONIN, HIGH SENSITIVITY: 17 NG/L (ref 0–14)
TSH SERPL DL<=0.005 MIU/L-ACNC: 0.83 UIU/ML (ref 0.27–4.2)
VLDLC SERPL CALC-MCNC: 11 MG/DL
WBC # BLD AUTO: 5.5 K/UL (ref 4–11)

## 2023-08-24 PROCEDURE — 6360000002 HC RX W HCPCS: Performed by: INTERNAL MEDICINE

## 2023-08-24 PROCEDURE — 84484 ASSAY OF TROPONIN QUANT: CPT

## 2023-08-24 PROCEDURE — 85025 COMPLETE CBC W/AUTO DIFF WBC: CPT

## 2023-08-24 PROCEDURE — C8929 TTE W OR WO FOL WCON,DOPPLER: HCPCS

## 2023-08-24 PROCEDURE — 93010 ELECTROCARDIOGRAM REPORT: CPT | Performed by: INTERNAL MEDICINE

## 2023-08-24 PROCEDURE — 51701 INSERT BLADDER CATHETER: CPT

## 2023-08-24 PROCEDURE — 80048 BASIC METABOLIC PNL TOTAL CA: CPT

## 2023-08-24 PROCEDURE — 51798 US URINE CAPACITY MEASURE: CPT

## 2023-08-24 PROCEDURE — 83880 ASSAY OF NATRIURETIC PEPTIDE: CPT

## 2023-08-24 PROCEDURE — 6360000004 HC RX CONTRAST MEDICATION: Performed by: NURSE PRACTITIONER

## 2023-08-24 PROCEDURE — 6370000000 HC RX 637 (ALT 250 FOR IP): Performed by: NURSE PRACTITIONER

## 2023-08-24 PROCEDURE — 94761 N-INVAS EAR/PLS OXIMETRY MLT: CPT

## 2023-08-24 PROCEDURE — 2580000003 HC RX 258: Performed by: NURSE PRACTITIONER

## 2023-08-24 PROCEDURE — 71045 X-RAY EXAM CHEST 1 VIEW: CPT

## 2023-08-24 PROCEDURE — 6360000002 HC RX W HCPCS: Performed by: NURSE PRACTITIONER

## 2023-08-24 PROCEDURE — 99222 1ST HOSP IP/OBS MODERATE 55: CPT | Performed by: INTERNAL MEDICINE

## 2023-08-24 PROCEDURE — 80061 LIPID PANEL: CPT

## 2023-08-24 PROCEDURE — 6370000000 HC RX 637 (ALT 250 FOR IP): Performed by: INTERNAL MEDICINE

## 2023-08-24 PROCEDURE — 83735 ASSAY OF MAGNESIUM: CPT

## 2023-08-24 PROCEDURE — 36415 COLL VENOUS BLD VENIPUNCTURE: CPT

## 2023-08-24 PROCEDURE — 2700000000 HC OXYGEN THERAPY PER DAY

## 2023-08-24 PROCEDURE — 1200000000 HC SEMI PRIVATE

## 2023-08-24 RX ORDER — ISOSORBIDE DINITRATE 10 MG/1
5 TABLET ORAL 2 TIMES DAILY
Status: DISCONTINUED | OUTPATIENT
Start: 2023-08-24 | End: 2023-08-29 | Stop reason: HOSPADM

## 2023-08-24 RX ORDER — TAMSULOSIN HYDROCHLORIDE 0.4 MG/1
0.4 CAPSULE ORAL DAILY
Status: DISCONTINUED | OUTPATIENT
Start: 2023-08-24 | End: 2023-08-29 | Stop reason: HOSPADM

## 2023-08-24 RX ORDER — ASPIRIN 81 MG/1
81 TABLET ORAL DAILY
Status: DISCONTINUED | OUTPATIENT
Start: 2023-08-24 | End: 2023-08-29 | Stop reason: HOSPADM

## 2023-08-24 RX ORDER — HYDRALAZINE HYDROCHLORIDE 10 MG/1
10 TABLET, FILM COATED ORAL EVERY 12 HOURS SCHEDULED
Status: DISCONTINUED | OUTPATIENT
Start: 2023-08-24 | End: 2023-08-29 | Stop reason: HOSPADM

## 2023-08-24 RX ORDER — FUROSEMIDE 10 MG/ML
20 INJECTION INTRAMUSCULAR; INTRAVENOUS 2 TIMES DAILY
Status: DISCONTINUED | OUTPATIENT
Start: 2023-08-24 | End: 2023-08-25

## 2023-08-24 RX ADMIN — PERFLUTREN 1.5 ML: 6.52 INJECTION, SUSPENSION INTRAVENOUS at 07:01

## 2023-08-24 RX ADMIN — ISOSORBIDE DINITRATE 5 MG: 10 TABLET ORAL at 20:16

## 2023-08-24 RX ADMIN — Medication 10 ML: at 20:16

## 2023-08-24 RX ADMIN — DOCUSATE SODIUM 100 MG: 100 CAPSULE, LIQUID FILLED ORAL at 08:56

## 2023-08-24 RX ADMIN — APIXABAN 2.5 MG: 2.5 TABLET, FILM COATED ORAL at 08:56

## 2023-08-24 RX ADMIN — ATORVASTATIN CALCIUM 10 MG: 10 TABLET, FILM COATED ORAL at 08:56

## 2023-08-24 RX ADMIN — CEFTRIAXONE SODIUM 1000 MG: 1 INJECTION, POWDER, FOR SOLUTION INTRAMUSCULAR; INTRAVENOUS at 20:23

## 2023-08-24 RX ADMIN — METOPROLOL SUCCINATE 25 MG: 25 TABLET, EXTENDED RELEASE ORAL at 08:56

## 2023-08-24 RX ADMIN — LEVOTHYROXINE SODIUM 100 MCG: 0.1 TABLET ORAL at 05:20

## 2023-08-24 RX ADMIN — TAMSULOSIN HYDROCHLORIDE 0.4 MG: 0.4 CAPSULE ORAL at 12:27

## 2023-08-24 RX ADMIN — APIXABAN 2.5 MG: 2.5 TABLET, FILM COATED ORAL at 20:17

## 2023-08-24 RX ADMIN — ISOSORBIDE DINITRATE 5 MG: 10 TABLET ORAL at 13:37

## 2023-08-24 RX ADMIN — FUROSEMIDE 40 MG: 10 INJECTION, SOLUTION INTRAMUSCULAR; INTRAVENOUS at 07:35

## 2023-08-24 RX ADMIN — ASPIRIN 81 MG: 81 TABLET, COATED ORAL at 12:27

## 2023-08-24 RX ADMIN — FUROSEMIDE 20 MG: 10 INJECTION, SOLUTION INTRAMUSCULAR; INTRAVENOUS at 18:05

## 2023-08-24 RX ADMIN — Medication 10 ML: at 08:56

## 2023-08-24 RX ADMIN — DOCUSATE SODIUM 100 MG: 100 CAPSULE, LIQUID FILLED ORAL at 20:17

## 2023-08-24 NOTE — ED NOTES
Pt transported to A2 with all belongings in stable condition at this time     Ngoc Flores RN  08/23/23 6230

## 2023-08-24 NOTE — PROGRESS NOTES
Patient's EF (Ejection Fraction) is greater than 40%    Heart Failure Medications:  Diuretics[de-identified] Furosemide    (One of the following REQUIRED for EF </= 40%/SYSTOLIC FAILURE but MAY be used in EF% >40%/DIASTOLIC FAILURE)        ACE[de-identified] None        ARB[de-identified] None         ARNI[de-identified] None    (Beta Blockers)  NON- Evidenced Based Beta Blocker (for EF% >40%/DIASTOLIC FAILURE): None    Evidenced Based Beta Blocker::(REQUIRED for EF% <40%/SYSTOLIC FAILURE) Metoprolol SUCCinate- Toprol XL  . .................................................................................................................................................. Healthy Weight Tracking - BMI + Meds 12/16/2021 12/20/2021 11/27/2022 11/28/2022 11/28/2022 8/23/2023 8/23/2023   Weight 180 lb 178 lb 168 lb 176 lb 9.4 oz - 171 lb 173 lb 14.4 oz   Height 5' 1\" 5' 1\" 5' 1\" 5' 1\" 5' 1\" - 5' 4\"   Body Mass Index 34.01 kg/m2 33.63 kg/m2 31.74 kg/m2 33.36 kg/m2 - - 29.85 kg/m2   Some recent data might be hidden         Patient's weights and intake/output reviewed: Yes    Daily Weight log at bedside, patient/family participation in use of log: \"yes    Patient's Last Weight: 173.9  lbs obtained by standing scale. Difference of 2 lbs more than last documented weight.       Intake/Output Summary (Last 24 hours) at 8/24/2023 0315  Last data filed at 8/23/2023 2225  Gross per 24 hour   Intake 50 ml   Output --   Net 50 ml       Education Booklet Provided: yes    Comorbidities Reviewed Yes    Patient has a past medical history of A-fib ([address]), Arthritis, CAD (coronary artery disease), CHF (congestive heart failure) ([address]), Depression, Diphtheria, Gout, Hyperlipemia, Hypertension, STEMI (ST elevation myocardial infarction) ([address]), Thyroid disease, and Wears hearing aid.     >>For CHF and Comorbidity documentation on Education Time and Topics, please see Education Tab    Progressive Mobility Assessment:  What is this patient's Current Level of Mobility?: Ambulatory- with

## 2023-08-24 NOTE — CONSULTS
Nutrition Education    Consult received for CHF diet education. Provided pt with written and verbal instruction on HF nutrition therapy. Discussed low sodium diet, daily weights, and fluid restriction. Pt voiced understanding. Pt reports that she tries to eat healthy and avoids processed foods. Pt reports that she was previously drinking over 64 oz/day. Time spent: 15 minutes    Educated on CHF diet  Learners: Patient  Readiness: Acceptance  Method: Explanation and Handout  Response: Verbalizes Understanding  Contact name and number provided.     Melissa Webb RD, LD  Contact Number: [phone]

## 2023-08-24 NOTE — PLAN OF CARE
Patient's EF (Ejection Fraction) is less than 40%    Heart Failure Medications:  Diuretics[de-identified] Furosemide    (One of the following REQUIRED for EF </= 40%/SYSTOLIC FAILURE but MAY be used in EF% >40%/DIASTOLIC FAILURE)        ACE[de-identified] None        ARB[de-identified] None         ARNI[de-identified] None    (Beta Blockers)  NON- Evidenced Based Beta Blocker (for EF% >40%/DIASTOLIC FAILURE): None    Evidenced Based Beta Blocker::(REQUIRED for EF% <40%/SYSTOLIC FAILURE) Metoprolol SUCCinate- Toprol XL  . .................................................................................................................................................. Healthy Weight Tracking - BMI + Meds 11/27/2022 11/28/2022 11/28/2022 8/23/2023 8/23/2023 8/24/2023 8/24/2023   Weight 168 lb 176 lb 9.4 oz - 171 lb 173 lb 14.4 oz (No Data) 169 lb 9.6 oz   Height 5' 1\" 5' 1\" 5' 1\" - 5' 4\" - -   Body Mass Index 31.74 kg/m2 33.36 kg/m2 - - 29.85 kg/m2 - 29.11 kg/m2   Some recent data might be hidden         Patient's weights and intake/output reviewed: Yes    Daily Weight log at bedside, patient/family participation in use of log: \"yes    Patient's Last Weight: 169 lbs obtained by standing scale. Difference of 4 lbs less than last documented weight.       Intake/Output Summary (Last 24 hours) at 8/24/2023 1527  Last data filed at 8/24/2023 1455  Gross per 24 hour   Intake 340 ml   Output 3075 ml   Net -2735 ml       Education Booklet Provided: yes    Comorbidities Reviewed Yes    Patient has a past medical history of A-fib ([address]), Arthritis, CAD (coronary artery disease), CHF (congestive heart failure) ([address]), Depression, Diphtheria, Gout, Hyperlipemia, Hypertension, STEMI (ST elevation myocardial infarction) ([address]), Thyroid disease, and Wears hearing aid.     >>For CHF and Comorbidity documentation on Education Time and Topics, please see Education Tab    Progressive Mobility Assessment:  What is this patient's Current Level of Mobility?: Ambulatory- with Assistance  How DISPLAY PLAN FREE TEXT

## 2023-08-24 NOTE — CONSULTS
[address] [place] [address]  [phone]        Reason for Consultation/Chief Complaint: \"I have been having shortness of breath and swelling of both legs. \"    History of Present Illness:  Telma Burdick is a 80 y.o. patient who presented to the hospital with complaints of  progressive swelling of both legs and shortness of breath for several months. On June 13 daughter says she was prescribed furosemide 20mg three times a week and it was not effective. Patient says she has not been urinating as she should. Symptoms kept worsening and her cardiologist very carefully and slowly increased dose to 40mg three times a week but she did not improve so she came to [facility] for further care. She has CKD 3 and has been under care of nephrologist.  She has persistent afib and has been rate controlled with metoprolol succinate 25mg daily and anticoagulated with adjusted dose apixaban 25mg BID  She has history of remote MI LAD stent that was preceded by jaw pain but she does not have those symptoms now. She has dry cough. She has trouble sleeping due to dyspnea. I have been asked to provide consultation regarding further management and testing. She is followed by Dr Yelitza Guadalupe at the [address] [place] [address] hospital group for   SVT (supraventricular tachycardia) ( [address]) - May 21, 2018 (Primary Dx); Presence of drug coated stent in LAD coronary artery;   Paroxysmal atrial fibrillation (CMS HCC); Mixed hyperlipidemia;   MI, old; Ischemic cardiomyopathy;   Essential hypertension;   Chronic diastolic congestive heart failure (CMS HCC);    Coronary artery disease involving native coronary artery of native heart without angina pectoris       Past Medical History:   has a past medical history of A-fib ([address]), Arthritis, CAD (coronary artery disease), CHF (congestive heart failure) ([address]), Depression, Diphtheria, Gout, Hyperlipemia, Hypertension, STEMI (ST elevation myocardial infarction) ([address]), Thyroid disease, and Wears Irreg irreg  rate and rhythm, S2 normal, no murmur, rub or gallop   Abdomen:   Soft, non-tender, bowel sounds active all four quadrants,  no masses, no organomegaly    Urinary bladder is distended        Extremities: Extremities normal, atraumatic, no cyanosis, 2 + bilat leg edema   Pulses: 2+ DP rt left foot pulses neg    Skin: Skin color, texture, turgor normal, no rashes or lesions   Pysch: Normal mood and affect   Neurologic: Normal gross motor and sensory exam.         Labs  CBC:   Lab Results   Component Value Date/Time    WBC 5.5 08/24/2023 04:46 AM    RBC 3.58 08/24/2023 04:46 AM    HGB 11.3 08/24/2023 04:46 AM    HCT 33.7 08/24/2023 04:46 AM    MCV 94.0 08/24/2023 04:46 AM    RDW 14.9 08/24/2023 04:46 AM     08/24/2023 04:46 AM     CMP:    Lab Results   Component Value Date/Time     08/24/2023 04:46 AM    K 4.1 08/24/2023 04:46 AM    K 4.0 08/23/2023 06:24 PM     08/24/2023 04:46 AM    CO2 24 08/24/2023 04:46 AM    BUN 19 08/24/2023 04:46 AM    CREATININE 1.1 08/24/2023 04:46 AM    GFRAA 56 03/13/2021 08:12 AM    GFRAA >60 09/30/2012 02:33 PM    AGRATIO 1.0 08/23/2023 06:24 PM    LABGLOM 47 08/24/2023 04:46 AM    GLUCOSE 101 08/24/2023 04:46 AM    PROT 7.0 08/23/2023 06:24 PM    PROT 7.7 09/30/2012 02:33 PM    CALCIUM 9.5 08/24/2023 04:46 AM    BILITOT 0.5 08/23/2023 06:24 PM    ALKPHOS 69 08/23/2023 06:24 PM    AST 20 08/23/2023 06:24 PM    ALT 12 08/23/2023 06:24 PM     PT/INR:  No results found for: PTINR  Lab Results   Component Value Date    TROPONINI <0.01 11/27/2022   ProBNP [address]  16 17 17 15   EKG:  I have reviewed EKG with the following interpretation:       Atrial fibrillationAnteroseptal infarct (cited on or before 30-DEC-2014)Abnormal ECGWhen compared with ECG of 27-NOV-2022 18:29,No significant change was found P    Atrial fibrillation with rapid ventricular responseAnteroseptal infarct (cited on or before 30-DEC-2014)Possible Inferior infarct , age

## 2023-08-24 NOTE — PROGRESS NOTES
This RN at bedside for morning bedside shift report with nightshift RN. Pt has audible crackles and is visibly short of breath. Morning dos of IV lasix given at this time. Pt's oxygen requirements increased from 3L to 5L. Jennifer Thompson MD paged regarding pt's current condition.

## 2023-08-24 NOTE — PROGRESS NOTES
V2.0    [facility] [place] Note      Name:  Tamir Amaya /Age/Sex: 1930  (80 y.o. female)   MRN & CSN:  [id number] & [id number] Encounter Date/Time: 2023 12:05 PM EDT   Location:  [facility] PCP: MD Sharon Waller MD       Hospital Day: 2    Assessment and Recommendations      80 y.o. female, with PMH Of CHF, atrial fibrillation, HTN, CAD, HLD,  who presented to [facility] with shortness of breath, cough and BLE swelling    Acute on chronic combined systolic and diastolic congestive heart failure  Shortness of breath and bilateral lower extremity swelling secondary to above  Probable UTI  Elevated troponin/demand ischemia in the setting of decompensated CHF  Acute hypoxia secondary to decompensated CHF  Hypertension  Hyperlipidemia  CAD  PAF  CKD stage IIIb with baseline creatinine of around 1.2  Hypothyroidism  Normocytic anemia  Acute urinary retention likely due to UTI    Plan:   Lasix 40 mg every 12 hours, first dose now with close monitoring of renal function and electrolytes  Stat chest x-ray  Stat NT proBNP ordered reviewed shortness elevation from yesterday  Patient responded to the Lasix  Patient retained 700 cc of urine on bladder scan will straight cath hate to put Muñoz's in a patient with UTI, will start her on Flomax 0.4 mg daily and continue with IV Rocephin day 2 and follow-up urine cultures  Titrate supplemental oxygen to keep SPO2 above 92%  Incentive spirometry every hour while awake  Resume other home medication for her pulmonary  Labs in a.m. Comment: Please note this report has been produced using speech recognition software and may contain errors related to that system including errors in grammar, punctuation, and spelling, as well as words and phrases that may be inappropriate. If there are any questions or concerns please feel free to contact the dictating provider for clarification. Diet ADULT DIET; Regular;  No Added Reason for Exam: Shortness of Breath FINDINGS: Small bilateral pleural effusions with perihilar and bibasilar airspace opacities. No pneumothorax. Stable cardiomediastinal silhouette     Small bilateral pleural effusions with perihilar and bibasilar airspace opacities. This is favored to represent CHF. However, pneumonia is not fully excluded       CBC:   Recent Labs     08/23/23 1824 08/24/23 0446   WBC 5.4 5.5   HGB 11.1* 11.3*   * 116*     BMP:    Recent Labs     08/23/23 1824 08/24/23  0446   * 140   K 4.0 4.1    106   CO2 23 24   BUN 17 19   CREATININE 1.1 1.1   GLUCOSE 107* 101*     Hepatic:   Recent Labs     08/23/23 1824   AST 20   ALT 12   BILITOT 0.5   ALKPHOS 69     Lipids:   Lab Results   Component Value Date/Time    CHOL 107 08/24/2023 04:46 AM    HDL 43 08/24/2023 04:46 AM    HDL 38 10/10/2011 05:31 AM    TRIG 56 08/24/2023 04:46 AM     Hemoglobin A1C:   Lab Results   Component Value Date/Time    LABA1C 5.9 01/07/2015 05:55 AM     TSH:   Lab Results   Component Value Date/Time    TSH 0.83 08/23/2023 11:53 PM     Troponin: No results found for: TROPONINT  Lactic Acid: No results for input(s): LACTA in the last 72 hours.   BNP:   Recent Labs     08/23/23 1824 08/24/23  0848   PROBNP 5,289* 5,609*     UA:  Lab Results   Component Value Date/Time    NITRU POSITIVE 08/23/2023 08:21 PM    COLORU Straw 08/23/2023 08:21 PM    PHUR 6.5 08/23/2023 08:21 PM    LABCAST 0-1 Hyaline 08/05/2014 11:28 AM    WBCUA >100 08/23/2023 08:21 PM    RBCUA 5-10 08/23/2023 08:21 PM    BACTERIA 3+ 08/23/2023 08:21 PM    CLARITYU SL CLOUDY 08/23/2023 08:21 PM    Marleni Spar <=1.005 08/23/2023 08:21 PM    LEUKOCYTESUR LARGE 08/23/2023 08:21 PM    UROBILINOGEN 0.2 08/23/2023 08:21 PM    BILIRUBINUR Negative 08/23/2023 08:21 PM    BILIRUBINUR neg 09/30/2012 03:02 PM    BLOODU TRACE-INTACT 08/23/2023 08:21 PM    GLUCOSEU Negative 08/23/2023 08:21 PM    KETUA Negative 08/23/2023 08:21 PM     Urine Cultures:

## 2023-08-24 NOTE — PROGRESS NOTES
Pt straight cathed per order for retention. 500 mLs of yellow urine drained. Pt tolerated well. Bladder scan shows 0 mLs after straight cath.

## 2023-08-24 NOTE — ED NOTES
Ambulated pt approx 100 feet on room air while monitoring pulse oximetry. Prior to ambulation, pt was resting comfortably with HR and SpO2 of 90% and 86 bpm. During ambulation, pt's SpO2 and HR were 92% and 70 bpm. Pt stated they (did/did not) feel SOB, CP, dizziness or lightheadedness. After ambulation, pt was returned to bed and SpO2 and HR were 92% and 89 bpm. DULCE Liang made aware.        [employer]  08/23/23 2017

## 2023-08-24 NOTE — PLAN OF CARE
Problem: Chronic Conditions and Co-morbidities  Goal: Patient's chronic conditions and co-morbidity symptoms are monitored and maintained or improved  Outcome: Progressing  Enforced importance of medication compliance to help manage afib diagnosis. Pt remains compliant.

## 2023-08-24 NOTE — H&P
Historical Provider, MD   levothyroxine (SYNTHROID) 100 MCG tablet Take 100 mcg by mouth daily. Historical Provider, MD     Labs: Personally reviewed and interpreted for clinical significance. Recent Labs     08/23/23 1824   WBC 5.4   HGB 11.1*   HCT 33.5*   *     Recent Labs     08/23/23 1824   *   K 4.0      CO2 23   BUN 17   CREATININE 1.1   CALCIUM 9.5     Recent Labs     08/23/23 1824 08/23/23 2021   PROBNP 5,289*  --    TROPHS 16* 17*     Recent Labs     08/23/23 1824   AST 20   ALT 12   BILITOT 0.5   ALKPHOS 69     Thank you Fernando Nevarez MD for the opportunity to be involved in this patient's care. If you have any questions or concerns please feel free to contact me at [phone].      ROBERT Ann - NEFTALY  --------------------Anticipated Dr. Veronica lomeli----------------------

## 2023-08-24 NOTE — ED NOTES
Pt was found to be 79% on room air.  Placed on 4L of oxygen and sats quickly came up to 92%     Phoebe Pratt RN  08/23/23 210 Bonny Garza RN  08/23/23 1616

## 2023-08-24 NOTE — CARE COORDINATION
Case Management Assessment  Initial Evaluation    Date/Time of Evaluation: 8/24/2023 4:26 PM  Assessment Completed by: Charlene Freitas RN    If patient is discharged prior to next notation, then this note serves as note for discharge by case management. Patient Name: Cortez Brandon                   YOB: 1930  Diagnosis: Acute on chronic combined systolic and diastolic CHF (congestive heart failure) ([address] [facility]) [I50.43]  Urinary tract infection with hematuria, site unspecified [N39.0, R31.9]  Acute on chronic congestive heart failure, unspecified heart failure type [facility]) [I50.9]                   Date / Time: 8/23/2023  7:41 PM    Patient Admission Status: Inpatient   Readmission Risk (Low < 19, Mod (19-27), High > 27): Readmission Risk Score: 11.5    Current PCP: Aleah Sanchez MD  PCP verified by CM? Yes    Chart Reviewed: Yes      History Provided by: Child/Family  Patient Orientation: Alert and Oriented, Person, Place, Situation, Self    Patient Cognition: Alert    Hospitalization in the last 30 days (Readmission):  No    If yes, Readmission Assessment in CM Navigator will be completed. Advance Directives:      Code Status: Full Code   Patient's Primary Decision Maker is:      Primary Decision Maker: Marquez Jackson - Child - [phone]    Discharge Planning:    Patient lives with: Alone Type of Home: Apartment  Primary Care Giver: Self  Patient Support Systems include: Children   Current Financial resources: Medicare  Current community resources: None  Current services prior to admission: Durable Medical Equipment            Current DME: Shower Chair, Walker, Other (Comment) (raised toilet seat)            Type of Home Care services:  OT, PT, Nursing Services    ADLS  Prior functional level: Independent in ADLs/IADLs  Current functional level: Independent in ADLs/IADLs    PT AM-PAC:   /24  OT AM-PAC:   /24    Family can provide assistance at DC:  Yes  Would you like Case Management to discuss the discharge plan with any other family members/significant others, and if so, who? Yes  Plans to Return to Present Housing: Yes  Potential Assistance needed at discharge: Home Care            Potential DME:    Patient expects to discharge to:  [address] for transportation at discharge:      Financial    Payor: Doris Dejesus / Plan: [employer] ESSENTIAL/PLUS / Product Type: *No Product type* /     Does insurance require precert for SNF: Yes    Potential assistance Purchasing Medications: No  Meds-to-Beds request: Yes      [facility] [address] [facility] [phone] - F [phone]  [address] [place] [address] [address] [facility] [address]  Phone: [phone] Fax: [phone]    [address]  [address] [facility] [address] [facility],[address]  Phone: [phone] Fax: [phone] [facility], [address] [facility] [address] [facility]  [address] [facility] [address]  Phone: [phone] Fax: [phone]      Notes:    Factors facilitating achievement of predicted outcomes: Family support, Motivated, Cooperative, and Pleasant      Additional Case Management Notes: Spoke with patient's daughter at bedside (patient sleeping). Patient lives in an apartment alone. Daughter states she has been trying to get patient to come live with her but patient likes her independence. Patient is independent at home with ADL's and family assists with house cleaning and transportation. Daughter states patient discharged last hospitalization with [facility] and she would like for her to discharge with them again. Placed call to [facility] with [facility] and notified of referral.  She states they are able to follow at discharge for home care needs.          IF APPLICABLE: The Patient and/or patient representative Kamala and her family were provided with

## 2023-08-24 NOTE — ED PROVIDER NOTES
I was not asked to see this patient. I was available for consultation if deemed necessary. I was only asked to interpret the EKG. Please see DULCE Cox's note for care of the patient while in the emergency department and for final disposition. The Ekg interpreted by me shows  atrial fibrillation with a rate of 84  Axis is   Normal  QTc is  normal  Intervals and Durations are unremarkable.       ST Segments: no acute change and nonspecific changes  No significant change from prior EKG dated - 11/27/22  No STEMI           Salina Henriquez MD  08/23/23 203

## 2023-08-24 NOTE — CARE COORDINATION
[facility]    Referral received from  to follow for home care services. I will follow for needs, and speak with patient to verify demos.     Manuel Rose RN, BSN CTN  [facility] [phone]

## 2023-08-25 PROBLEM — R31.9 URINARY TRACT INFECTION WITH HEMATURIA: Status: ACTIVE | Noted: 2023-08-25

## 2023-08-25 PROBLEM — N39.0 URINARY TRACT INFECTION WITH HEMATURIA: Status: ACTIVE | Noted: 2023-08-25

## 2023-08-25 PROBLEM — N17.1 ACUTE RENAL FAILURE WITH ACUTE RENAL CORTICAL NECROSIS SUPERIMPOSED ON STAGE 3B CHRONIC KIDNEY DISEASE (HCC): Status: ACTIVE | Noted: 2023-08-25

## 2023-08-25 PROBLEM — N18.32 ACUTE RENAL FAILURE WITH ACUTE RENAL CORTICAL NECROSIS SUPERIMPOSED ON STAGE 3B CHRONIC KIDNEY DISEASE (HCC): Status: ACTIVE | Noted: 2023-08-25

## 2023-08-25 LAB
ANION GAP SERPL CALCULATED.3IONS-SCNC: 10 MMOL/L (ref 3–16)
BACTERIA UR CULT: NORMAL
BUN SERPL-MCNC: 27 MG/DL (ref 7–20)
CALCIUM SERPL-MCNC: 9.6 MG/DL (ref 8.3–10.6)
CHLORIDE SERPL-SCNC: 102 MMOL/L (ref 99–110)
CO2 SERPL-SCNC: 27 MMOL/L (ref 21–32)
CREAT SERPL-MCNC: 1.4 MG/DL (ref 0.6–1.2)
GFR SERPLBLD CREATININE-BSD FMLA CKD-EPI: 35 ML/MIN/{1.73_M2}
GLUCOSE SERPL-MCNC: 102 MG/DL (ref 70–99)
MAGNESIUM SERPL-MCNC: 2.2 MG/DL (ref 1.8–2.4)
NT-PROBNP SERPL-MCNC: 4397 PG/ML (ref 0–449)
POTASSIUM SERPL-SCNC: 3.9 MMOL/L (ref 3.5–5.1)
SODIUM SERPL-SCNC: 139 MMOL/L (ref 136–145)

## 2023-08-25 PROCEDURE — 80048 BASIC METABOLIC PNL TOTAL CA: CPT

## 2023-08-25 PROCEDURE — 1200000000 HC SEMI PRIVATE

## 2023-08-25 PROCEDURE — 99233 SBSQ HOSP IP/OBS HIGH 50: CPT | Performed by: NURSE PRACTITIONER

## 2023-08-25 PROCEDURE — 6370000000 HC RX 637 (ALT 250 FOR IP): Performed by: INTERNAL MEDICINE

## 2023-08-25 PROCEDURE — 6370000000 HC RX 637 (ALT 250 FOR IP): Performed by: NURSE PRACTITIONER

## 2023-08-25 PROCEDURE — 94761 N-INVAS EAR/PLS OXIMETRY MLT: CPT

## 2023-08-25 PROCEDURE — 6360000002 HC RX W HCPCS: Performed by: NURSE PRACTITIONER

## 2023-08-25 PROCEDURE — 83735 ASSAY OF MAGNESIUM: CPT

## 2023-08-25 PROCEDURE — 2700000000 HC OXYGEN THERAPY PER DAY

## 2023-08-25 PROCEDURE — 83880 ASSAY OF NATRIURETIC PEPTIDE: CPT

## 2023-08-25 PROCEDURE — 2580000003 HC RX 258: Performed by: NURSE PRACTITIONER

## 2023-08-25 RX ORDER — TORSEMIDE 20 MG/1
20 TABLET ORAL DAILY
Status: DISCONTINUED | OUTPATIENT
Start: 2023-08-26 | End: 2023-08-29 | Stop reason: HOSPADM

## 2023-08-25 RX ORDER — FUROSEMIDE 20 MG/1
20 TABLET ORAL DAILY
Status: DISCONTINUED | OUTPATIENT
Start: 2023-08-26 | End: 2023-08-25

## 2023-08-25 RX ADMIN — LEVOTHYROXINE SODIUM 100 MCG: 0.1 TABLET ORAL at 05:13

## 2023-08-25 RX ADMIN — DOCUSATE SODIUM 100 MG: 100 CAPSULE, LIQUID FILLED ORAL at 09:17

## 2023-08-25 RX ADMIN — METOPROLOL SUCCINATE 25 MG: 25 TABLET, EXTENDED RELEASE ORAL at 12:18

## 2023-08-25 RX ADMIN — ASPIRIN 81 MG: 81 TABLET, COATED ORAL at 09:17

## 2023-08-25 RX ADMIN — Medication 10 ML: at 20:18

## 2023-08-25 RX ADMIN — ATORVASTATIN CALCIUM 10 MG: 10 TABLET, FILM COATED ORAL at 09:17

## 2023-08-25 RX ADMIN — Medication 10 ML: at 09:17

## 2023-08-25 RX ADMIN — CEFTRIAXONE SODIUM 1000 MG: 1 INJECTION, POWDER, FOR SOLUTION INTRAMUSCULAR; INTRAVENOUS at 20:23

## 2023-08-25 RX ADMIN — APIXABAN 2.5 MG: 2.5 TABLET, FILM COATED ORAL at 09:17

## 2023-08-25 RX ADMIN — APIXABAN 2.5 MG: 2.5 TABLET, FILM COATED ORAL at 20:18

## 2023-08-25 RX ADMIN — ISOSORBIDE DINITRATE 5 MG: 10 TABLET ORAL at 22:15

## 2023-08-25 RX ADMIN — DOCUSATE SODIUM 100 MG: 100 CAPSULE, LIQUID FILLED ORAL at 20:18

## 2023-08-25 RX ADMIN — HYDRALAZINE HYDROCHLORIDE 10 MG: 10 TABLET, FILM COATED ORAL at 21:01

## 2023-08-25 NOTE — PROGRESS NOTES
Vitals:    08/25/23 0912   BP: 89/60   Pulse:    Resp:    Temp:    SpO2:         Pt's BP low during assessment. Pt denies symptoms other than feeling tired and weak. All scheduled BP medications held given low BP and IV lasix held given bump in Cr. Will discuss with providers during rounds.

## 2023-08-25 NOTE — PLAN OF CARE
Problem: Chronic Conditions and Co-morbidities  Goal: Patient's chronic conditions and co-morbidity symptoms are monitored and maintained or improved  8/25/2023 0945 by Alejandrina Tapia RN  Outcome: Progressing   Pt remains complaint with medications to help control afib. Pt verbalizes understanding of importance.

## 2023-08-25 NOTE — PLAN OF CARE
Patient's EF (Ejection Fraction) is less than 40%    Heart Failure Medications:  Diuretics[de-identified] Furosemide- on hold due to ELIZABETH    (One of the following REQUIRED for EF </= 40%/SYSTOLIC FAILURE but MAY be used in EF% >40%/DIASTOLIC FAILURE)        ACE[de-identified] None        ARB[de-identified] None         ARNI[de-identified] None    (Beta Blockers)  NON- Evidenced Based Beta Blocker (for EF% >40%/DIASTOLIC FAILURE): None    Evidenced Based Beta Blocker::(REQUIRED for EF% <40%/SYSTOLIC FAILURE) Metoprolol SUCCinate- Toprol XL  . .................................................................................................................................................. Healthy Weight Tracking - BMI + Meds 11/28/2022 11/28/2022 8/23/2023 8/23/2023 8/24/2023 8/24/2023 8/25/2023   Weight 176 lb 9.4 oz - 171 lb 173 lb 14.4 oz (No Data) 169 lb 9.6 oz 166 lb 11.2 oz   Height 5' 1\" 5' 1\" - 5' 4\" - - -   Body Mass Index 33.36 kg/m2 - - 29.85 kg/m2 - 29.11 kg/m2 28.61 kg/m2   Some recent data might be hidden         Patient's weights and intake/output reviewed: Yes    Daily Weight log at bedside, patient/family participation in use of log: \"yes    Patient's Last Weight: 166 lbs obtained by standing scale. Difference of 3 lbs less than last documented weight.       Intake/Output Summary (Last 24 hours) at 8/25/2023 1714  Last data filed at 8/25/2023 1633  Gross per 24 hour   Intake 650 ml   Output 1124 ml   Net -474 ml         Education Booklet Provided: yes    Comorbidities Reviewed Yes    Patient has a past medical history of A-fib ([address]), Arthritis, CAD (coronary artery disease), CHF (congestive heart failure) ([address]), Depression, Diphtheria, Gout, Hyperlipemia, Hypertension, STEMI (ST elevation myocardial infarction) ([address]), Thyroid disease, and Wears hearing aid.     >>For CHF and Comorbidity documentation on Education Time and Topics, please see Education Tab    Progressive Mobility Assessment:  What is this patient's Current Level of Mobility?:

## 2023-08-25 NOTE — PLAN OF CARE
Problem: Discharge Planning  Goal: Discharge to home or other facility with appropriate resources  Outcome: Progressing     Problem: Safety - Adult  Goal: Free from fall injury  Outcome: Progressing     Problem: ABCDS Injury Assessment  Goal: Absence of physical injury  Outcome: Progressing     Problem: Chronic Conditions and Co-morbidities  Goal: Patient's chronic conditions and co-morbidity symptoms are monitored and maintained or improved  8/24/2023 2304 by Carolynn Felipe RN  Outcome: Progressing  8/24/2023 1533 by Nakia Helton RN  Outcome: Progressing

## 2023-08-25 NOTE — PROGRESS NOTES
Pt okay to receive Toprol XL per Anna Marie Maldonado, NEFTALY with cardiology. Okay to keep other cardiac medications on hold at this time.

## 2023-08-25 NOTE — PROGRESS NOTES
V2.0    [facility] [place]      Name:  Heriberto Corey /Age/Sex: 1930  (80 y.o. female)   MRN & CSN:  [id number] & [id number] Encounter Date/Time: 2023 12:05 PM EDT   [place]  [facility] [place] PCP: MD Romaine Hayes MD       Hospital Day: 3    Assessment and Recommendations      80 y.o. female, with PMH Of CHF, atrial fibrillation, HTN, CAD, HLD,  who presented to [facility] with shortness of breath, cough and BLE swelling    Acute on chronic combined systolic and diastolic congestive heart failure  Shortness of breath and bilateral lower extremity swelling secondary to above  Probable UTI  Elevated troponin/demand ischemia in the setting of decompensated CHF  Acute hypoxia secondary to decompensated CHF  Hypertension  Hyperlipidemia  CAD  PAF  CKD stage IIIb with baseline creatinine of around 1.2  Hypothyroidism  Normocytic anemia  Acute urinary retention likely due to UTI    Plan: We will hold Lasix today due to mild deviation of serum creatinine as well as soft BP  Okay to hold rest of her antihypertensive medication this morning  Patient is -2.6 L and her weight has come down to 166 lb from 171lb, with diuresis  Discussed with cardiology NP  Patient responded to the Lasix  Continue IV Rocephin day 3 follow-up urine cultures  Titrate supplemental oxygen to keep SPO2 above 92%  Incentive spirometry every hour while awake  Resume other home medication for her pulmonary  Labs in a.m. Comment: Please note this report has been produced using speech recognition software and may contain errors related to that system including errors in grammar, punctuation, and spelling, as well as words and phrases that may be inappropriate. If there are any questions or concerns please feel free to contact the dictating provider for clarification. Diet ADULT DIET; Regular;  No Added Salt (3-4 gm); 1800 ml   DVT Prophylaxis [] Lovenox, []  Heparin, [x] SCDs, []

## 2023-08-25 NOTE — PROGRESS NOTES
Patient's EF (Ejection Fraction) is greater than 40%    Heart Failure Medications:  Diuretics[de-identified] Furosemide    (One of the following REQUIRED for EF </= 40%/SYSTOLIC FAILURE but MAY be used in EF% >40%/DIASTOLIC FAILURE)        ACE[de-identified] None        ARB[de-identified] None         ARNI[de-identified] None    (Beta Blockers)  NON- Evidenced Based Beta Blocker (for EF% >40%/DIASTOLIC FAILURE): None    Evidenced Based Beta Blocker::(REQUIRED for EF% <40%/SYSTOLIC FAILURE) Metoprolol SUCCinate- Toprol XL  . .................................................................................................................................................. Healthy Weight Tracking - BMI + Meds 11/28/2022 11/28/2022 8/23/2023 8/23/2023 8/24/2023 8/24/2023 8/25/2023   Weight 176 lb 9.4 oz - 171 lb 173 lb 14.4 oz (No Data) 169 lb 9.6 oz 166 lb 11.2 oz   Height 5' 1\" 5' 1\" - 5' 4\" - - -   Body Mass Index 33.36 kg/m2 - - 29.85 kg/m2 - 29.11 kg/m2 28.61 kg/m2   Some recent data might be hidden         Patient's weights and intake/output reviewed: Yes    Daily Weight log at bedside, patient/family participation in use of log: \"yes    Patient's Last Weight: 166  lbs obtained by standing scale. Difference of 3 lbs less than last documented weight.       Intake/Output Summary (Last 24 hours) at 8/25/2023 2070  Last data filed at 8/25/2023 0609  Gross per 24 hour   Intake 530 ml   Output 3199 ml   Net -2669 ml         Education Booklet Provided: yes    Comorbidities Reviewed Yes    Patient has a past medical history of A-fib ([address]), Arthritis, CAD (coronary artery disease), CHF (congestive heart failure) ([address]), Depression, Diphtheria, Gout, Hyperlipemia, Hypertension, STEMI (ST elevation myocardial infarction) ([address]), Thyroid disease, and Wears hearing aid.     >>For CHF and Comorbidity documentation on Education Time and Topics, please see Education Tab    Progressive Mobility Assessment:  What is this patient's Current Level of Mobility?: Ambulatory- with

## 2023-08-26 LAB
ANION GAP SERPL CALCULATED.3IONS-SCNC: 11 MMOL/L (ref 3–16)
BUN SERPL-MCNC: 28 MG/DL (ref 7–20)
CALCIUM SERPL-MCNC: 10 MG/DL (ref 8.3–10.6)
CHLORIDE SERPL-SCNC: 102 MMOL/L (ref 99–110)
CO2 SERPL-SCNC: 24 MMOL/L (ref 21–32)
CREAT SERPL-MCNC: 1.3 MG/DL (ref 0.6–1.2)
GFR SERPLBLD CREATININE-BSD FMLA CKD-EPI: 38 ML/MIN/{1.73_M2}
GLUCOSE BLD-MCNC: 98 MG/DL (ref 70–99)
GLUCOSE SERPL-MCNC: 104 MG/DL (ref 70–99)
MAGNESIUM SERPL-MCNC: 2.2 MG/DL (ref 1.8–2.4)
NT-PROBNP SERPL-MCNC: 2483 PG/ML (ref 0–449)
PERFORMED ON: NORMAL
POTASSIUM SERPL-SCNC: 3.8 MMOL/L (ref 3.5–5.1)
SODIUM SERPL-SCNC: 137 MMOL/L (ref 136–145)

## 2023-08-26 PROCEDURE — 83735 ASSAY OF MAGNESIUM: CPT

## 2023-08-26 PROCEDURE — 6370000000 HC RX 637 (ALT 250 FOR IP): Performed by: INTERNAL MEDICINE

## 2023-08-26 PROCEDURE — 6370000000 HC RX 637 (ALT 250 FOR IP): Performed by: NURSE PRACTITIONER

## 2023-08-26 PROCEDURE — 99232 SBSQ HOSP IP/OBS MODERATE 35: CPT | Performed by: NURSE PRACTITIONER

## 2023-08-26 PROCEDURE — 80048 BASIC METABOLIC PNL TOTAL CA: CPT

## 2023-08-26 PROCEDURE — 1200000000 HC SEMI PRIVATE

## 2023-08-26 PROCEDURE — 83880 ASSAY OF NATRIURETIC PEPTIDE: CPT

## 2023-08-26 PROCEDURE — 2580000003 HC RX 258: Performed by: NURSE PRACTITIONER

## 2023-08-26 RX ADMIN — Medication 10 ML: at 09:02

## 2023-08-26 RX ADMIN — METOPROLOL SUCCINATE 25 MG: 25 TABLET, EXTENDED RELEASE ORAL at 09:02

## 2023-08-26 RX ADMIN — ISOSORBIDE DINITRATE 5 MG: 10 TABLET ORAL at 20:36

## 2023-08-26 RX ADMIN — LEVOTHYROXINE SODIUM 100 MCG: 0.1 TABLET ORAL at 05:03

## 2023-08-26 RX ADMIN — ISOSORBIDE DINITRATE 5 MG: 10 TABLET ORAL at 09:02

## 2023-08-26 RX ADMIN — ATORVASTATIN CALCIUM 10 MG: 10 TABLET, FILM COATED ORAL at 09:02

## 2023-08-26 RX ADMIN — Medication 10 ML: at 20:36

## 2023-08-26 RX ADMIN — ASPIRIN 81 MG: 81 TABLET, COATED ORAL at 09:02

## 2023-08-26 RX ADMIN — APIXABAN 2.5 MG: 2.5 TABLET, FILM COATED ORAL at 09:02

## 2023-08-26 RX ADMIN — HYDRALAZINE HYDROCHLORIDE 10 MG: 10 TABLET, FILM COATED ORAL at 09:02

## 2023-08-26 RX ADMIN — TORSEMIDE 20 MG: 20 TABLET ORAL at 09:02

## 2023-08-26 RX ADMIN — DOCUSATE SODIUM 100 MG: 100 CAPSULE, LIQUID FILLED ORAL at 09:02

## 2023-08-26 RX ADMIN — TAMSULOSIN HYDROCHLORIDE 0.4 MG: 0.4 CAPSULE ORAL at 09:02

## 2023-08-26 RX ADMIN — DOCUSATE SODIUM 100 MG: 100 CAPSULE, LIQUID FILLED ORAL at 20:36

## 2023-08-26 RX ADMIN — APIXABAN 2.5 MG: 2.5 TABLET, FILM COATED ORAL at 20:35

## 2023-08-26 NOTE — PLAN OF CARE
Problem: Safety - Adult  Goal: Free from fall injury  8/26/2023 0948 by Tadeo Bourgeois RN  Outcome: Progressing   Encouraged to call for assistance before getting out of bed, bed alarm on, non skid slipper socks on, call light within reach. Problem: Chronic Conditions and Co-morbidities  Goal: Patient's chronic conditions and co-morbidity symptoms are monitored and maintained or improved  8/26/2023 0948 by Tadeo Bourgeois RN  Outcome: Progressing   Monitor for CHF symptoms. Patient's EF (Ejection Fraction) is less than 40%    Heart Failure Medications:  Diuretics[de-identified] Torsemide    (One of the following REQUIRED for EF </= 40%/SYSTOLIC FAILURE but MAY be used in EF% >40%/DIASTOLIC FAILURE)        ACE[de-identified] None        ARB[de-identified] None         ARNI[de-identified] None    (Beta Blockers)  NON- Evidenced Based Beta Blocker (for EF% >40%/DIASTOLIC FAILURE): None    Evidenced Based Beta Blocker::(REQUIRED for EF% <40%/SYSTOLIC FAILURE) Metoprolol SUCCinate- Toprol XL  . .................................................................................................................................................. Healthy Weight Tracking - BMI + Meds 11/28/2022 8/23/2023 8/23/2023 8/24/2023 8/24/2023 8/25/2023 8/26/2023   Weight - 171 lb 173 lb 14.4 oz (No Data) 169 lb 9.6 oz 166 lb 11.2 oz 166 lb 11.2 oz   Height 5' 1\" - 5' 4\" - - - -   Body Mass Index - - 29.85 kg/m2 - 29.11 kg/m2 28.61 kg/m2 28.61 kg/m2   Some recent data might be hidden         Patient's weights and intake/output reviewed: Yes    Daily Weight log at bedside, patient/family participation in use of log: \"yes    Patient's Last Weight: 166 lbs obtained by standing scale. Difference of 0 lbs  than last documented weight.       Intake/Output Summary (Last 24 hours) at 8/26/2023 0950  Last data filed at 8/26/2023 1305  Gross per 24 hour   Intake 580 ml   Output 650 ml   Net -70 ml       Education Booklet Provided: yes    Comorbidities Reviewed Yes    Patient has a past

## 2023-08-26 NOTE — PLAN OF CARE
Problem: Safety - Adult  Goal: Free from fall injury  Outcome: Progressing  Note: Pt will remain free from falls throughout hospital stay. Fall precautions in place, bed alarm on, bed in lowest position with wheels locked and side rails 2/4 up. Room door open and hourly rounding completed. Will continue to monitor throughout shift.       Problem: ABCDS Injury Assessment  Goal: Absence of physical injury  Outcome: Progressing

## 2023-08-26 NOTE — PROGRESS NOTES
V2.0    [facility] [place] Progress Note      Name:  Cesar Cancer /Age/Sex: 1930  (80 y.o. female)   MRN & CSN:  [id number] & [id number] Encounter Date/Time: 2023 12:05 PM EDT   Location:  [facility] PCP: MD Yolette Coles MD       Hospital Day: 4    Assessment and Recommendations      80 y.o. female, with PMH Of CHF, atrial fibrillation, HTN, CAD, HLD,  who presented to [facility] with shortness of breath, cough and BLE swelling    Acute on chronic combined systolic and diastolic congestive heart failure  Shortness of breath and bilateral lower extremity swelling secondary to above  Probable UTI  Elevated troponin/demand ischemia in the setting of decompensated CHF  Acute hypoxia secondary to decompensated CHF  Hypertension  Hyperlipidemia  CAD  PAF  CKD stage IIIb with baseline creatinine of around 1.2  Hypothyroidism  Normocytic anemia  Acute urinary retention likely due to UTI    Plan:   Serum creatinine trended down to 1.3 from 1.4 yesterday, started on oral diuretics  Patient is -2.6 L and her weight has come down to 166 lb from 171lb, with diuresis  DC IV antibiotic, 3 days is enough,However urine cultures growing mixed harish  Now on room air saturating 93%  Incentive spirometry every hour while awake  Resume other home medication for her pulmonary  Will observe another 24 hours and hopefully discharge tomorrow if remains stable  Labs in a.m. Comment: Please note this report has been produced using speech recognition software and may contain errors related to that system including errors in grammar, punctuation, and spelling, as well as words and phrases that may be inappropriate. If there are any questions or concerns please feel free to contact the dictating provider for clarification. Diet ADULT DIET; Regular;  No Added Salt (3-4 gm); 1800 ml   DVT Prophylaxis [] Lovenox, []  Heparin, [x] SCDs, [] Ambulation,  [x] Eliquis, [] Xarelto   Code airspace opacities. This is favored to represent CHF. However, pneumonia is not fully excluded       CBC:   Recent Labs     08/23/23  1824 08/24/23  0446   WBC 5.4 5.5   HGB 11.1* 11.3*   * 116*       BMP:    Recent Labs     08/24/23  0446 08/25/23  0618 08/26/23  0510    139 137   K 4.1 3.9 3.8    102 102   CO2 24 27 24   BUN 19 27* 28*   CREATININE 1.1 1.4* 1.3*   GLUCOSE 101* 102* 104*       Hepatic:   Recent Labs     08/23/23  1824   AST 20   ALT 12   BILITOT 0.5   ALKPHOS 69       Lipids:   Lab Results   Component Value Date/Time    CHOL 107 08/24/2023 04:46 AM    HDL 43 08/24/2023 04:46 AM    HDL 38 10/10/2011 05:31 AM    TRIG 56 08/24/2023 04:46 AM     Hemoglobin A1C:   Lab Results   Component Value Date/Time    LABA1C 5.9 01/07/2015 05:55 AM     TSH:   Lab Results   Component Value Date/Time    TSH 0.83 08/23/2023 11:53 PM     Troponin: No results found for: TROPONINT  Lactic Acid: No results for input(s): LACTA in the last 72 hours. BNP:   Recent Labs     08/24/23  0848 08/25/23  0618 08/26/23  0510   PROBNP 5,609* 4,397* 2,483*       UA:  Lab Results   Component Value Date/Time    NITRU POSITIVE 08/23/2023 08:21 PM    COLORU Straw 08/23/2023 08:21 PM    PHUR 6.5 08/23/2023 08:21 PM    LABCAST 0-1 Hyaline 08/05/2014 11:28 AM    WBCUA >100 08/23/2023 08:21 PM    RBCUA 5-10 08/23/2023 08:21 PM    BACTERIA 3+ 08/23/2023 08:21 PM    CLARITYU SL CLOUDY 08/23/2023 08:21 PM    SPECGRAV <=1.005 08/23/2023 08:21 PM    LEUKOCYTESUR LARGE 08/23/2023 08:21 PM    UROBILINOGEN 0.2 08/23/2023 08:21 PM    BILIRUBINUR Negative 08/23/2023 08:21 PM    BILIRUBINUR neg 09/30/2012 03:02 PM    BLOODU TRACE-INTACT 08/23/2023 08:21 PM    GLUCOSEU Negative 08/23/2023 08:21 PM    KETUA Negative 08/23/2023 08:21 PM     Urine Cultures:   Lab Results   Component Value Date/Time    LABURIN  08/23/2023 08:21 PM     >50,000 CFU/ml mixed skin/urogenital harish.  No further workup     Blood Cultures:   Lab Results

## 2023-08-26 NOTE — PLAN OF CARE
Problem: Chronic Conditions and Co-morbidities  Goal: Patient's chronic conditions and co-morbidity symptoms are monitored and maintained or improved  Outcome: Progressing  Note:   Patient's EF (Ejection Fraction) is less than 40%    Heart Failure Medications:  Diuretics[de-identified] Torsemide -to start today    (One of the following REQUIRED for EF </= 40%/SYSTOLIC FAILURE but MAY be used in EF% >40%/DIASTOLIC FAILURE)        ACE[de-identified] None        ARB[de-identified] None         ARNI[de-identified] None    (Beta Blockers)  NON- Evidenced Based Beta Blocker (for EF% >40%/DIASTOLIC FAILURE): None    Evidenced Based Beta Blocker::(REQUIRED for EF% <40%/SYSTOLIC FAILURE) Metoprolol SUCCinate- Toprol XL  . .................................................................................................................................................. Healthy Weight Tracking - BMI + Meds 11/28/2022 11/28/2022 8/23/2023 8/23/2023 8/24/2023 8/24/2023 8/25/2023   Weight 176 lb 9.4 oz - 171 lb 173 lb 14.4 oz (No Data) 169 lb 9.6 oz 166 lb 11.2 oz   Height 5' 1\" 5' 1\" - 5' 4\" - - -   Body Mass Index 33.36 kg/m2 - - 29.85 kg/m2 - 29.11 kg/m2 28.61 kg/m2   Some recent data might be hidden         Patient's weights and intake/output reviewed: Yes    Daily Weight log at bedside, patient/family participation in use of log: \"yes    Patient's Last Weight: 166 lbs obtained by standing scale. Difference of 3 lbs less than last documented weight.       Intake/Output Summary (Last 24 hours) at 8/26/2023 0346  Last data filed at 8/25/2023 2257  Gross per 24 hour   Intake 650 ml   Output 674 ml   Net -24 ml       Education Booklet Provided: yes    Comorbidities Reviewed Yes    Patient has a past medical history of A-fib ([address]), Arthritis, CAD (coronary artery disease), CHF (congestive heart failure) ([address]), Depression, Diphtheria, Gout, Hyperlipemia, Hypertension, STEMI (ST elevation myocardial infarction) ([address]), Thyroid disease, and Wears hearing aid.     >>For CHF and

## 2023-08-27 LAB
ANION GAP SERPL CALCULATED.3IONS-SCNC: 12 MMOL/L (ref 3–16)
BUN SERPL-MCNC: 31 MG/DL (ref 7–20)
CALCIUM SERPL-MCNC: 9.4 MG/DL (ref 8.3–10.6)
CHLORIDE SERPL-SCNC: 103 MMOL/L (ref 99–110)
CO2 SERPL-SCNC: 25 MMOL/L (ref 21–32)
CREAT SERPL-MCNC: 1.4 MG/DL (ref 0.6–1.2)
GFR SERPLBLD CREATININE-BSD FMLA CKD-EPI: 35 ML/MIN/{1.73_M2}
GLUCOSE SERPL-MCNC: 99 MG/DL (ref 70–99)
MAGNESIUM SERPL-MCNC: 2.1 MG/DL (ref 1.8–2.4)
POTASSIUM SERPL-SCNC: 4 MMOL/L (ref 3.5–5.1)
SODIUM SERPL-SCNC: 140 MMOL/L (ref 136–145)

## 2023-08-27 PROCEDURE — 6370000000 HC RX 637 (ALT 250 FOR IP): Performed by: NURSE PRACTITIONER

## 2023-08-27 PROCEDURE — 6370000000 HC RX 637 (ALT 250 FOR IP): Performed by: STUDENT IN AN ORGANIZED HEALTH CARE EDUCATION/TRAINING PROGRAM

## 2023-08-27 PROCEDURE — 2700000000 HC OXYGEN THERAPY PER DAY

## 2023-08-27 PROCEDURE — 99232 SBSQ HOSP IP/OBS MODERATE 35: CPT | Performed by: NURSE PRACTITIONER

## 2023-08-27 PROCEDURE — 6360000002 HC RX W HCPCS: Performed by: NURSE PRACTITIONER

## 2023-08-27 PROCEDURE — 83735 ASSAY OF MAGNESIUM: CPT

## 2023-08-27 PROCEDURE — 1200000000 HC SEMI PRIVATE

## 2023-08-27 PROCEDURE — 6370000000 HC RX 637 (ALT 250 FOR IP): Performed by: INTERNAL MEDICINE

## 2023-08-27 PROCEDURE — 80048 BASIC METABOLIC PNL TOTAL CA: CPT

## 2023-08-27 PROCEDURE — 2580000003 HC RX 258: Performed by: NURSE PRACTITIONER

## 2023-08-27 PROCEDURE — 36415 COLL VENOUS BLD VENIPUNCTURE: CPT

## 2023-08-27 RX ORDER — FUROSEMIDE 10 MG/ML
20 INJECTION INTRAMUSCULAR; INTRAVENOUS ONCE
Status: COMPLETED | OUTPATIENT
Start: 2023-08-27 | End: 2023-08-27

## 2023-08-27 RX ORDER — MINERAL OIL AND WHITE PETROLATUM 150; 830 MG/G; MG/G
OINTMENT OPHTHALMIC PRN
Status: DISCONTINUED | OUTPATIENT
Start: 2023-08-27 | End: 2023-08-29 | Stop reason: HOSPADM

## 2023-08-27 RX ADMIN — DOCUSATE SODIUM 100 MG: 100 CAPSULE, LIQUID FILLED ORAL at 20:33

## 2023-08-27 RX ADMIN — Medication 10 ML: at 09:24

## 2023-08-27 RX ADMIN — TORSEMIDE 20 MG: 20 TABLET ORAL at 09:21

## 2023-08-27 RX ADMIN — DOCUSATE SODIUM 100 MG: 100 CAPSULE, LIQUID FILLED ORAL at 09:21

## 2023-08-27 RX ADMIN — Medication 10 ML: at 20:34

## 2023-08-27 RX ADMIN — WHITE PETROLATUM 57.7 %-MINERAL OIL 31.9 % EYE OINTMENT: at 18:03

## 2023-08-27 RX ADMIN — ATORVASTATIN CALCIUM 10 MG: 10 TABLET, FILM COATED ORAL at 09:21

## 2023-08-27 RX ADMIN — APIXABAN 2.5 MG: 2.5 TABLET, FILM COATED ORAL at 09:21

## 2023-08-27 RX ADMIN — ASPIRIN 81 MG: 81 TABLET, COATED ORAL at 09:21

## 2023-08-27 RX ADMIN — METOPROLOL SUCCINATE 25 MG: 25 TABLET, EXTENDED RELEASE ORAL at 09:21

## 2023-08-27 RX ADMIN — APIXABAN 2.5 MG: 2.5 TABLET, FILM COATED ORAL at 20:33

## 2023-08-27 RX ADMIN — LEVOTHYROXINE SODIUM 100 MCG: 0.1 TABLET ORAL at 07:16

## 2023-08-27 RX ADMIN — TAMSULOSIN HYDROCHLORIDE 0.4 MG: 0.4 CAPSULE ORAL at 09:21

## 2023-08-27 RX ADMIN — FUROSEMIDE 20 MG: 10 INJECTION, SOLUTION INTRAMUSCULAR; INTRAVENOUS at 16:00

## 2023-08-27 ASSESSMENT — PAIN SCALES - GENERAL: PAINLEVEL_OUTOF10: 0

## 2023-08-27 NOTE — PLAN OF CARE
Problem: Discharge Planning  Goal: Discharge to home or other facility with appropriate resources  Outcome: Progressing  Flowsheets (Taken 8/26/2023 2200)  Discharge to home or other facility with appropriate resources:   Identify barriers to discharge with patient and caregiver   Arrange for needed discharge resources and transportation as appropriate   Identify discharge learning needs (meds, wound care, etc)   Refer to discharge planning if patient needs post-hospital services based on physician order or complex needs related to functional status, cognitive ability or social support system     Problem: Safety - Adult  Goal: Free from fall injury  8/26/2023 2200 by Brody Cazares RN  Outcome: Progressing  Note: Pt at risk for accidental injury. Pt will remain free from injury during admission. Pt educated on the use of the call light and the need to have an active bed alarm. Pt will call out prior to ambulation. Pt's pathway will remain free from tripping hazards. Pt's bed is in lowest position, call light in reach, and active and audible bed alarm. Problem: Chronic Conditions and Co-morbidities  Goal: Patient's chronic conditions and co-morbidity symptoms are monitored and maintained or improved  8/26/2023 2200 by Brody Cazares RN  Outcome: Progressing  Note:   Patient's EF (Ejection Fraction) is less than 40%    Heart Failure Medications:  Diuretics[de-identified] Torsemide    (One of the following REQUIRED for EF </= 40%/SYSTOLIC FAILURE but MAY be used in EF% >40%/DIASTOLIC FAILURE)        ACE[de-identified] None        ARB[de-identified] None         ARNI[de-identified] None    (Beta Blockers)  NON- Evidenced Based Beta Blocker (for EF% >40%/DIASTOLIC FAILURE): None    Evidenced Based Beta Blocker::(REQUIRED for EF% <40%/SYSTOLIC FAILURE) Metoprolol SUCCinate- Toprol XL  . ..................................................................................................................................................     Healthy Weight Tracking

## 2023-08-27 NOTE — PROGRESS NOTES
bases posteriorly on auscultation  Gastrointestinal: Soft, non tender  Genitourinary: no suprapubic tenderness  Musculoskeletal: Trace pedal edema  Skin: warm, dry  Neuro: Alert. Psych: Mood appropriate. Medications:   Medications:    furosemide  20 mg IntraVENous Once    torsemide  20 mg Oral Daily    aspirin  81 mg Oral Daily    tamsulosin  0.4 mg Oral Daily    [Held by provider] isosorbide dinitrate  5 mg Oral BID    [Held by provider] hydrALAZINE  10 mg Oral 2 times per day    apixaban  2.5 mg Oral BID    atorvastatin  10 mg Oral Daily    docusate sodium  100 mg Oral BID    levothyroxine  100 mcg Oral QAM AC    methenamine  1 g Oral BID    metoprolol succinate  25 mg Oral Daily    sodium chloride flush  5-40 mL IntraVENous 2 times per day      Infusions:    sodium chloride       PRN Meds: meclizine, 25 mg, TID PRN  sodium chloride flush, 5-40 mL, PRN  sodium chloride, , PRN  ondansetron, 4 mg, Q8H PRN   Or  ondansetron, 4 mg, Q6H PRN  polyethylene glycol, 17 g, Daily PRN  acetaminophen, 650 mg, Q6H PRN   Or  acetaminophen, 650 mg, Q6H PRN  potassium chloride, 40 mEq, PRN   Or  potassium alternative oral replacement, 40 mEq, PRN   Or  potassium chloride, 10 mEq, PRN  magnesium sulfate, 2,000 mg, PRN        Labs and Imaging   CT ABDOMEN PELVIS WO CONTRAST Additional Contrast? None    Result Date: 8/23/2023  EXAMINATION: CT OF THE ABDOMEN AND PELVIS WITHOUT CONTRAST 8/23/2023 9:40 pm TECHNIQUE: CT of the abdomen and pelvis was performed without the administration of intravenous contrast. Multiplanar reformatted images are provided for review. Automated exposure control, iterative reconstruction, and/or weight based adjustment of the mA/kV was utilized to reduce the radiation dose to as low as reasonably achievable.  COMPARISON: 06/22/2019 HISTORY: ORDERING SYSTEM PROVIDED HISTORY: uti TECHNOLOGIST PROVIDED HISTORY: Reason for exam:->uti Additional Contrast?->None Decision Support Exception - unselect if not HISTORY: ORDERING SYSTEM PROVIDED HISTORY: chf TECHNOLOGIST PROVIDED HISTORY: Reason for exam:->chf Reason for Exam: Shortness of Breath FINDINGS: Small bilateral pleural effusions with perihilar and bibasilar airspace opacities. No pneumothorax. Stable cardiomediastinal silhouette     Small bilateral pleural effusions with perihilar and bibasilar airspace opacities. This is favored to represent CHF. However, pneumonia is not fully excluded       CBC:   No results for input(s): WBC, HGB, PLT in the last 72 hours. BMP:    Recent Labs     08/25/23  0618 08/26/23  0510 08/27/23  0504    137 140   K 3.9 3.8 4.0    102 103   CO2 27 24 25   BUN 27* 28* 31*   CREATININE 1.4* 1.3* 1.4*   GLUCOSE 102* 104* 99       Hepatic:   No results for input(s): AST, ALT, ALB, BILITOT, ALKPHOS in the last 72 hours. Lipids:   Lab Results   Component Value Date/Time    CHOL 107 08/24/2023 04:46 AM    HDL 43 08/24/2023 04:46 AM    HDL 38 10/10/2011 05:31 AM    TRIG 56 08/24/2023 04:46 AM     Hemoglobin A1C:   Lab Results   Component Value Date/Time    LABA1C 5.9 01/07/2015 05:55 AM     TSH:   Lab Results   Component Value Date/Time    TSH 0.83 08/23/2023 11:53 PM     Troponin: No results found for: TROPONINT  Lactic Acid: No results for input(s): LACTA in the last 72 hours.   BNP:   Recent Labs     08/25/23  0618 08/26/23  0510   PROBNP 4,397* 2,483*       UA:  Lab Results   Component Value Date/Time    NITRU POSITIVE 08/23/2023 08:21 PM    COLORU Straw 08/23/2023 08:21 PM    PHUR 6.5 08/23/2023 08:21 PM    LABCAST 0-1 Hyaline 08/05/2014 11:28 AM    WBCUA >100 08/23/2023 08:21 PM    RBCUA 5-10 08/23/2023 08:21 PM    BACTERIA 3+ 08/23/2023 08:21 PM    CLARITYU SL CLOUDY 08/23/2023 08:21 PM    SPECGRAV <=1.005 08/23/2023 08:21 PM    LEUKOCYTESUR LARGE 08/23/2023 08:21 PM    UROBILINOGEN 0.2 08/23/2023 08:21 PM    BILIRUBINUR Negative 08/23/2023 08:21 PM    BILIRUBINUR neg 09/30/2012 03:02 PM    BLOODU TRACE-INTACT

## 2023-08-27 NOTE — PLAN OF CARE
Problem: Safety - Adult  Goal: Free from fall injury  8/27/2023 1113 by Deja Dorman RN  Outcome: Progressing   Encouraged to call for assistance before getting out of bed, call light within reach, non skid slipper socks on. Problem: ABCDS Injury Assessment  Goal: Absence of physical injury  Outcome: Progressing     Problem: Chronic Conditions and Co-morbidities  Goal: Patient's chronic conditions and co-morbidity symptoms are monitored and maintained or improved  8/27/2023 1113 by Deja Dorman RN  Outcome: Progressing   Monitor intake and output every shift. Patient's EF (Ejection Fraction) is less than 40%    Heart Failure Medications:  Diuretics[de-identified] Furosemide and Torsemide    (One of the following REQUIRED for EF </= 40%/SYSTOLIC FAILURE but MAY be used in EF% >40%/DIASTOLIC FAILURE)        ACE[de-identified] None        ARB[de-identified] None         ARNI[de-identified] None    (Beta Blockers)  NON- Evidenced Based Beta Blocker (for EF% >40%/DIASTOLIC FAILURE): None    Evidenced Based Beta Blocker::(REQUIRED for EF% <40%/SYSTOLIC FAILURE) Metoprolol SUCCinate- Toprol XL  . .................................................................................................................................................. Healthy Weight Tracking - BMI + Meds 8/23/2023 8/23/2023 8/24/2023 8/24/2023 8/25/2023 8/26/2023 8/27/2023   Weight 171 lb 173 lb 14.4 oz (No Data) 169 lb 9.6 oz 166 lb 11.2 oz 166 lb 11.2 oz 166 lb 14.4 oz   Height - 5' 4\" - - - - -   Body Mass Index - 29.85 kg/m2 - 29.11 kg/m2 28.61 kg/m2 28.61 kg/m2 28.65 kg/m2   Some recent data might be hidden         Patient's weights and intake/output reviewed: Yes    Daily Weight log at bedside, patient/family participation in use of log: \"yes    Patient's Last Weight: 166 lbs obtained by standing scale. Difference of 0 lbs     than last documented weight.       Intake/Output Summary (Last 24 hours) at 8/27/2023 1115  Last data filed at 8/27/2023 0921  Gross per 24 hour   Intake 960 ml   Output 1850 ml   Net -890 ml       Education Booklet Provided: yes    Comorbidities Reviewed Yes    Patient has a past medical history of A-fib ([address]), Arthritis, CAD (coronary artery disease), CHF (congestive heart failure) ([address]), Depression, Diphtheria, Gout, Hyperlipemia, Hypertension, STEMI (ST elevation myocardial infarction) ([address]), Thyroid disease, and Wears hearing aid.     >>For CHF and Comorbidity documentation on Education Time and Topics, please see Education Tab    Progressive Mobility Assessment:  What is this patient's Current Level of Mobility?: Ambulatory- with Assistance  How was this patient Mobilized today?: Edge of Bed, Up to Chair,  Up to Toilet/Shower, and Up in Room, ambulated 15 ft                 With Whom? Nurse and PCA                 Level of Difficulty/Assistance: 1x Assist     Pt up in chair at this time on  1 L O2. Pt with complaints of shortness of breath. Pt with nonpitting lower extremity edema.      Patient and/or Family's stated Goal of Care this Admission: reduce shortness of breath, increase activity tolerance, better understand heart failure and disease management, be more comfortable, and reduce lower extremity edema prior to discharge        :

## 2023-08-28 ENCOUNTER — APPOINTMENT (OUTPATIENT)
Dept: GENERAL RADIOLOGY | Age: 88
DRG: 291 | End: 2023-08-28
Payer: MEDICARE

## 2023-08-28 LAB
ANION GAP SERPL CALCULATED.3IONS-SCNC: 11 MMOL/L (ref 3–16)
BUN SERPL-MCNC: 30 MG/DL (ref 7–20)
CALCIUM SERPL-MCNC: 9.5 MG/DL (ref 8.3–10.6)
CHLORIDE SERPL-SCNC: 101 MMOL/L (ref 99–110)
CO2 SERPL-SCNC: 27 MMOL/L (ref 21–32)
CREAT SERPL-MCNC: 1.3 MG/DL (ref 0.6–1.2)
GFR SERPLBLD CREATININE-BSD FMLA CKD-EPI: 38 ML/MIN/{1.73_M2}
GLUCOSE SERPL-MCNC: 102 MG/DL (ref 70–99)
MAGNESIUM SERPL-MCNC: 2.1 MG/DL (ref 1.8–2.4)
POTASSIUM SERPL-SCNC: 3.6 MMOL/L (ref 3.5–5.1)
SODIUM SERPL-SCNC: 139 MMOL/L (ref 136–145)

## 2023-08-28 PROCEDURE — 83735 ASSAY OF MAGNESIUM: CPT

## 2023-08-28 PROCEDURE — 2580000003 HC RX 258: Performed by: NURSE PRACTITIONER

## 2023-08-28 PROCEDURE — 6370000000 HC RX 637 (ALT 250 FOR IP): Performed by: INTERNAL MEDICINE

## 2023-08-28 PROCEDURE — 6370000000 HC RX 637 (ALT 250 FOR IP): Performed by: NURSE PRACTITIONER

## 2023-08-28 PROCEDURE — 1200000000 HC SEMI PRIVATE

## 2023-08-28 PROCEDURE — 74019 RADEX ABDOMEN 2 VIEWS: CPT

## 2023-08-28 PROCEDURE — 36415 COLL VENOUS BLD VENIPUNCTURE: CPT

## 2023-08-28 PROCEDURE — 99233 SBSQ HOSP IP/OBS HIGH 50: CPT | Performed by: NURSE PRACTITIONER

## 2023-08-28 PROCEDURE — 80048 BASIC METABOLIC PNL TOTAL CA: CPT

## 2023-08-28 RX ORDER — BISACODYL 10 MG
10 SUPPOSITORY, RECTAL RECTAL ONCE
Status: COMPLETED | OUTPATIENT
Start: 2023-08-28 | End: 2023-08-28

## 2023-08-28 RX ADMIN — TAMSULOSIN HYDROCHLORIDE 0.4 MG: 0.4 CAPSULE ORAL at 10:00

## 2023-08-28 RX ADMIN — APIXABAN 2.5 MG: 2.5 TABLET, FILM COATED ORAL at 20:20

## 2023-08-28 RX ADMIN — WHITE PETROLATUM 57.7 %-MINERAL OIL 31.9 % EYE OINTMENT: at 10:00

## 2023-08-28 RX ADMIN — LEVOTHYROXINE SODIUM 100 MCG: 0.1 TABLET ORAL at 05:51

## 2023-08-28 RX ADMIN — DOCUSATE SODIUM 100 MG: 100 CAPSULE, LIQUID FILLED ORAL at 10:00

## 2023-08-28 RX ADMIN — TORSEMIDE 20 MG: 20 TABLET ORAL at 10:00

## 2023-08-28 RX ADMIN — ATORVASTATIN CALCIUM 10 MG: 10 TABLET, FILM COATED ORAL at 10:00

## 2023-08-28 RX ADMIN — BISACODYL 10 MG: 10 SUPPOSITORY RECTAL at 16:21

## 2023-08-28 RX ADMIN — APIXABAN 2.5 MG: 2.5 TABLET, FILM COATED ORAL at 10:00

## 2023-08-28 RX ADMIN — Medication 10 ML: at 20:20

## 2023-08-28 RX ADMIN — METOPROLOL SUCCINATE 25 MG: 25 TABLET, EXTENDED RELEASE ORAL at 10:00

## 2023-08-28 RX ADMIN — POLYETHYLENE GLYCOL 3350 17 G: 17 POWDER, FOR SOLUTION ORAL at 16:21

## 2023-08-28 RX ADMIN — ASPIRIN 81 MG: 81 TABLET, COATED ORAL at 10:00

## 2023-08-28 NOTE — PLAN OF CARE
Problem: Discharge Planning  Goal: Discharge to home or other facility with appropriate resources  Outcome: Progressing     Problem: Safety - Adult  Goal: Free from fall injury  8/28/2023 0013 by Main Gramajo RN  Outcome: Progressing  Note: Pt will remain free from falls throughout hospital stay. Fall precautions in place, bed alarm on, bed in lowest position with wheels locked and side rails 2/4 up. Problem: Chronic Conditions and Co-morbidities  Goal: Patient's chronic conditions and co-morbidity symptoms are monitored and maintained or improved  8/28/2023 0013 by Main Gramajo RN  Outcome: Progressing  Note:   Patient's EF (Ejection Fraction) is less than 40%    Heart Failure Medications:  Diuretics[de-identified] Torsemide    (One of the following REQUIRED for EF </= 40%/SYSTOLIC FAILURE but MAY be used in EF% >40%/DIASTOLIC FAILURE)        ACE[de-identified] None        ARB[de-identified] None         ARNI[de-identified] None    (Beta Blockers)  NON- Evidenced Based Beta Blocker (for EF% >40%/DIASTOLIC FAILURE): None    Evidenced Based Beta Blocker::(REQUIRED for EF% <40%/SYSTOLIC FAILURE) Metoprolol SUCCinate- Toprol XL  . .................................................................................................................................................. Healthy Weight Tracking - BMI + Meds 8/23/2023 8/23/2023 8/24/2023 8/24/2023 8/25/2023 8/26/2023 8/27/2023   Weight 171 lb 173 lb 14.4 oz (No Data) 169 lb 9.6 oz 166 lb 11.2 oz 166 lb 11.2 oz 166 lb 14.4 oz   Height - 5' 4\" - - - - -   Body Mass Index - 29.85 kg/m2 - 29.11 kg/m2 28.61 kg/m2 28.61 kg/m2 28.65 kg/m2   Some recent data might be hidden         Patient's weights and intake/output reviewed: Yes    Daily Weight log at bedside, patient/family participation in use of log: \"yes    Patient's Last Weight: 166 lbs obtained by standing scale. Difference of 0 lbs than last documented weight.       Intake/Output Summary (Last 24 hours) at 8/28/2023 0012  Last data filed at 8/27/2023

## 2023-08-28 NOTE — CARE COORDINATION
Spoke with RN and MD both who state patient could potentially discharge pending xray. Spoke with patient and patient's daughter at bedside. They are hopeful that patient will be able to stay one more night due to patient just now getting a little more strength back. Patient is from home alone and independent at home. She will be discharging with [facility].

## 2023-08-28 NOTE — DISCHARGE INSTR - COC
Continuity of Care Form    Patient Name: Tamir Amaya   :  1930  MRN:  [id number]    Admit date:  2023  Discharge date:  2023    Code Status Order: Full Code   Advance Directives:     Admitting Physician:  Red Valles MD  PCP: Nat Felipe MD    Discharging Nurse: [facility] Unit/Room#: 0209/0209-01  Discharging Unit Phone Number: [phone]    Emergency Contact:   Extended Emergency Contact Information  Primary Emergency Contact: [address] [place] [address], [address] Phone: [phone]  Relation: Child  Preferred language: English   needed? No  Secondary Emergency Contact: Sheldon Horta [facility] [address] [address] [place] Phone: [phone]  Mobile Phone: [phone]  Relation: Grandchild    Past Surgical History:  Past Surgical History:   Procedure Laterality Date    APPENDECTOMY      BREAST SURGERY      biopsy benign    CARDIAC SURGERY      stent    CATARACT REMOVAL WITH IMPLANT Right 10/11/2017    COLONOSCOPY  2013    HYSTERECTOMY (CERVIX STATUS UNKNOWN)      JOINT REPLACEMENT Left 14    LEFT TOTAL KNEE REPLACEMENT WITH FEMORAL NERVE BLOCK FOR PAIN    OTHER SURGICAL HISTORY Right 14    right total knee replacement    TONSILLECTOMY         Immunization History: There is no immunization history for the selected administration types on file for this patient.     Active Problems:  Patient Active Problem List   Diagnosis Code    Anginal pain ([address]) I20.9    CAD (coronary artery disease) I25.10    Dyspnea on exertion R06.09    Primary localized osteoarthrosis, lower leg M17.10    Left TKR Z96.659    HTN (hypertension) I10    Hyperlipidemia E78.5    Right TKR Z96.659    Trochanteric bursitis of right hip M70.61    Syncope and collapse R55    Acute coronary syndrome (HCC) I24.9    Paroxysmal atrial fibrillation (HCC) I48.0    SVT (supraventricular tachycardia) (HCC) I47.1    Ischemic cardiomyopathy I25.5    Acute respiratory failure with hypoxia ([address])

## 2023-08-28 NOTE — PROGRESS NOTES
Hospital Medicine Progress Note      Date of Admission: 8/23/2023  Hospital Day: 6    Chief Admission Complaint:  Shortness of breath, cough and BLE swelling        Subjective:  having abd pain today that is ongoing    Presenting Admission History:       80 y.o. female, with PMH Of CHF, atrial fibrillation, HTN, CAD, HLD,  who presented to [facility] with shortness of breath, cough and BLE swelling. History obtained from the patient and review of EMR. Patient and the patient's daughter at the bedside stated that she started feeling short of breath and feeling like she is wheezing with each breath. Patient's daughter took patient to the ER today for an evaluation. During assessment, wheezing was ausculted in the upper and mid lobe regions, heart sounds were normal w/o murmurs, bilateral edema was found in the lower extremities +2 and pitting. Patient stated that she is a CKD patient and follows with Dr. Ivette Sandoval nephrologist. Patient also stated that her cardiologist who she's been seeing plan to change her spirolactone to lasix. Besides her respiratory complaints, she was found to have acute cystitis from her UA and urine culture was ordered along with rocephin. CT of abdomen and chest was ordered for further evaluation. The patient denied any other associated symptoms as well as any aggravating and/or alleviating factors. At the time of this assessment, the patient was resting comfortably in bed. She currently denies any chest pain, back pain, abdominal pain, shortness of breath, numbness, tingling, N/V/C/D, fever and/or chills. Assessment/Plan:      Current Principal Problem:  Acute on chronic combined systolic and diastolic CHF (congestive heart failure) (HCC)    Shortness of breath, cough and BLE swelling in setting of acute on chronic combined systolic and diastolic CHF exacerbation  -CXR revealed: tiny pleural effusions  -ProBNP 5,289  -left ventricular systolic function mildly reduces with EF 45%.

## 2023-08-28 NOTE — PLAN OF CARE
Problem: Discharge Planning  Goal: Discharge to home or other facility with appropriate resources  Recent Flowsheet Documentation  Taken 8/28/2023 0800 by Eliud Man RN  Discharge to home or other facility with appropriate resources: Refer to discharge planning if patient needs post-hospital services based on physician order or complex needs related to functional status, cognitive ability or social support system  8/28/2023 0013 by Main Gramajo RN  Outcome: Progressing     Problem: Safety - Adult  Goal: Free from fall injury  8/28/2023 1021 by Eliud Man RN  Outcome: Progressing  8/28/2023 0013 by Main Gramajo RN  Outcome: Progressing  Note: Pt will remain free from falls throughout hospital stay. Fall precautions in place, bed alarm on, bed in lowest position with wheels locked and side rails 2/4 up.       Problem: ABCDS Injury Assessment  Goal: Absence of physical injury  Outcome: Progressing     Problem: Pain  Goal: Verbalizes/displays adequate comfort level or baseline comfort level  Outcome: Progressing

## 2023-08-29 VITALS
TEMPERATURE: 97.5 F | HEART RATE: 66 BPM | SYSTOLIC BLOOD PRESSURE: 115 MMHG | RESPIRATION RATE: 16 BRPM | WEIGHT: 164.7 LBS | OXYGEN SATURATION: 94 % | HEIGHT: 64 IN | DIASTOLIC BLOOD PRESSURE: 72 MMHG | BODY MASS INDEX: 28.12 KG/M2

## 2023-08-29 LAB — NT-PROBNP SERPL-MCNC: 2805 PG/ML (ref 0–449)

## 2023-08-29 PROCEDURE — 6370000000 HC RX 637 (ALT 250 FOR IP): Performed by: NURSE PRACTITIONER

## 2023-08-29 PROCEDURE — 83880 ASSAY OF NATRIURETIC PEPTIDE: CPT

## 2023-08-29 PROCEDURE — 6370000000 HC RX 637 (ALT 250 FOR IP): Performed by: INTERNAL MEDICINE

## 2023-08-29 PROCEDURE — 36415 COLL VENOUS BLD VENIPUNCTURE: CPT

## 2023-08-29 RX ORDER — METOPROLOL SUCCINATE 25 MG/1
25 TABLET, EXTENDED RELEASE ORAL DAILY
Qty: 30 TABLET | Refills: 3 | Status: SHIPPED | OUTPATIENT
Start: 2023-08-30

## 2023-08-29 RX ORDER — TAMSULOSIN HYDROCHLORIDE 0.4 MG/1
0.4 CAPSULE ORAL DAILY
Qty: 30 CAPSULE | Refills: 1 | Status: SHIPPED | OUTPATIENT
Start: 2023-08-30

## 2023-08-29 RX ORDER — CALCIUM POLYCARBOPHIL 625 MG 625 MG/1
625 TABLET ORAL
Status: DISCONTINUED | OUTPATIENT
Start: 2023-08-29 | End: 2023-08-29 | Stop reason: HOSPADM

## 2023-08-29 RX ORDER — ATORVASTATIN CALCIUM 10 MG/1
10 TABLET, FILM COATED ORAL DAILY
Qty: 30 TABLET | Refills: 3 | Status: SHIPPED | OUTPATIENT
Start: 2023-08-30

## 2023-08-29 RX ORDER — TORSEMIDE 20 MG/1
20 TABLET ORAL DAILY
Qty: 30 TABLET | Refills: 1 | Status: SHIPPED | OUTPATIENT
Start: 2023-08-30

## 2023-08-29 RX ORDER — CALCIUM POLYCARBOPHIL 625 MG 625 MG/1
625 TABLET ORAL
Refills: 4 | COMMUNITY
Start: 2023-08-29

## 2023-08-29 RX ADMIN — ATORVASTATIN CALCIUM 10 MG: 10 TABLET, FILM COATED ORAL at 09:49

## 2023-08-29 RX ADMIN — LEVOTHYROXINE SODIUM 100 MCG: 0.1 TABLET ORAL at 05:01

## 2023-08-29 RX ADMIN — DOCUSATE SODIUM 100 MG: 100 CAPSULE, LIQUID FILLED ORAL at 09:49

## 2023-08-29 RX ADMIN — TORSEMIDE 20 MG: 20 TABLET ORAL at 09:49

## 2023-08-29 RX ADMIN — TAMSULOSIN HYDROCHLORIDE 0.4 MG: 0.4 CAPSULE ORAL at 09:49

## 2023-08-29 RX ADMIN — ASPIRIN 81 MG: 81 TABLET, COATED ORAL at 09:49

## 2023-08-29 RX ADMIN — METOPROLOL SUCCINATE 25 MG: 25 TABLET, EXTENDED RELEASE ORAL at 09:49

## 2023-08-29 RX ADMIN — POLYETHYLENE GLYCOL 3350 17 G: 17 POWDER, FOR SOLUTION ORAL at 10:31

## 2023-08-29 RX ADMIN — APIXABAN 2.5 MG: 2.5 TABLET, FILM COATED ORAL at 09:49

## 2023-08-29 NOTE — DISCHARGE SUMMARY
Hospital Medicine Discharge Summary    Patient: Cecilia Viera   : 1930     Admit Date: 2023   Discharge Date: 2023    Disposition:  [x]Home   [x]HHC  []SNF  []Acute Rehab  []LTAC  []Hospice  Code status:  [x]Full  []DNR/CCA  []Limited (DNR/CCA with Do Not Intubate)  []DNRCC  Condition at Discharge: Stable  Primary Care Provider: Fernando Nevarez MD    Admitting Provider: Edison Pinto MD  Discharge Provider: Nnamdi Boudreaux MD     Discharge Diagnoses: Active Hospital Problems    Diagnosis     Atrial fibrillation, chronic (HCC) [I48.20]      Priority: Medium    Urinary tract infection with hematuria [N39.0, R31.9]     Acute renal failure with acute renal cortical necrosis superimposed on stage 3b chronic kidney disease (HCC) [N17.1, N18.32]     Acute on chronic combined systolic and diastolic CHF (congestive heart failure) (HCC) [I50.43]     HTN (hypertension) [I10]     Hyperlipidemia [E78.5]     CAD (coronary artery disease) [I25.10]        Presenting Admission History:      80 y.o. female, with PMH Of CHF, atrial fibrillation, HTN, CAD, HLD,  who presented to [facility] with shortness of breath, cough and BLE swelling. History obtained from the patient and review of EMR. Patient and the patient's daughter at the bedside stated that she started feeling short of breath and feeling like she is wheezing with each breath. Patient's daughter took patient to the ER today for an evaluation. During assessment, wheezing was ausculted in the upper and mid lobe regions, heart sounds were normal w/o murmurs, bilateral edema was found in the lower extremities +2 and pitting. Patient stated that she is a CKD patient and follows with Dr. Jessie Christine nephrologist. Patient also stated that her cardiologist who she's been seeing plan to change her spirolactone to lasix.  Besides her respiratory complaints, she was found to have acute cystitis from her UA and urine culture was ordered along with

## 2023-08-29 NOTE — CARE COORDINATION
CASE MANAGEMENT DISCHARGE SUMMARY      Discharge to: Home with home care    IMM given: 8/29/23    New Durable Medical Equipment ordered/agency:     Transportation:    Family/car:Private       Confirmed discharge plan with:     Patient: yes     Family:  yes>per patient     Facility/Agency, name:     [facility]  Address: [address] [place] [address]  Phone: [phone] [facility] faxed        RN, name: Alla Diaz RN    Note: Discharging nurse to complete BRIAN, reconcile AVS, and place final copy with patient's discharge packet. RN to ensure that written prescriptions for  Level II medications are sent with patient to the facility as per protocol.

## 2023-08-29 NOTE — PROGRESS NOTES
Pt ok for discharge per MD. Discharge instructions and script given. IV removed. Telemetry monitor removed and CMU notified. No questions or concerns at this time. Transported by wheelchair to personal car with all belongings.

## 2023-08-29 NOTE — CARE COORDINATION
[facility]    DC order noted, all docs needed have been faxed to [facility] for home care services.     Home care to see patient within 24-48 hrs    Jose Leonard RN, BSN CTN  [facility] [phone]

## 2023-08-30 ENCOUNTER — FOLLOWUP TELEPHONE ENCOUNTER (OUTPATIENT)
Dept: TELEMETRY | Age: 88
End: 2023-08-30

## 2023-08-30 NOTE — TELEPHONE ENCOUNTER
Care Transitions Initial Follow Up Call    Call within 2 business days of discharge: Yes     Patient: Madhu Mendzoa Patient : 1930 MRN: [id number]    [unfilled]    RARS: Readmission Risk Score: 12.9       Spoke with: patient     Discharge department/facility: home    Non-face-to-face services provided:  Scheduled appointment with PCP-23 with cardiology    Attempted hospital follow up call. Pt answered but did not appear to understand or be able to hear this writer. Multiple times writer asked if he could hear or understand me and he kept repeating \"hello\"      Follow Up  No future appointments.     You Barber RN

## 2023-09-04 NOTE — PROGRESS NOTES
Physician Progress Note      Emelyn Katz  CSN #:                  [id number]  :                       1930  ADMIT DATE:       2023 7:41 PM  [facility] DATE:        2023 2:15 PM  RESPONDING  PROVIDER #:        Milagros Hernandez MD          QUERY TEXT:    Pt admitted with acute CHF. Noted documentation of acute respiratory failure   in ED consult note. If possible, please document in progress notes and   discharge summary:      The medical record reflects the following:  Risk Factors: Acute CHF, CKD, demand ischemia, obesity  Clinical Indicators: RR up to 25, 91% on 5L, supplemental oxygen up to 7L high   flow, pulse up to 116. Per nursing note  \"Pt has audible crackles and is   visibly short of breath. Morning dos of IV lasix given at this time. Pt's   oxygen requirements increased from 3L to 5L\". Per progress note \"Acute hypoxia   secondary to decompensated CHF\". Treatment: IV lasix, high flow oxygen, serial labs, CXR, supportive care    Thank you,  Catalino Constantino RN BSN  Options provided:  -- Acute respiratory failure confirmed present on admission  -- Acute respiratory failure ruled out  -- Other - I will add my own diagnosis  -- Disagree - Not applicable / Not valid  -- Disagree - Clinically unable to determine / Unknown  -- Refer to Clinical Documentation Reviewer    PROVIDER RESPONSE TEXT:    The diagnosis of acute respiratory failure was confirmed as present on   admission.     Query created by: Catalino Constantino on 2023 9:27 AM      Electronically signed by:  Milagros Hernandez MD 2023 10:25 AM

## 2023-09-07 ENCOUNTER — HOSPITAL ENCOUNTER (EMERGENCY)
Age: 88
Discharge: HOME | End: 2023-09-07
Payer: MEDICARE

## 2023-09-07 VITALS
HEART RATE: 75 BPM | TEMPERATURE: 98.3 F | HEIGHT: 61 IN | SYSTOLIC BLOOD PRESSURE: 128 MMHG | OXYGEN SATURATION: 98 % | RESPIRATION RATE: 16 BRPM | WEIGHT: 165 LBS | DIASTOLIC BLOOD PRESSURE: 60 MMHG | BODY MASS INDEX: 31.15 KG/M2

## 2023-09-07 DIAGNOSIS — R30.0 DYSURIA: Primary | ICD-10-CM

## 2023-09-07 LAB
ALBUMIN SERPL-MCNC: 3.5 G/DL (ref 3.4–5)
ALBUMIN/GLOB SERPL: 0.9 {RATIO} (ref 1.1–2.2)
ALP SERPL-CCNC: 75 U/L (ref 40–129)
ALT SERPL-CCNC: 12 U/L (ref 10–40)
ANION GAP SERPL CALCULATED.3IONS-SCNC: 9 MMOL/L (ref 3–16)
AST SERPL-CCNC: 19 U/L (ref 15–37)
BASOPHILS # BLD: 0 K/UL (ref 0–0.2)
BASOPHILS NFR BLD: 0.5 %
BILIRUB SERPL-MCNC: 0.3 MG/DL (ref 0–1)
BILIRUB UR QL STRIP.AUTO: NEGATIVE
BUN SERPL-MCNC: 41 MG/DL (ref 7–20)
CALCIUM SERPL-MCNC: 9.4 MG/DL (ref 8.3–10.6)
CHLORIDE SERPL-SCNC: 100 MMOL/L (ref 99–110)
CLARITY UR: CLEAR
CO2 SERPL-SCNC: 27 MMOL/L (ref 21–32)
COLOR UR: NORMAL
CREAT SERPL-MCNC: 1.6 MG/DL (ref 0.6–1.2)
DEPRECATED RDW RBC AUTO: 14.2 % (ref 12.4–15.4)
EOSINOPHIL # BLD: 0.1 K/UL (ref 0–0.6)
EOSINOPHIL NFR BLD: 0.9 %
GFR SERPLBLD CREATININE-BSD FMLA CKD-EPI: 30 ML/MIN/{1.73_M2}
GLUCOSE SERPL-MCNC: 101 MG/DL (ref 70–99)
GLUCOSE UR STRIP.AUTO-MCNC: NEGATIVE MG/DL
HCT VFR BLD AUTO: 34.8 % (ref 36–48)
HGB BLD-MCNC: 11.8 G/DL (ref 12–16)
HGB UR QL STRIP.AUTO: NEGATIVE
KETONES UR STRIP.AUTO-MCNC: NEGATIVE MG/DL
LEUKOCYTE ESTERASE UR QL STRIP.AUTO: NEGATIVE
LYMPHOCYTES # BLD: 2.5 K/UL (ref 1–5.1)
LYMPHOCYTES NFR BLD: 34.1 %
MCH RBC QN AUTO: 31.6 PG (ref 26–34)
MCHC RBC AUTO-ENTMCNC: 33.8 G/DL (ref 31–36)
MCV RBC AUTO: 93.3 FL (ref 80–100)
MONOCYTES # BLD: 0.9 K/UL (ref 0–1.3)
MONOCYTES NFR BLD: 12.3 %
NEUTROPHILS # BLD: 3.8 K/UL (ref 1.7–7.7)
NEUTROPHILS NFR BLD: 52.2 %
NITRITE UR QL STRIP.AUTO: NEGATIVE
PH UR STRIP.AUTO: 6 [PH] (ref 5–8)
PLATELET # BLD AUTO: 150 K/UL (ref 135–450)
PMV BLD AUTO: 9.7 FL (ref 5–10.5)
POTASSIUM SERPL-SCNC: 3.9 MMOL/L (ref 3.5–5.1)
PROT SERPL-MCNC: 7.3 G/DL (ref 6.4–8.2)
PROT UR STRIP.AUTO-MCNC: NEGATIVE MG/DL
RBC # BLD AUTO: 3.73 M/UL (ref 4–5.2)
SODIUM SERPL-SCNC: 136 MMOL/L (ref 136–145)
SP GR UR STRIP.AUTO: 1.01 (ref 1–1.03)
UA DIPSTICK W REFLEX MICRO PNL UR: NORMAL
URN SPEC COLLECT METH UR: NORMAL
UROBILINOGEN UR STRIP-ACNC: 0.2 E.U./DL
WBC # BLD AUTO: 7.2 K/UL (ref 4–11)

## 2023-09-07 PROCEDURE — 51798 US URINE CAPACITY MEASURE: CPT

## 2023-09-07 PROCEDURE — 51702 INSERT TEMP BLADDER CATH: CPT

## 2023-09-07 PROCEDURE — 85025 COMPLETE CBC W/AUTO DIFF WBC: CPT

## 2023-09-07 PROCEDURE — 99283 EMERGENCY DEPT VISIT LOW MDM: CPT

## 2023-09-07 PROCEDURE — 81003 URINALYSIS AUTO W/O SCOPE: CPT

## 2023-09-07 PROCEDURE — 80053 COMPREHEN METABOLIC PANEL: CPT

## 2023-09-07 ASSESSMENT — PAIN - FUNCTIONAL ASSESSMENT
PAIN_FUNCTIONAL_ASSESSMENT: NONE - DENIES PAIN
PAIN_FUNCTIONAL_ASSESSMENT: 0-10

## 2023-09-07 ASSESSMENT — PAIN SCALES - GENERAL: PAINLEVEL_OUTOF10: 2

## 2023-09-07 NOTE — ED NOTES
Pt bladder scanned 169ml urine in bladder. Muñoz Catheter to be placed.      Rachael Szymanski LPN  32/52/60 [phone]

## 2023-09-07 NOTE — ED NOTES
Pt instructed to follow up with PCP. Assessed per Raul Cline NP.      Holden Pickens, ATIF  61/40/58 [phone]

## 2023-09-08 ENCOUNTER — TELEPHONE (OUTPATIENT)
Dept: OTHER | Facility: CLINIC | Age: 88
End: 2023-09-08

## 2023-09-08 NOTE — TELEPHONE ENCOUNTER
Writer spoke with pt and offered to assist with scheduling follow up appt. Pt declined assistance stating that her daughter has already called the dr offices.

## 2023-09-09 NOTE — ED PROVIDER NOTES
[address] [place] [address]  ED  EMERGENCY DEPARTMENT ENCOUNTER        Pt Name: Karolyn Montemayor  MRN: [id number]  [facility] 8/25/1930  Date of evaluation: 9/7/2023  Provider: ROBERT Willoughby - CNP  PCP: Liliana Mortimer, MD        NESTOR. I have evaluated this patient. CHIEF COMPLAINT       Chief Complaint   Patient presents with    Urinary Retention     Per pt I was here last week for the issues of not being able to urinate/still having problem/just dribble when I urinate       HISTORY OF PRESENT ILLNESS: 1 or more Elements     History From: the patient  Limitations to history : None    Karolyn Montemayor is a 80 y.o. female who presents to the retention. Patient was here last week for some issues, states that she has just been dribbling when she urinates today, states that she feels like he is not getting all of her urine out. Patient denies any other complaints, no severe pain, no chest pain or difficulty breathing no fevers. She is here for further evaluation. Family is at bedside. Nursing Notes were all reviewed and agreed with or any disagreements were addressed in the HPI. REVIEW OF SYSTEMS :      Review of Systems    Positives and Pertinent negatives as per HPI.      SURGICAL HISTORY     Past Surgical History:   Procedure Laterality Date    APPENDECTOMY      BREAST SURGERY      biopsy benign    CARDIAC SURGERY  2007    stent    CATARACT REMOVAL WITH IMPLANT Right 10/11/2017    COLONOSCOPY  4/29/2013    HYSTERECTOMY (CERVIX STATUS UNKNOWN)      JOINT REPLACEMENT Left 8/13/14    LEFT TOTAL KNEE REPLACEMENT WITH FEMORAL NERVE BLOCK FOR PAIN    OTHER SURGICAL HISTORY Right 1/6/14    right total knee replacement    TONSILLECTOMY         CURRENTMEDICATIONS       Discharge Medication List as of 9/7/2023  6:42 PM        CONTINUE these medications which have NOT CHANGED    Details   metoprolol succinate (TOPROL XL) 25 MG extended release tablet Take 1 tablet by mouth daily, Disp-30 tablet,

## 2024-01-16 ENCOUNTER — APPOINTMENT (OUTPATIENT)
Dept: GENERAL RADIOLOGY | Age: 89
DRG: 543 | End: 2024-01-16
Payer: MEDICARE

## 2024-01-16 ENCOUNTER — APPOINTMENT (OUTPATIENT)
Dept: MRI IMAGING | Age: 89
DRG: 543 | End: 2024-01-16
Payer: MEDICARE

## 2024-01-16 ENCOUNTER — HOSPITAL ENCOUNTER (INPATIENT)
Age: 89
LOS: 5 days | Discharge: NURSING, REHAB OR LONG-TERM CARE | DRG: 543 | End: 2024-01-21
Attending: STUDENT IN AN ORGANIZED HEALTH CARE EDUCATION/TRAINING PROGRAM | Admitting: STUDENT IN AN ORGANIZED HEALTH CARE EDUCATION/TRAINING PROGRAM
Payer: MEDICARE

## 2024-01-16 ENCOUNTER — APPOINTMENT (OUTPATIENT)
Dept: CT IMAGING | Age: 89
DRG: 543 | End: 2024-01-16
Payer: MEDICARE

## 2024-01-16 DIAGNOSIS — R26.2 UNABLE TO AMBULATE: ICD-10-CM
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
## 2024-01-16 DIAGNOSIS — S32.010A COMPRESSION FRACTURE OF L1 VERTEBRA, INITIAL ENCOUNTER (HCC): ICD-10-CM

## 2024-01-16 DIAGNOSIS — S32.010A CLOSED COMPRESSION FRACTURE OF BODY OF L1 VERTEBRA (HCC): ICD-10-CM

## 2024-01-16 LAB
ALBUMIN SERPL-MCNC: 4.1 G/DL (ref 3.4–5)
ALBUMIN/GLOB SERPL: 0.9 {RATIO} (ref 1.1–2.2)
ALP SERPL-CCNC: 80 U/L (ref 40–129)
ALT SERPL-CCNC: 10 U/L (ref 10–40)
ANION GAP SERPL CALCULATED.3IONS-SCNC: 11 MMOL/L (ref 3–16)
APTT BLD: 31.7 SEC (ref 22.7–35.9)
AST SERPL-CCNC: 19 U/L (ref 15–37)
BACTERIA URNS QL MICRO: ABNORMAL /HPF
BASOPHILS # BLD: 0 K/UL (ref 0–0.2)
BASOPHILS NFR BLD: 0.4 %
BILIRUB SERPL-MCNC: 0.5 MG/DL (ref 0–1)
BILIRUB UR QL STRIP.AUTO: NEGATIVE
BUN SERPL-MCNC: 21 MG/DL (ref 7–20)
CALCIUM SERPL-MCNC: 10.1 MG/DL (ref 8.3–10.6)
CHLORIDE SERPL-SCNC: 95 MMOL/L (ref 99–110)
CK SERPL-CCNC: 34 U/L (ref 26–192)
CLARITY UR: CLEAR
CO2 SERPL-SCNC: 27 MMOL/L (ref 21–32)
COLOR UR: YELLOW
CREAT SERPL-MCNC: 1 MG/DL (ref 0.6–1.2)
DEPRECATED RDW RBC AUTO: 15.6 % (ref 12.4–15.4)
EOSINOPHIL # BLD: 0.1 K/UL (ref 0–0.6)
EOSINOPHIL NFR BLD: 0.9 %
EPI CELLS #/AREA URNS HPF: ABNORMAL /HPF (ref 0–5)
GFR SERPLBLD CREATININE-BSD FMLA CKD-EPI: 52 ML/MIN/{1.73_M2}
GLUCOSE SERPL-MCNC: 108 MG/DL (ref 70–99)
GLUCOSE UR STRIP.AUTO-MCNC: NEGATIVE MG/DL
HCT VFR BLD AUTO: 37.6 % (ref 36–48)
HGB BLD-MCNC: 12.4 G/DL (ref 12–16)
HGB UR QL STRIP.AUTO: ABNORMAL
INR PPP: 1.25 (ref 0.84–1.16)
KETONES UR STRIP.AUTO-MCNC: NEGATIVE MG/DL
LEUKOCYTE ESTERASE UR QL STRIP.AUTO: ABNORMAL
LYMPHOCYTES # BLD: 1.1 K/UL (ref 1–5.1)
LYMPHOCYTES NFR BLD: 15.1 %
MCH RBC QN AUTO: 30.4 PG (ref 26–34)
MCHC RBC AUTO-ENTMCNC: 32.9 G/DL (ref 31–36)
MCV RBC AUTO: 92.6 FL (ref 80–100)
MONOCYTES # BLD: 0.6 K/UL (ref 0–1.3)
MONOCYTES NFR BLD: 8.2 %
NEUTROPHILS # BLD: 5.6 K/UL (ref 1.7–7.7)
NEUTROPHILS NFR BLD: 75.4 %
NITRITE UR QL STRIP.AUTO: NEGATIVE
PH UR STRIP.AUTO: 6 [PH] (ref 5–8)
PLATELET # BLD AUTO: 165 K/UL (ref 135–450)
PMV BLD AUTO: 9.1 FL (ref 5–10.5)
POTASSIUM SERPL-SCNC: 3.6 MMOL/L (ref 3.5–5.1)
PROT SERPL-MCNC: 8.6 G/DL (ref 6.4–8.2)
PROT UR STRIP.AUTO-MCNC: NEGATIVE MG/DL
PROTHROMBIN TIME: 15.7 SEC (ref 11.5–14.8)
RBC # BLD AUTO: 4.06 M/UL (ref 4–5.2)
RBC #/AREA URNS HPF: ABNORMAL /HPF (ref 0–4)
SODIUM SERPL-SCNC: 133 MMOL/L (ref 136–145)
SP GR UR STRIP.AUTO: 1.01 (ref 1–1.03)
UA COMPLETE W REFLEX CULTURE PNL UR: ABNORMAL
UA DIPSTICK W REFLEX MICRO PNL UR: YES
URN SPEC COLLECT METH UR: ABNORMAL
UROBILINOGEN UR STRIP-ACNC: 0.2 E.U./DL
WBC # BLD AUTO: 7.4 K/UL (ref 4–11)
WBC #/AREA URNS HPF: ABNORMAL /HPF (ref 0–5)

## 2024-01-16 PROCEDURE — 2580000003 HC RX 258: Performed by: STUDENT IN AN ORGANIZED HEALTH CARE EDUCATION/TRAINING PROGRAM

## 2024-01-16 PROCEDURE — 73521 X-RAY EXAM HIPS BI 2 VIEWS: CPT

## 2024-01-16 PROCEDURE — 71045 X-RAY EXAM CHEST 1 VIEW: CPT

## 2024-01-16 PROCEDURE — 70450 CT HEAD/BRAIN W/O DYE: CPT

## 2024-01-16 PROCEDURE — 94761 N-INVAS EAR/PLS OXIMETRY MLT: CPT

## 2024-01-16 PROCEDURE — 2700000000 HC OXYGEN THERAPY PER DAY

## 2024-01-16 PROCEDURE — 72131 CT LUMBAR SPINE W/O DYE: CPT

## 2024-01-16 PROCEDURE — 72128 CT CHEST SPINE W/O DYE: CPT

## 2024-01-16 PROCEDURE — 80053 COMPREHEN METABOLIC PANEL: CPT

## 2024-01-16 PROCEDURE — 85610 PROTHROMBIN TIME: CPT

## 2024-01-16 PROCEDURE — 6370000000 HC RX 637 (ALT 250 FOR IP): Performed by: STUDENT IN AN ORGANIZED HEALTH CARE EDUCATION/TRAINING PROGRAM

## 2024-01-16 PROCEDURE — 81001 URINALYSIS AUTO W/SCOPE: CPT

## 2024-01-16 PROCEDURE — 73560 X-RAY EXAM OF KNEE 1 OR 2: CPT

## 2024-01-16 PROCEDURE — 85730 THROMBOPLASTIN TIME PARTIAL: CPT

## 2024-01-16 PROCEDURE — 1200000000 HC SEMI PRIVATE

## 2024-01-16 PROCEDURE — 72125 CT NECK SPINE W/O DYE: CPT

## 2024-01-16 PROCEDURE — 99285 EMERGENCY DEPT VISIT HI MDM: CPT

## 2024-01-16 PROCEDURE — 82550 ASSAY OF CK (CPK): CPT

## 2024-01-16 PROCEDURE — 85025 COMPLETE CBC W/AUTO DIFF WBC: CPT

## 2024-01-16 RX ORDER — ONDANSETRON 2 MG/ML
4 INJECTION INTRAMUSCULAR; INTRAVENOUS EVERY 6 HOURS PRN
Status: DISCONTINUED | OUTPATIENT
Start: 2024-01-16 | End: 2024-01-21 | Stop reason: HOSPADM

## 2024-01-16 RX ORDER — ACETAMINOPHEN 650 MG/1
650 SUPPOSITORY RECTAL EVERY 6 HOURS PRN
Status: DISCONTINUED | OUTPATIENT
Start: 2024-01-16 | End: 2024-01-21 | Stop reason: HOSPADM

## 2024-01-16 RX ORDER — METHOCARBAMOL 500 MG/1
500 TABLET, FILM COATED ORAL 3 TIMES DAILY
Status: DISCONTINUED | OUTPATIENT
Start: 2024-01-16 | End: 2024-01-18

## 2024-01-16 RX ORDER — TRAMADOL HYDROCHLORIDE 50 MG/1
50 TABLET ORAL ONCE
Status: COMPLETED | OUTPATIENT
Start: 2024-01-16 | End: 2024-01-16

## 2024-01-16 RX ORDER — ALLOPURINOL 100 MG/1
50 TABLET ORAL DAILY
Status: DISCONTINUED | OUTPATIENT
Start: 2024-01-16 | End: 2024-01-21 | Stop reason: HOSPADM

## 2024-01-16 RX ORDER — TRAMADOL HYDROCHLORIDE 50 MG/1
12.5 TABLET ORAL EVERY 6 HOURS PRN
Status: DISCONTINUED | OUTPATIENT
Start: 2024-01-16 | End: 2024-01-21 | Stop reason: HOSPADM

## 2024-01-16 RX ORDER — LEVOTHYROXINE SODIUM 0.1 MG/1
100 TABLET ORAL DAILY
Status: DISCONTINUED | OUTPATIENT
Start: 2024-01-16 | End: 2024-01-21 | Stop reason: HOSPADM

## 2024-01-16 RX ORDER — ACETAMINOPHEN 325 MG/1
650 TABLET ORAL EVERY 6 HOURS PRN
Status: DISCONTINUED | OUTPATIENT
Start: 2024-01-16 | End: 2024-01-21 | Stop reason: HOSPADM

## 2024-01-16 RX ORDER — 0.9 % SODIUM CHLORIDE 0.9 %
1000 INTRAVENOUS SOLUTION INTRAVENOUS ONCE
Status: COMPLETED | OUTPATIENT
Start: 2024-01-16 | End: 2024-01-16

## 2024-01-16 RX ORDER — POLYETHYLENE GLYCOL 3350 17 G/17G
17 POWDER, FOR SOLUTION ORAL DAILY PRN
Status: DISCONTINUED | OUTPATIENT
Start: 2024-01-16 | End: 2024-01-21 | Stop reason: HOSPADM

## 2024-01-16 RX ORDER — CYCLOSPORINE 0.5 MG/ML
1 EMULSION OPHTHALMIC 2 TIMES DAILY
Status: DISCONTINUED | OUTPATIENT
Start: 2024-01-16 | End: 2024-01-21 | Stop reason: HOSPADM

## 2024-01-16 RX ORDER — SODIUM CHLORIDE 0.9 % (FLUSH) 0.9 %
5-40 SYRINGE (ML) INJECTION PRN
Status: DISCONTINUED | OUTPATIENT
Start: 2024-01-16 | End: 2024-01-21 | Stop reason: HOSPADM

## 2024-01-16 RX ORDER — SODIUM CHLORIDE 9 MG/ML
INJECTION, SOLUTION INTRAVENOUS PRN
Status: DISCONTINUED | OUTPATIENT
Start: 2024-01-16 | End: 2024-01-21 | Stop reason: HOSPADM

## 2024-01-16 RX ORDER — TRAMADOL HYDROCHLORIDE 50 MG/1
25 TABLET ORAL EVERY 6 HOURS PRN
Status: DISCONTINUED | OUTPATIENT
Start: 2024-01-16 | End: 2024-01-21 | Stop reason: HOSPADM

## 2024-01-16 RX ORDER — ATORVASTATIN CALCIUM 10 MG/1
10 TABLET, FILM COATED ORAL DAILY
Status: DISCONTINUED | OUTPATIENT
Start: 2024-01-16 | End: 2024-01-21 | Stop reason: HOSPADM

## 2024-01-16 RX ORDER — ACETAMINOPHEN 325 MG/1
650 TABLET ORAL 3 TIMES DAILY
Status: DISCONTINUED | OUTPATIENT
Start: 2024-01-16 | End: 2024-01-21 | Stop reason: HOSPADM

## 2024-01-16 RX ORDER — SODIUM CHLORIDE 0.9 % (FLUSH) 0.9 %
5-40 SYRINGE (ML) INJECTION EVERY 12 HOURS SCHEDULED
Status: DISCONTINUED | OUTPATIENT
Start: 2024-01-16 | End: 2024-01-21 | Stop reason: HOSPADM

## 2024-01-16 RX ORDER — ONDANSETRON 4 MG/1
4 TABLET, ORALLY DISINTEGRATING ORAL EVERY 8 HOURS PRN
Status: DISCONTINUED | OUTPATIENT
Start: 2024-01-16 | End: 2024-01-21 | Stop reason: HOSPADM

## 2024-01-16 RX ORDER — METOPROLOL SUCCINATE 25 MG/1
25 TABLET, EXTENDED RELEASE ORAL DAILY
Status: DISCONTINUED | OUTPATIENT
Start: 2024-01-16 | End: 2024-01-21 | Stop reason: HOSPADM

## 2024-01-16 RX ORDER — TORSEMIDE 20 MG/1
20 TABLET ORAL DAILY
Status: DISCONTINUED | OUTPATIENT
Start: 2024-01-16 | End: 2024-01-21 | Stop reason: HOSPADM

## 2024-01-16 RX ORDER — TAMSULOSIN HYDROCHLORIDE 0.4 MG/1
0.4 CAPSULE ORAL DAILY
Status: DISCONTINUED | OUTPATIENT
Start: 2024-01-16 | End: 2024-01-21 | Stop reason: HOSPADM

## 2024-01-16 RX ADMIN — TRAMADOL HYDROCHLORIDE 50 MG: 50 TABLET ORAL at 09:04

## 2024-01-16 RX ADMIN — APIXABAN 2.5 MG: 2.5 TABLET, FILM COATED ORAL at 20:58

## 2024-01-16 RX ADMIN — SODIUM CHLORIDE 1000 ML: 9 INJECTION, SOLUTION INTRAVENOUS at 09:04

## 2024-01-16 RX ADMIN — METHOCARBAMOL 500 MG: 500 TABLET ORAL at 20:58

## 2024-01-16 RX ADMIN — CYCLOSPORINE 1 DROP: 0.5 EMULSION OPHTHALMIC at 21:11

## 2024-01-16 RX ADMIN — SODIUM CHLORIDE, PRESERVATIVE FREE 10 ML: 5 INJECTION INTRAVENOUS at 21:03

## 2024-01-16 RX ADMIN — ACETAMINOPHEN 650 MG: 325 TABLET, FILM COATED ORAL at 19:01

## 2024-01-16 ASSESSMENT — PAIN DESCRIPTION - LOCATION
LOCATION: BACK
LOCATION: BACK

## 2024-01-16 ASSESSMENT — PAIN SCALES - GENERAL
PAINLEVEL_OUTOF10: 3
PAINLEVEL_OUTOF10: 9

## 2024-01-16 NOTE — ED PROVIDER NOTES
Psychiatric:         Mood and Affect: Mood normal.             MDM/ED Course  ED Medication Orders (From admission, onward)      Start Ordered     Status Ordering Provider    01/16/24 0900 01/16/24 0848  sodium chloride 0.9 % bolus 1,000 mL  ONCE         Last MAR action: New Bag - by ZACHERY MUELLER on 01/16/24 at 0904 DENTON RIVERA    01/16/24 0900 01/16/24 0857  traMADol (ULTRAM) tablet 50 mg  ONCE         Last MAR action: Given - by ZACHERY MUELLER on 01/16/24 at 0904 DENTON RIVERA            Radiology  CT LUMBAR SPINE WO CONTRAST    Result Date: 1/16/2024  EXAMINATION: CT OF THE LUMBAR SPINE WITHOUT CONTRAST  1/16/2024 TECHNIQUE: CT of the lumbar spine was performed without the administration of intravenous contrast. Multiplanar reformatted images are provided for review. Adjustment of mA and/or kV according to patient size was utilized.  Automated exposure control, iterative reconstruction, and/or weight based adjustment of the mA/kV was utilized to reduce the radiation dose to as low as reasonably achievable. COMPARISON: 08/05/2018 HISTORY: ORDERING SYSTEM PROVIDED HISTORY: fall with back pain TECHNOLOGIST PROVIDED HISTORY: Reason for exam:->fall with back pain Decision Support Exception - unselect if not a suspected or confirmed emergency medical condition->Emergency Medical Condition (MA) Reason for Exam: slipped out of recliner this am, denies hitting head, c/o back pain FINDINGS: BONES/ALIGNMENT: The bones are demineralized.  There is grade 1 anterolisthesis of L4 on L5. However, there are new age-indeterminate compression fractures involving the superior endplates of L1 with approximately 10% loss of height as well as an age-indeterminate burst fracture involving the L2 vertebral body with approximately 50% loss of height.  There is minimal retropulsion of bony fragments into the spinal canal at L4-3. DEGENERATIVE CHANGES: There is mild multilevel spondylosis and moderate facet arthropathy.

## 2024-01-17 ENCOUNTER — APPOINTMENT (OUTPATIENT)
Dept: MRI IMAGING | Age: 89
DRG: 543 | End: 2024-01-17
Payer: MEDICARE

## 2024-01-17 LAB
ANION GAP SERPL CALCULATED.3IONS-SCNC: 9 MMOL/L (ref 3–16)
BASOPHILS # BLD: 0 K/UL (ref 0–0.2)
BASOPHILS NFR BLD: 0.4 %
BUN SERPL-MCNC: 19 MG/DL (ref 7–20)
CALCIUM SERPL-MCNC: 9.6 MG/DL (ref 8.3–10.6)
CHLORIDE SERPL-SCNC: 101 MMOL/L (ref 99–110)
CO2 SERPL-SCNC: 26 MMOL/L (ref 21–32)
CREAT SERPL-MCNC: 0.9 MG/DL (ref 0.6–1.2)
DEPRECATED RDW RBC AUTO: 15.6 % (ref 12.4–15.4)
EOSINOPHIL # BLD: 0.1 K/UL (ref 0–0.6)
EOSINOPHIL NFR BLD: 1.2 %
GFR SERPLBLD CREATININE-BSD FMLA CKD-EPI: 59 ML/MIN/{1.73_M2}
GLUCOSE SERPL-MCNC: 86 MG/DL (ref 70–99)
HCT VFR BLD AUTO: 35.3 % (ref 36–48)
HGB BLD-MCNC: 11.6 G/DL (ref 12–16)
LYMPHOCYTES # BLD: 1.6 K/UL (ref 1–5.1)
LYMPHOCYTES NFR BLD: 27.8 %
MCH RBC QN AUTO: 30.8 PG (ref 26–34)
MCHC RBC AUTO-ENTMCNC: 32.9 G/DL (ref 31–36)
MCV RBC AUTO: 93.7 FL (ref 80–100)
MONOCYTES # BLD: 0.6 K/UL (ref 0–1.3)
MONOCYTES NFR BLD: 10.8 %
NEUTROPHILS # BLD: 3.4 K/UL (ref 1.7–7.7)
NEUTROPHILS NFR BLD: 59.8 %
PLATELET # BLD AUTO: 144 K/UL (ref 135–450)
PMV BLD AUTO: 9.5 FL (ref 5–10.5)
POTASSIUM SERPL-SCNC: 4 MMOL/L (ref 3.5–5.1)
RBC # BLD AUTO: 3.77 M/UL (ref 4–5.2)
SODIUM SERPL-SCNC: 136 MMOL/L (ref 136–145)
WBC # BLD AUTO: 5.7 K/UL (ref 4–11)

## 2024-01-17 PROCEDURE — 94761 N-INVAS EAR/PLS OXIMETRY MLT: CPT

## 2024-01-17 PROCEDURE — 2580000003 HC RX 258: Performed by: STUDENT IN AN ORGANIZED HEALTH CARE EDUCATION/TRAINING PROGRAM

## 2024-01-17 PROCEDURE — 1200000000 HC SEMI PRIVATE

## 2024-01-17 PROCEDURE — 72148 MRI LUMBAR SPINE W/O DYE: CPT

## 2024-01-17 PROCEDURE — 36415 COLL VENOUS BLD VENIPUNCTURE: CPT

## 2024-01-17 PROCEDURE — 80048 BASIC METABOLIC PNL TOTAL CA: CPT

## 2024-01-17 PROCEDURE — 85025 COMPLETE CBC W/AUTO DIFF WBC: CPT

## 2024-01-17 PROCEDURE — 6370000000 HC RX 637 (ALT 250 FOR IP): Performed by: STUDENT IN AN ORGANIZED HEALTH CARE EDUCATION/TRAINING PROGRAM

## 2024-01-17 PROCEDURE — 2700000000 HC OXYGEN THERAPY PER DAY

## 2024-01-17 RX ORDER — LORAZEPAM 1 MG/1
1 TABLET ORAL
Status: DISCONTINUED | OUTPATIENT
Start: 2024-01-17 | End: 2024-01-17

## 2024-01-17 RX ORDER — SENNA AND DOCUSATE SODIUM 50; 8.6 MG/1; MG/1
2 TABLET, FILM COATED ORAL DAILY
Status: DISCONTINUED | OUTPATIENT
Start: 2024-01-17 | End: 2024-01-21 | Stop reason: HOSPADM

## 2024-01-17 RX ORDER — LORAZEPAM 1 MG/1
2 TABLET ORAL
Status: DISCONTINUED | OUTPATIENT
Start: 2024-01-17 | End: 2024-01-17

## 2024-01-17 RX ORDER — LORAZEPAM 1 MG/1
1 TABLET ORAL
Status: COMPLETED | OUTPATIENT
Start: 2024-01-17 | End: 2024-01-17

## 2024-01-17 RX ADMIN — CYCLOSPORINE 1 DROP: 0.5 EMULSION OPHTHALMIC at 21:02

## 2024-01-17 RX ADMIN — ATORVASTATIN CALCIUM 10 MG: 10 TABLET, FILM COATED ORAL at 10:08

## 2024-01-17 RX ADMIN — SODIUM CHLORIDE, PRESERVATIVE FREE 10 ML: 5 INJECTION INTRAVENOUS at 21:00

## 2024-01-17 RX ADMIN — CYCLOSPORINE 1 DROP: 0.5 EMULSION OPHTHALMIC at 08:10

## 2024-01-17 RX ADMIN — LORAZEPAM 1 MG: 1 TABLET ORAL at 13:58

## 2024-01-17 RX ADMIN — SODIUM CHLORIDE, PRESERVATIVE FREE 10 ML: 5 INJECTION INTRAVENOUS at 10:29

## 2024-01-17 RX ADMIN — ACETAMINOPHEN 650 MG: 325 TABLET, FILM COATED ORAL at 13:59

## 2024-01-17 RX ADMIN — LEVOTHYROXINE SODIUM 100 MCG: 0.1 TABLET ORAL at 06:09

## 2024-01-17 RX ADMIN — METHOCARBAMOL 500 MG: 500 TABLET ORAL at 20:58

## 2024-01-17 RX ADMIN — ALLOPURINOL 50 MG: 100 TABLET ORAL at 10:08

## 2024-01-17 RX ADMIN — APIXABAN 2.5 MG: 2.5 TABLET, FILM COATED ORAL at 10:08

## 2024-01-17 RX ADMIN — TRAMADOL HYDROCHLORIDE 12.5 MG: 50 TABLET ORAL at 06:09

## 2024-01-17 RX ADMIN — ACETAMINOPHEN 650 MG: 325 TABLET, FILM COATED ORAL at 10:08

## 2024-01-17 RX ADMIN — ACETAMINOPHEN 650 MG: 325 TABLET, FILM COATED ORAL at 20:58

## 2024-01-17 RX ADMIN — TORSEMIDE 20 MG: 20 TABLET ORAL at 10:08

## 2024-01-17 RX ADMIN — DOCUSATE SODIUM AND SENNOSIDES 2 TABLET: 8.6; 5 TABLET, FILM COATED ORAL at 10:08

## 2024-01-17 RX ADMIN — APIXABAN 2.5 MG: 2.5 TABLET, FILM COATED ORAL at 20:58

## 2024-01-17 RX ADMIN — METOPROLOL SUCCINATE 25 MG: 25 TABLET, EXTENDED RELEASE ORAL at 10:08

## 2024-01-17 RX ADMIN — TAMSULOSIN HYDROCHLORIDE 0.4 MG: 0.4 CAPSULE ORAL at 10:08

## 2024-01-17 ASSESSMENT — PAIN DESCRIPTION - LOCATION: LOCATION: BACK

## 2024-01-17 ASSESSMENT — PAIN SCALES - GENERAL: PAINLEVEL_OUTOF10: 5

## 2024-01-17 ASSESSMENT — PAIN DESCRIPTION - ORIENTATION: ORIENTATION: LOWER

## 2024-01-17 ASSESSMENT — PAIN DESCRIPTION - DESCRIPTORS: DESCRIPTORS: ACHING;DISCOMFORT

## 2024-01-17 NOTE — PLAN OF CARE
Problem: Skin/Tissue Integrity  Goal: Absence of new skin breakdown  Description: 1.  Monitor for areas of redness and/or skin breakdown  2.  Assess vascular access sites hourly  3.  Every 4-6 hours minimum:  Change oxygen saturation probe site  4.  Every 4-6 hours:  If on nasal continuous positive airway pressure, respiratory therapy assess nares and determine need for appliance change or resting period.  Outcome: Progressing     Problem: Safety - Adult  Goal: Free from fall injury  Outcome: Progressing  Flowsheets (Taken 1/16/2024 2205)  Free From Fall Injury: Instruct family/caregiver on patient safety     Problem: ABCDS Injury Assessment  Goal: Absence of physical injury  Outcome: Progressing  Flowsheets (Taken 1/16/2024 2205)  Absence of Physical Injury: Implement safety measures based on patient assessment

## 2024-01-18 PROCEDURE — 97530 THERAPEUTIC ACTIVITIES: CPT

## 2024-01-18 PROCEDURE — 97535 SELF CARE MNGMENT TRAINING: CPT

## 2024-01-18 PROCEDURE — 2580000003 HC RX 258: Performed by: STUDENT IN AN ORGANIZED HEALTH CARE EDUCATION/TRAINING PROGRAM

## 2024-01-18 PROCEDURE — 6370000000 HC RX 637 (ALT 250 FOR IP): Performed by: STUDENT IN AN ORGANIZED HEALTH CARE EDUCATION/TRAINING PROGRAM

## 2024-01-18 PROCEDURE — 94761 N-INVAS EAR/PLS OXIMETRY MLT: CPT

## 2024-01-18 PROCEDURE — 1200000000 HC SEMI PRIVATE

## 2024-01-18 PROCEDURE — 2700000000 HC OXYGEN THERAPY PER DAY

## 2024-01-18 PROCEDURE — 97166 OT EVAL MOD COMPLEX 45 MIN: CPT

## 2024-01-18 PROCEDURE — 97162 PT EVAL MOD COMPLEX 30 MIN: CPT

## 2024-01-18 PROCEDURE — 6370000000 HC RX 637 (ALT 250 FOR IP): Performed by: INTERNAL MEDICINE

## 2024-01-18 RX ORDER — METHOCARBAMOL 500 MG/1
500 TABLET, FILM COATED ORAL 3 TIMES DAILY PRN
Status: DISCONTINUED | OUTPATIENT
Start: 2024-01-18 | End: 2024-01-21 | Stop reason: HOSPADM

## 2024-01-18 RX ORDER — ACETAMINOPHEN 325 MG/1
650 TABLET ORAL 3 TIMES DAILY
Status: DISCONTINUED | OUTPATIENT
Start: 2024-01-18 | End: 2024-01-18

## 2024-01-18 RX ADMIN — SODIUM CHLORIDE, PRESERVATIVE FREE 10 ML: 5 INJECTION INTRAVENOUS at 20:37

## 2024-01-18 RX ADMIN — CYCLOSPORINE 1 DROP: 0.5 EMULSION OPHTHALMIC at 10:12

## 2024-01-18 RX ADMIN — LEVOTHYROXINE SODIUM 100 MCG: 0.1 TABLET ORAL at 06:20

## 2024-01-18 RX ADMIN — ACETAMINOPHEN 650 MG: 325 TABLET, FILM COATED ORAL at 15:32

## 2024-01-18 RX ADMIN — TORSEMIDE 20 MG: 20 TABLET ORAL at 10:06

## 2024-01-18 RX ADMIN — METOPROLOL SUCCINATE 25 MG: 25 TABLET, EXTENDED RELEASE ORAL at 10:05

## 2024-01-18 RX ADMIN — ACETAMINOPHEN 650 MG: 325 TABLET, FILM COATED ORAL at 20:34

## 2024-01-18 RX ADMIN — ATORVASTATIN CALCIUM 10 MG: 10 TABLET, FILM COATED ORAL at 10:07

## 2024-01-18 RX ADMIN — APIXABAN 2.5 MG: 2.5 TABLET, FILM COATED ORAL at 10:06

## 2024-01-18 RX ADMIN — TAMSULOSIN HYDROCHLORIDE 0.4 MG: 0.4 CAPSULE ORAL at 10:06

## 2024-01-18 RX ADMIN — SODIUM CHLORIDE, PRESERVATIVE FREE 10 ML: 5 INJECTION INTRAVENOUS at 10:11

## 2024-01-18 RX ADMIN — CYCLOSPORINE 1 DROP: 0.5 EMULSION OPHTHALMIC at 20:38

## 2024-01-18 RX ADMIN — SALINE NASAL SPRAY 2 SPRAY: 1.5 SOLUTION NASAL at 15:36

## 2024-01-18 RX ADMIN — APIXABAN 2.5 MG: 2.5 TABLET, FILM COATED ORAL at 20:34

## 2024-01-18 RX ADMIN — ALLOPURINOL 50 MG: 100 TABLET ORAL at 10:07

## 2024-01-18 RX ADMIN — TRAMADOL HYDROCHLORIDE 25 MG: 50 TABLET, COATED ORAL at 15:30

## 2024-01-18 RX ADMIN — ACETAMINOPHEN 650 MG: 325 TABLET, FILM COATED ORAL at 10:09

## 2024-01-18 RX ADMIN — METHOCARBAMOL 500 MG: 500 TABLET ORAL at 10:07

## 2024-01-18 ASSESSMENT — PAIN SCALES - GENERAL
PAINLEVEL_OUTOF10: 9
PAINLEVEL_OUTOF10: 7

## 2024-01-18 ASSESSMENT — PAIN DESCRIPTION - DESCRIPTORS
DESCRIPTORS: ACHING;SHARP
DESCRIPTORS: ACHING;SHARP

## 2024-01-18 ASSESSMENT — PAIN DESCRIPTION - LOCATION
LOCATION: BACK
LOCATION: BACK

## 2024-01-18 ASSESSMENT — PAIN DESCRIPTION - ORIENTATION: ORIENTATION: LOWER

## 2024-01-18 NOTE — PLAN OF CARE
Problem: Skin/Tissue Integrity  Goal: Absence of new skin breakdown  Description: 1.  Monitor for areas of redness and/or skin breakdown  2.  Assess vascular access sites hourly  3.  Every 4-6 hours minimum:  Change oxygen saturation probe site  4.  Every 4-6 hours:  If on nasal continuous positive airway pressure, respiratory therapy assess nares and determine need for appliance change or resting period.  1/17/2024 2201 by Semaj Conway RN  Outcome: Progressing     Problem: Safety - Adult  Goal: Free from fall injury  1/17/2024 2201 by Semaj Conway RN  Outcome: Progressing     Problem: ABCDS Injury Assessment  Goal: Absence of physical injury  1/17/2024 2201 by Semaj Conway RN  Outcome: Progressing  Flowsheets (Taken 1/17/2024 2201)  Absence of Physical Injury: Implement safety measures based on patient assessment     Problem: Pain  Goal: Verbalizes/displays adequate comfort level or baseline comfort level  Outcome: Progressing  Flowsheets (Taken 1/17/2024 2201)  Verbalizes/displays adequate comfort level or baseline comfort level:   Encourage patient to monitor pain and request assistance   Assess pain using appropriate pain scale   Administer analgesics based on type and severity of pain and evaluate response   Implement non-pharmacological measures as appropriate and evaluate response

## 2024-01-18 NOTE — PLAN OF CARE
Paged regarding Kamala Carpenter. 89 year old with fall found to have L1 and L2 superior endplate fractures.     Recommend off the shelf TLSO brace to be worn whenever out of bed. If patient has persistent pain that limits ambulation in brace can consider IR consult for kyphoplasty.     Follow up in 6 weeks with upright xrays. Formal consult to follow in AM.     Juan Aguilar  Neurosurgeon  [facility]  [phone]  7:42 PM

## 2024-01-18 NOTE — CONSULTS
Consult Placed     Who: dianna  Date:1/17/24  Time:1705     Electronically signed by Lyndsey Santos on 1/16/2024 at 5:06 PM    
demonstrates:  CT cervical thoracic/lumbar - L1 SEP and L2 incomplete burst fractures, degenerative changes throughout    MRI demonstrates mild STIR signal in L1 and L2 vertebral bodies    IMPRESSION/RECOMMENDATIONS:    This is a 89 y.o. y.o. female with fall found to have L1 superior endplate fracture and L2 incomplete burst. Recommend TLSO brace to be worn whenever ambulatory. Recommend medical management of likely osteoporosis. UR in brace when able and follow up in 6 weeks.     Juan Aguilar  Neurosurgeon  [facility]  [phone]  7:44 AM

## 2024-01-19 ENCOUNTER — APPOINTMENT (OUTPATIENT)
Dept: GENERAL RADIOLOGY | Age: 89
DRG: 543 | End: 2024-01-19
Payer: MEDICARE

## 2024-01-19 LAB
ALBUMIN SERPL-MCNC: 3.4 G/DL (ref 3.4–5)
ANION GAP SERPL CALCULATED.3IONS-SCNC: 12 MMOL/L (ref 3–16)
BASE EXCESS BLDV CALC-SCNC: 3.4 MMOL/L (ref -3–3)
BASOPHILS # BLD: 0 K/UL (ref 0–0.2)
BASOPHILS NFR BLD: 0.5 %
BUN SERPL-MCNC: 21 MG/DL (ref 7–20)
CALCIUM SERPL-MCNC: 9.4 MG/DL (ref 8.3–10.6)
CHLORIDE SERPL-SCNC: 94 MMOL/L (ref 99–110)
CO2 BLDV-SCNC: 27 MMOL/L
CO2 SERPL-SCNC: 25 MMOL/L (ref 21–32)
COHGB MFR BLDV: 1.8 % (ref 0–1.5)
CREAT SERPL-MCNC: 1.2 MG/DL (ref 0.6–1.2)
DEPRECATED RDW RBC AUTO: 15.4 % (ref 12.4–15.4)
EOSINOPHIL # BLD: 0 K/UL (ref 0–0.6)
EOSINOPHIL NFR BLD: 0.3 %
GFR SERPLBLD CREATININE-BSD FMLA CKD-EPI: 42 ML/MIN/{1.73_M2}
GLUCOSE SERPL-MCNC: 148 MG/DL (ref 70–99)
HCO3 BLDV-SCNC: 26 MMOL/L (ref 23–29)
HCT VFR BLD AUTO: 34.8 % (ref 36–48)
HGB BLD-MCNC: 11.5 G/DL (ref 12–16)
LACTATE BLDV-SCNC: 1.2 MMOL/L (ref 0.4–2)
LYMPHOCYTES # BLD: 1 K/UL (ref 1–5.1)
LYMPHOCYTES NFR BLD: 11.2 %
MAGNESIUM SERPL-MCNC: 1.8 MG/DL (ref 1.8–2.4)
MCH RBC QN AUTO: 30.5 PG (ref 26–34)
MCHC RBC AUTO-ENTMCNC: 33 G/DL (ref 31–36)
MCV RBC AUTO: 92.5 FL (ref 80–100)
METHGB MFR BLDV: 0 %
MONOCYTES # BLD: 0.8 K/UL (ref 0–1.3)
MONOCYTES NFR BLD: 9.4 %
NEUTROPHILS # BLD: 6.8 K/UL (ref 1.7–7.7)
NEUTROPHILS NFR BLD: 78.6 %
O2 THERAPY: ABNORMAL
PCO2 BLDV: 33.2 MMHG (ref 40–50)
PH BLDV: 7.51 [PH] (ref 7.35–7.45)
PHOSPHATE SERPL-MCNC: 2.9 MG/DL (ref 2.5–4.9)
PLATELET # BLD AUTO: 169 K/UL (ref 135–450)
PMV BLD AUTO: 8.9 FL (ref 5–10.5)
PO2 BLDV: 72.3 MMHG (ref 25–40)
POTASSIUM SERPL-SCNC: 3.5 MMOL/L (ref 3.5–5.1)
RBC # BLD AUTO: 3.76 M/UL (ref 4–5.2)
SAO2 % BLDV: 95 %
SODIUM SERPL-SCNC: 131 MMOL/L (ref 136–145)
WBC # BLD AUTO: 8.6 K/UL (ref 4–11)

## 2024-01-19 PROCEDURE — 82803 BLOOD GASES ANY COMBINATION: CPT

## 2024-01-19 PROCEDURE — 6370000000 HC RX 637 (ALT 250 FOR IP): Performed by: STUDENT IN AN ORGANIZED HEALTH CARE EDUCATION/TRAINING PROGRAM

## 2024-01-19 PROCEDURE — 83735 ASSAY OF MAGNESIUM: CPT

## 2024-01-19 PROCEDURE — 97530 THERAPEUTIC ACTIVITIES: CPT

## 2024-01-19 PROCEDURE — 97116 GAIT TRAINING THERAPY: CPT

## 2024-01-19 PROCEDURE — 1200000000 HC SEMI PRIVATE

## 2024-01-19 PROCEDURE — 72100 X-RAY EXAM L-S SPINE 2/3 VWS: CPT

## 2024-01-19 PROCEDURE — 6370000000 HC RX 637 (ALT 250 FOR IP): Performed by: INTERNAL MEDICINE

## 2024-01-19 PROCEDURE — 2580000003 HC RX 258: Performed by: STUDENT IN AN ORGANIZED HEALTH CARE EDUCATION/TRAINING PROGRAM

## 2024-01-19 PROCEDURE — 83605 ASSAY OF LACTIC ACID: CPT

## 2024-01-19 PROCEDURE — 85025 COMPLETE CBC W/AUTO DIFF WBC: CPT

## 2024-01-19 PROCEDURE — 80069 RENAL FUNCTION PANEL: CPT

## 2024-01-19 PROCEDURE — 36415 COLL VENOUS BLD VENIPUNCTURE: CPT

## 2024-01-19 RX ADMIN — LEVOTHYROXINE SODIUM 100 MCG: 0.1 TABLET ORAL at 06:26

## 2024-01-19 RX ADMIN — METOPROLOL SUCCINATE 25 MG: 25 TABLET, EXTENDED RELEASE ORAL at 10:31

## 2024-01-19 RX ADMIN — ALLOPURINOL 50 MG: 100 TABLET ORAL at 10:31

## 2024-01-19 RX ADMIN — TRAMADOL HYDROCHLORIDE 25 MG: 50 TABLET, COATED ORAL at 10:30

## 2024-01-19 RX ADMIN — TORSEMIDE 20 MG: 20 TABLET ORAL at 10:34

## 2024-01-19 RX ADMIN — ACETAMINOPHEN 650 MG: 325 TABLET, FILM COATED ORAL at 10:32

## 2024-01-19 RX ADMIN — APIXABAN 2.5 MG: 2.5 TABLET, FILM COATED ORAL at 20:45

## 2024-01-19 RX ADMIN — CYCLOSPORINE 1 DROP: 0.5 EMULSION OPHTHALMIC at 20:45

## 2024-01-19 RX ADMIN — METHOCARBAMOL 500 MG: 500 TABLET ORAL at 06:28

## 2024-01-19 RX ADMIN — TAMSULOSIN HYDROCHLORIDE 0.4 MG: 0.4 CAPSULE ORAL at 10:29

## 2024-01-19 RX ADMIN — ACETAMINOPHEN 650 MG: 325 TABLET, FILM COATED ORAL at 21:51

## 2024-01-19 RX ADMIN — ATORVASTATIN CALCIUM 10 MG: 10 TABLET, FILM COATED ORAL at 10:32

## 2024-01-19 RX ADMIN — CYCLOSPORINE 1 DROP: 0.5 EMULSION OPHTHALMIC at 10:35

## 2024-01-19 RX ADMIN — ACETAMINOPHEN 650 MG: 325 TABLET, FILM COATED ORAL at 18:19

## 2024-01-19 RX ADMIN — APIXABAN 2.5 MG: 2.5 TABLET, FILM COATED ORAL at 10:32

## 2024-01-19 RX ADMIN — SODIUM CHLORIDE, PRESERVATIVE FREE 10 ML: 5 INJECTION INTRAVENOUS at 10:33

## 2024-01-19 ASSESSMENT — PAIN SCALES - GENERAL
PAINLEVEL_OUTOF10: 5

## 2024-01-19 ASSESSMENT — PAIN DESCRIPTION - LOCATION
LOCATION: BACK
LOCATION: LEG
LOCATION: BACK

## 2024-01-19 ASSESSMENT — PAIN DESCRIPTION - ORIENTATION
ORIENTATION: LEFT
ORIENTATION: LOWER
ORIENTATION: LOWER

## 2024-01-19 ASSESSMENT — PAIN DESCRIPTION - DESCRIPTORS
DESCRIPTORS: ACHING
DESCRIPTORS: ACHING;DISCOMFORT
DESCRIPTORS: ACHING

## 2024-01-19 NOTE — PLAN OF CARE
Problem: Skin/Tissue Integrity  Goal: Absence of new skin breakdown  Description: 1.  Monitor for areas of redness and/or skin breakdown  2.  Assess vascular access sites hourly  3.  Every 4-6 hours minimum:  Change oxygen saturation probe site  4.  Every 4-6 hours:  If on nasal continuous positive airway pressure, respiratory therapy assess nares and determine need for appliance change or resting period.  Outcome: Progressing     Problem: Safety - Adult  Goal: Free from fall injury  Outcome: Progressing  Flowsheets (Taken 1/18/2024 2153)  Free From Fall Injury: Instruct family/caregiver on patient safety     Problem: ABCDS Injury Assessment  Goal: Absence of physical injury  Outcome: Progressing  Flowsheets (Taken 1/18/2024 2153)  Absence of Physical Injury: Implement safety measures based on patient assessment     Problem: Pain  Goal: Verbalizes/displays adequate comfort level or baseline comfort level  Outcome: Progressing  Flowsheets (Taken 1/18/2024 2153)  Verbalizes/displays adequate comfort level or baseline comfort level:   Encourage patient to monitor pain and request assistance   Assess pain using appropriate pain scale   Administer analgesics based on type and severity of pain and evaluate response   Implement non-pharmacological measures as appropriate and evaluate response

## 2024-01-20 LAB
ANION GAP SERPL CALCULATED.3IONS-SCNC: 10 MMOL/L (ref 3–16)
BUN SERPL-MCNC: 24 MG/DL (ref 7–20)
CALCIUM SERPL-MCNC: 9.5 MG/DL (ref 8.3–10.6)
CHLORIDE SERPL-SCNC: 91 MMOL/L (ref 99–110)
CO2 SERPL-SCNC: 28 MMOL/L (ref 21–32)
CREAT SERPL-MCNC: 1.3 MG/DL (ref 0.6–1.2)
GFR SERPLBLD CREATININE-BSD FMLA CKD-EPI: 38 ML/MIN/{1.73_M2}
GLUCOSE SERPL-MCNC: 108 MG/DL (ref 70–99)
MAGNESIUM SERPL-MCNC: 2.6 MG/DL (ref 1.8–2.4)
POTASSIUM SERPL-SCNC: 3.8 MMOL/L (ref 3.5–5.1)
SODIUM SERPL-SCNC: 129 MMOL/L (ref 136–145)

## 2024-01-20 PROCEDURE — 6360000002 HC RX W HCPCS: Performed by: INTERNAL MEDICINE

## 2024-01-20 PROCEDURE — 6370000000 HC RX 637 (ALT 250 FOR IP): Performed by: STUDENT IN AN ORGANIZED HEALTH CARE EDUCATION/TRAINING PROGRAM

## 2024-01-20 PROCEDURE — 36415 COLL VENOUS BLD VENIPUNCTURE: CPT

## 2024-01-20 PROCEDURE — 83735 ASSAY OF MAGNESIUM: CPT

## 2024-01-20 PROCEDURE — 80048 BASIC METABOLIC PNL TOTAL CA: CPT

## 2024-01-20 PROCEDURE — 1200000000 HC SEMI PRIVATE

## 2024-01-20 PROCEDURE — 6370000000 HC RX 637 (ALT 250 FOR IP): Performed by: INTERNAL MEDICINE

## 2024-01-20 PROCEDURE — 2580000003 HC RX 258: Performed by: STUDENT IN AN ORGANIZED HEALTH CARE EDUCATION/TRAINING PROGRAM

## 2024-01-20 RX ORDER — SENNA AND DOCUSATE SODIUM 50; 8.6 MG/1; MG/1
2 TABLET, FILM COATED ORAL DAILY
Qty: 60 TABLET | Refills: 0
Start: 2024-01-21

## 2024-01-20 RX ORDER — MAGNESIUM SULFATE IN WATER 40 MG/ML
2000 INJECTION, SOLUTION INTRAVENOUS ONCE
Status: COMPLETED | OUTPATIENT
Start: 2024-01-20 | End: 2024-01-20

## 2024-01-20 RX ORDER — METHOCARBAMOL 500 MG/1
500 TABLET, FILM COATED ORAL 3 TIMES DAILY PRN
Qty: 30 TABLET | Refills: 0
Start: 2024-01-20 | End: 2024-01-30

## 2024-01-20 RX ORDER — TRAMADOL HYDROCHLORIDE 50 MG/1
12.5 TABLET ORAL EVERY 6 HOURS PRN
Qty: 10 TABLET | Refills: 0 | Status: SHIPPED | OUTPATIENT
Start: 2024-01-20 | End: 2024-01-25

## 2024-01-20 RX ADMIN — TORSEMIDE 20 MG: 20 TABLET ORAL at 08:45

## 2024-01-20 RX ADMIN — ACETAMINOPHEN 650 MG: 325 TABLET, FILM COATED ORAL at 08:45

## 2024-01-20 RX ADMIN — DOCUSATE SODIUM AND SENNOSIDES 2 TABLET: 8.6; 5 TABLET, FILM COATED ORAL at 08:45

## 2024-01-20 RX ADMIN — METHOCARBAMOL 500 MG: 500 TABLET ORAL at 20:08

## 2024-01-20 RX ADMIN — SODIUM CHLORIDE, PRESERVATIVE FREE 10 ML: 5 INJECTION INTRAVENOUS at 08:46

## 2024-01-20 RX ADMIN — CYCLOSPORINE 1 DROP: 0.5 EMULSION OPHTHALMIC at 20:08

## 2024-01-20 RX ADMIN — ALLOPURINOL 50 MG: 100 TABLET ORAL at 08:45

## 2024-01-20 RX ADMIN — TAMSULOSIN HYDROCHLORIDE 0.4 MG: 0.4 CAPSULE ORAL at 08:45

## 2024-01-20 RX ADMIN — TRAMADOL HYDROCHLORIDE 25 MG: 50 TABLET, COATED ORAL at 20:07

## 2024-01-20 RX ADMIN — LEVOTHYROXINE SODIUM 100 MCG: 0.1 TABLET ORAL at 06:33

## 2024-01-20 RX ADMIN — APIXABAN 2.5 MG: 2.5 TABLET, FILM COATED ORAL at 08:45

## 2024-01-20 RX ADMIN — ACETAMINOPHEN 650 MG: 325 TABLET, FILM COATED ORAL at 20:08

## 2024-01-20 RX ADMIN — SODIUM CHLORIDE, PRESERVATIVE FREE 10 ML: 5 INJECTION INTRAVENOUS at 20:08

## 2024-01-20 RX ADMIN — CYCLOSPORINE 1 DROP: 0.5 EMULSION OPHTHALMIC at 08:55

## 2024-01-20 RX ADMIN — ATORVASTATIN CALCIUM 10 MG: 10 TABLET, FILM COATED ORAL at 08:45

## 2024-01-20 RX ADMIN — APIXABAN 2.5 MG: 2.5 TABLET, FILM COATED ORAL at 20:08

## 2024-01-20 RX ADMIN — METOPROLOL SUCCINATE 25 MG: 25 TABLET, EXTENDED RELEASE ORAL at 08:45

## 2024-01-20 RX ADMIN — POTASSIUM BICARBONATE 40 MEQ: 782 TABLET, EFFERVESCENT ORAL at 10:41

## 2024-01-20 RX ADMIN — ACETAMINOPHEN 650 MG: 325 TABLET, FILM COATED ORAL at 16:08

## 2024-01-20 RX ADMIN — MAGNESIUM SULFATE HEPTAHYDRATE 2000 MG: 40 INJECTION, SOLUTION INTRAVENOUS at 10:58

## 2024-01-20 ASSESSMENT — PAIN DESCRIPTION - ORIENTATION: ORIENTATION: LOWER

## 2024-01-20 ASSESSMENT — PAIN SCALES - GENERAL
PAINLEVEL_OUTOF10: 0
PAINLEVEL_OUTOF10: 10
PAINLEVEL_OUTOF10: 3

## 2024-01-20 ASSESSMENT — PAIN - FUNCTIONAL ASSESSMENT: PAIN_FUNCTIONAL_ASSESSMENT: ACTIVITIES ARE NOT PREVENTED

## 2024-01-20 ASSESSMENT — PAIN DESCRIPTION - LOCATION
LOCATION: BACK
LOCATION: BACK

## 2024-01-20 ASSESSMENT — PAIN DESCRIPTION - DESCRIPTORS
DESCRIPTORS: SORE
DESCRIPTORS: SORE

## 2024-01-20 NOTE — PLAN OF CARE
Problem: Skin/Tissue Integrity  Goal: Absence of new skin breakdown  Description: 1.  Monitor for areas of redness and/or skin breakdown  2.  Assess vascular access sites hourly  3.  Every 4-6 hours minimum:  Change oxygen saturation probe site  4.  Every 4-6 hours:  If on nasal continuous positive airway pressure, respiratory therapy assess nares and determine need for appliance change or resting period.  Outcome: Progressing     Problem: Safety - Adult  Goal: Free from fall injury  Outcome: Progressing     Problem: ABCDS Injury Assessment  Goal: Absence of physical injury  Outcome: Progressing     Problem: Pain  Goal: Verbalizes/displays adequate comfort level or baseline comfort level  Outcome: Progressing     Problem: Chronic Conditions and Co-morbidities  Goal: Patient's chronic conditions and co-morbidity symptoms are monitored and maintained or improved  Outcome: Progressing  Flowsheets (Taken 1/19/2024 1050 by Vonda Bourgeois RN)  Care Plan - Patient's Chronic Conditions and Co-Morbidity Symptoms are Monitored and Maintained or Improved:   Monitor and assess patient's chronic conditions and comorbid symptoms for stability, deterioration, or improvement   Collaborate with multidisciplinary team to address chronic and comorbid conditions and prevent exacerbation or deterioration   Update acute care plan with appropriate goals if chronic or comorbid symptoms are exacerbated and prevent overall improvement and discharge

## 2024-01-21 VITALS
DIASTOLIC BLOOD PRESSURE: 74 MMHG | HEART RATE: 94 BPM | HEIGHT: 60 IN | WEIGHT: 156.75 LBS | OXYGEN SATURATION: 92 % | SYSTOLIC BLOOD PRESSURE: 112 MMHG | BODY MASS INDEX: 30.77 KG/M2 | TEMPERATURE: 97.5 F | RESPIRATION RATE: 18 BRPM

## 2024-01-21 PROCEDURE — 2580000003 HC RX 258: Performed by: STUDENT IN AN ORGANIZED HEALTH CARE EDUCATION/TRAINING PROGRAM

## 2024-01-21 PROCEDURE — 6370000000 HC RX 637 (ALT 250 FOR IP): Performed by: STUDENT IN AN ORGANIZED HEALTH CARE EDUCATION/TRAINING PROGRAM

## 2024-01-21 RX ORDER — GRANULES FOR ORAL 3 G/1
3 POWDER ORAL WEEKLY
Qty: 5 EACH | Refills: 0
Start: 2024-01-21

## 2024-01-21 RX ORDER — MAGNESIUM OXIDE 400 MG/1
400 TABLET ORAL DAILY
Qty: 10 TABLET | Refills: 0
Start: 2024-01-21

## 2024-01-21 RX ORDER — POTASSIUM CHLORIDE 20 MEQ/1
20 TABLET, EXTENDED RELEASE ORAL DAILY
Qty: 10 TABLET | Refills: 0
Start: 2024-01-21

## 2024-01-21 RX ADMIN — LEVOTHYROXINE SODIUM 100 MCG: 0.1 TABLET ORAL at 05:39

## 2024-01-21 RX ADMIN — ATORVASTATIN CALCIUM 10 MG: 10 TABLET, FILM COATED ORAL at 08:37

## 2024-01-21 RX ADMIN — SODIUM CHLORIDE, PRESERVATIVE FREE 10 ML: 5 INJECTION INTRAVENOUS at 08:38

## 2024-01-21 RX ADMIN — DOCUSATE SODIUM AND SENNOSIDES 2 TABLET: 8.6; 5 TABLET, FILM COATED ORAL at 08:36

## 2024-01-21 RX ADMIN — ACETAMINOPHEN 650 MG: 325 TABLET, FILM COATED ORAL at 08:36

## 2024-01-21 RX ADMIN — TAMSULOSIN HYDROCHLORIDE 0.4 MG: 0.4 CAPSULE ORAL at 08:37

## 2024-01-21 RX ADMIN — ALLOPURINOL 50 MG: 100 TABLET ORAL at 08:37

## 2024-01-21 RX ADMIN — METOPROLOL SUCCINATE 25 MG: 25 TABLET, EXTENDED RELEASE ORAL at 08:37

## 2024-01-21 RX ADMIN — ACETAMINOPHEN 650 MG: 325 TABLET, FILM COATED ORAL at 13:03

## 2024-01-21 RX ADMIN — TORSEMIDE 20 MG: 20 TABLET ORAL at 08:37

## 2024-01-21 RX ADMIN — CYCLOSPORINE 1 DROP: 0.5 EMULSION OPHTHALMIC at 08:38

## 2024-01-21 RX ADMIN — APIXABAN 2.5 MG: 2.5 TABLET, FILM COATED ORAL at 08:37

## 2024-01-21 ASSESSMENT — PAIN DESCRIPTION - DESCRIPTORS
DESCRIPTORS: DISCOMFORT
DESCRIPTORS: ACHING

## 2024-01-21 ASSESSMENT — PAIN DESCRIPTION - ORIENTATION
ORIENTATION: LOWER
ORIENTATION: LOWER

## 2024-01-21 ASSESSMENT — PAIN SCALES - GENERAL
PAINLEVEL_OUTOF10: 3
PAINLEVEL_OUTOF10: 3

## 2024-01-21 ASSESSMENT — PAIN DESCRIPTION - LOCATION: LOCATION: BACK

## 2024-01-21 NOTE — DISCHARGE INSTR - COC
Continuity of Care Form    Patient Name: Kamala Carpenter   :  1930  MRN:  [id number]    Admit date:  2024  Discharge date:  2024      Code Status Order: Full Code   Advance Directives:     Admitting Physician:  Laci Montgomery MD  PCP: Narinder Ceballos MD    Discharging Nurse: [facility] 0327/0327-01  Discharging Unit Phone Number: [phone]      Emergency Contact:   Extended Emergency Contact Information  Primary Emergency Contact: Aleena Marie  Home Phone: [phone]  Relation: Child  Preferred language: English   needed? No  Secondary Emergency Contact: MarieJason   [facility]  Home Phone: [phone]  Mobile Phone: [phone]  Relation: Grandchild    Past Surgical History:  Past Surgical History:   Procedure Laterality Date    APPENDECTOMY      BREAST SURGERY      biopsy benign    CARDIAC SURGERY      stent    CATARACT REMOVAL WITH IMPLANT Right 10/11/2017    COLONOSCOPY  2013    HYSTERECTOMY (CERVIX STATUS UNKNOWN)      JOINT REPLACEMENT Left 14    LEFT TOTAL KNEE REPLACEMENT WITH FEMORAL NERVE BLOCK FOR PAIN    OTHER SURGICAL HISTORY Right 14    right total knee replacement    TONSILLECTOMY         Immunization History:   Immunization History   Administered Date(s) Administered    COVID-19, PFIZER GRAY top, DO NOT Dilute, (age 12 y+), IM, 30 mcg/0.3 mL 2022    COVID-19, PFIZER PURPLE top, DILUTE for use, (age 12 y+), 30mcg/0.3mL 2021, 2021, 2021       Active Problems:  Patient Active Problem List   Diagnosis Code    Anginal pain (HCC) I20.9    CAD (coronary artery disease) I25.10    Dyspnea on exertion R06.09    Primary localized osteoarthrosis, lower leg M17.10    Left TKR Z96.659    HTN (hypertension) I10    Hyperlipidemia E78.5    Right TKR Z96.659    Trochanteric bursitis of right hip M70.61    Syncope and collapse R55    Acute coronary syndrome (HCC) I24.9    Paroxysmal

## 2024-01-21 NOTE — PLAN OF CARE
Problem: Skin/Tissue Integrity  Goal: Absence of new skin breakdown  Description: 1.  Monitor for areas of redness and/or skin breakdown  2.  Assess vascular access sites hourly  3.  Every 4-6 hours minimum:  Change oxygen saturation probe site  4.  Every 4-6 hours:  If on nasal continuous positive airway pressure, respiratory therapy assess nares and determine need for appliance change or resting period.  Outcome: Progressing     Problem: Safety - Adult  Goal: Free from fall injury  Outcome: Progressing     Problem: ABCDS Injury Assessment  Goal: Absence of physical injury  Outcome: Progressing     Problem: Pain  Goal: Verbalizes/displays adequate comfort level or baseline comfort level  Outcome: Progressing     Problem: Chronic Conditions and Co-morbidities  Goal: Patient's chronic conditions and co-morbidity symptoms are monitored and maintained or improved  Outcome: Progressing

## 2024-01-21 NOTE — CARE COORDINATION
CASE MANAGEMENT DISCHARGE SUMMARY      Discharge to: [facility] skilled    Precertification completed: yes  Hospital Exemption Notification (HENS) completed: yes    IMM given: (date)     New Durable Medical Equipment ordered/agency: na    Transportation:    Medical Transport explained to pt/family. Pt/family voice no agency preference.    Agency used: [facility]   time: 1330   Ambulance form completed: Yes    Confirmed discharge plan with: RN, [facility], daughter- Aleena      Patient: yes/no     Family:  yes/no    Name: Contact number:     Facility/Agency, name:  [facility] faxed [phone]   Phone number for report to facility: [phone]     RN, name: Kimberlyn    Note: Discharging nurse to complete BRIAN, reconcile AVS, and place final copy with patient's discharge packet. RN to ensure that written prescriptions for  Level II medications are sent with patient to the facility as per protocol.       
    Cert obtained see above. Spoke with Dtr aware approval obtained. DC pending XR with TSLO brace.  Patient will need medical transport .ASIM Velazquez    
Writer received vmail from [facility], they are not in network with pt's insurance and cannot accept.  CM will get next SNF choice.  CEDRIC Murillo     6228 Addendum:  Writer met with daughter at bedside, reviewed [facility] SNF list and Medicare ratings, she asked for referrals to [facility] and [facility].  Writer sent referrals, awaiting PT/OT evals.  CEDRIC Murillo     0987 Addendum:  [facility] is out network, per Leyla/admissions.  [facility] can accept once precert approved, CM will start when PT/OT notes in.  CEDRIC Murillo     3379 Addendum:  Writer updated pt's daughter on plan for [facility], she agrees and is appreciative.  PT/OT working with pt now, CM will start precert when notes done.  CEDRIC Murillo     
the discharge plan with any other family members/significant others, and if so, who? Yes (DTR)  Plans to Return to Present Housing: Unknown at present  Potential Assistance needed at discharge: Skilled Nursing Facility, Home Care  Patient expects to discharge to: Apartment  Plan for transportation at discharge: Family    Financial    Payor: [employer] MEDICARE / Plan: [place] MEDIBLUE ESSENTIAL/PLUS / Product Type: *No Product type* /     Does insurance require precert for SNF: Yes    Potential assistance Purchasing Medications: No  Meds-to-Beds request: Yes      [id number] - [place], [place] - [address] - P [phone] - F [phone] [address] [address]  Phone: [phone] Fax: [phone]    [facility] - [facility] [address] - P [phone] - F [phone]  [address] [facility] [address]  Phone: [phone] Fax: [phone]    [id number] - [place], [place] - [address] - P [phone] - F [phone]  [address]  [facility] [address]  Phone: [phone] Fax: [phone]      Notes:    Factors facilitating achievement of predicted outcomes: Family support, Cooperative, and Pleasant    Barriers to discharge: Pain, Upper extremity weakness, Lower extremity weakness, and Medical complications    Additional Case Management Notes: Patient reports lives in a second floor apartment( elevator access) alone. Patient with supportive dtr whom has been checking on 2x per day for the last 6 weeks. Patient active with [facility] and [facility] services. Patient feels like at discharge will need SNF placement as weaker/more pain than baseline. Patient with pending imaging and then possible therapy evals. Referral sent to [facility] per family request. Spoke with Rogelio will review. Writer also provided care patrol info for adeline down the road needs.       The Patient and/or Patient Representative

## 2024-01-21 NOTE — DISCHARGE SUMMARY
Discharge Summary    Name:  Kamala Carpenter /Age/Sex: 1930  (89 y.o. female)   MRN & CSN:  [id number] & [id number] Admission Date/Time: 2024  6:43 AM   Attending:  Liliana Jackson MD Discharging Physician: Liliana Jackson MD     Discharge diagnosis and plan:    Acute L1 and L2 fracture, osteoporotic  No history of fall. Suspect osteoporotic in etiology.  Noted on CT scan. Neurosurgery consulted.  Strict bedrest continued at admission.  MRI done - showed acute L1 and L2 fracture. TLSO brace delivered. PT/OT started once brace was available. Upright Xray spine with brace done - alignment stable. Pre-cert received for EGS. Low dose tramadol continued- tends to get hallucination at times - tolerating well. Scheduled tylenol to be continued.      Nonsustained V. Tach, resolved  6-8 beats nonsustained ventricular tachycardia overnight x2. Replaced magnesium and potassium. No recurrence since.      Mild hyponatremia  -Resolved with oral hydration.     Hyperlipidemia  -Continue Lipitor.     Atrial fibrillation  -Continue home metoprolol and Eliquis.     Hypothyroidism  -Continue home Synthroid.     Irritable bowel syndrome  -Continue Linzess     CKD stage III  -Monitor with labs.  Avoid nephrotoxins     HTN - no evidence of active signs/sxs of ischemia/failure. Currently controlled on home meds w/ vitals documented and reviewed.       Discharge Exam  /74   Pulse 94   Temp 97.5 °F (36.4 °C) (Oral)   Resp 18   Ht 1.524 m (5')   Wt 71.1 kg (156 lb 12 oz)   SpO2 92%   BMI 30.61 kg/m²     Physical Exam  General: NAD,  Eyes: EOMI  ENT: neck supple  Cardiovascular: Regular rate.  Respiratory: Clear to auscultation  Gastrointestinal: Soft, non tender  Genitourinary: no suprapubic tenderness  Musculoskeletal: No edema.  Lumbar tenderness to palpation.  Negative straight leg raise test.  Skin: warm, dry  Neuro: Alert.  Psych: Mood appropriate.     Hospital Course:   Kamala Carpenter is a 89 y.o.  female  who

## 2024-08-18 ENCOUNTER — HOSPITAL ENCOUNTER (EMERGENCY)
Age: 89
Discharge: HOME | End: 2024-08-19
Payer: MEDICARE

## 2024-08-18 DIAGNOSIS — N39.0 PSEUDOMONAS URINARY TRACT INFECTION: Primary | ICD-10-CM

## 2024-08-18 DIAGNOSIS — B96.5 PSEUDOMONAS URINARY TRACT INFECTION: Primary | ICD-10-CM

## 2024-08-18 LAB
ANION GAP SERPL CALCULATED.3IONS-SCNC: 11 MMOL/L (ref 3–16)
BACTERIA URNS QL MICRO: ABNORMAL /HPF
BASOPHILS # BLD: 0 K/UL (ref 0–0.2)
BASOPHILS NFR BLD: 0.8 %
BILIRUB UR QL STRIP.AUTO: NEGATIVE
BUN SERPL-MCNC: 31 MG/DL (ref 7–20)
CALCIUM SERPL-MCNC: 9.8 MG/DL (ref 8.3–10.6)
CHLORIDE SERPL-SCNC: 98 MMOL/L (ref 99–110)
CLARITY UR: CLEAR
CO2 SERPL-SCNC: 26 MMOL/L (ref 21–32)
COLOR UR: ABNORMAL
CREAT SERPL-MCNC: 1.2 MG/DL (ref 0.6–1.2)
DEPRECATED RDW RBC AUTO: 16.7 % (ref 12.4–15.4)
EOSINOPHIL # BLD: 0.1 K/UL (ref 0–0.6)
EOSINOPHIL NFR BLD: 2.6 %
GFR SERPLBLD CREATININE-BSD FMLA CKD-EPI: 42 ML/MIN/{1.73_M2}
GLUCOSE SERPL-MCNC: 101 MG/DL (ref 70–99)
GLUCOSE UR STRIP.AUTO-MCNC: NEGATIVE MG/DL
HCT VFR BLD AUTO: 32.3 % (ref 36–48)
HGB BLD-MCNC: 10.5 G/DL (ref 12–16)
HGB UR QL STRIP.AUTO: NEGATIVE
KETONES UR STRIP.AUTO-MCNC: NEGATIVE MG/DL
LACTATE BLDV-SCNC: 0.7 MMOL/L (ref 0.4–1.9)
LEUKOCYTE ESTERASE UR QL STRIP.AUTO: ABNORMAL
LYMPHOCYTES # BLD: 1.6 K/UL (ref 1–5.1)
LYMPHOCYTES NFR BLD: 31 %
MCH RBC QN AUTO: 30.8 PG (ref 26–34)
MCHC RBC AUTO-ENTMCNC: 32.4 G/DL (ref 31–36)
MCV RBC AUTO: 94.9 FL (ref 80–100)
MONOCYTES # BLD: 0.6 K/UL (ref 0–1.3)
MONOCYTES NFR BLD: 11.6 %
NEUTROPHILS # BLD: 2.9 K/UL (ref 1.7–7.7)
NEUTROPHILS NFR BLD: 54 %
NITRITE UR QL STRIP.AUTO: NEGATIVE
PH UR STRIP.AUTO: 6 [PH] (ref 5–8)
PLATELET # BLD AUTO: 122 K/UL (ref 135–450)
PMV BLD AUTO: 9.5 FL (ref 5–10.5)
POTASSIUM SERPL-SCNC: 3.8 MMOL/L (ref 3.5–5.1)
PROT UR STRIP.AUTO-MCNC: NEGATIVE MG/DL
RBC # BLD AUTO: 3.4 M/UL (ref 4–5.2)
RBC #/AREA URNS HPF: ABNORMAL /HPF (ref 0–4)
SODIUM SERPL-SCNC: 135 MMOL/L (ref 136–145)
SP GR UR STRIP.AUTO: 1.01 (ref 1–1.03)
UA COMPLETE W REFLEX CULTURE PNL UR: ABNORMAL
UA DIPSTICK W REFLEX MICRO PNL UR: YES
URN SPEC COLLECT METH UR: ABNORMAL
UROBILINOGEN UR STRIP-ACNC: 0.2 E.U./DL
WBC # BLD AUTO: 5.3 K/UL (ref 4–11)
WBC #/AREA URNS HPF: ABNORMAL /HPF (ref 0–5)

## 2024-08-18 PROCEDURE — 85025 COMPLETE CBC W/AUTO DIFF WBC: CPT

## 2024-08-18 PROCEDURE — 6370000000 HC RX 637 (ALT 250 FOR IP): Performed by: PHYSICIAN ASSISTANT

## 2024-08-18 PROCEDURE — 36415 COLL VENOUS BLD VENIPUNCTURE: CPT

## 2024-08-18 PROCEDURE — 80048 BASIC METABOLIC PNL TOTAL CA: CPT

## 2024-08-18 PROCEDURE — 83605 ASSAY OF LACTIC ACID: CPT

## 2024-08-18 PROCEDURE — 99283 EMERGENCY DEPT VISIT LOW MDM: CPT

## 2024-08-18 PROCEDURE — 81001 URINALYSIS AUTO W/SCOPE: CPT

## 2024-08-18 RX ORDER — CETIRIZINE HYDROCHLORIDE 10 MG/1
5 TABLET ORAL ONCE
Status: DISCONTINUED | OUTPATIENT
Start: 2024-08-18 | End: 2024-08-18

## 2024-08-18 RX ORDER — LEVOFLOXACIN 500 MG/1
250 TABLET, FILM COATED ORAL ONCE
Status: COMPLETED | OUTPATIENT
Start: 2024-08-18 | End: 2024-08-18

## 2024-08-18 RX ORDER — PHENAZOPYRIDINE HYDROCHLORIDE 100 MG/1
100 TABLET, FILM COATED ORAL ONCE
Status: COMPLETED | OUTPATIENT
Start: 2024-08-18 | End: 2024-08-18

## 2024-08-18 RX ORDER — LEVOFLOXACIN 500 MG/1
500 TABLET, FILM COATED ORAL DAILY
Status: DISCONTINUED | OUTPATIENT
Start: 2024-08-19 | End: 2024-08-18

## 2024-08-18 RX ADMIN — PHENAZOPYRIDINE 100 MG: 100 TABLET ORAL at 23:21

## 2024-08-18 RX ADMIN — LEVOFLOXACIN 250 MG: 500 TABLET, FILM COATED ORAL at 23:21

## 2024-08-18 ASSESSMENT — PAIN - FUNCTIONAL ASSESSMENT: PAIN_FUNCTIONAL_ASSESSMENT: NONE - DENIES PAIN

## 2024-08-19 VITALS
OXYGEN SATURATION: 97 % | SYSTOLIC BLOOD PRESSURE: 131 MMHG | TEMPERATURE: 98.6 F | DIASTOLIC BLOOD PRESSURE: 69 MMHG | HEART RATE: 82 BPM | RESPIRATION RATE: 16 BRPM

## 2024-08-19 RX ORDER — PHENAZOPYRIDINE HYDROCHLORIDE 100 MG/1
100 TABLET, FILM COATED ORAL EVERY 8 HOURS PRN
Qty: 6 TABLET | Refills: 0 | Status: SHIPPED | OUTPATIENT
Start: 2024-08-19

## 2024-08-19 RX ORDER — LEVOFLOXACIN 250 MG/1
250 TABLET, FILM COATED ORAL DAILY
Qty: 7 TABLET | Refills: 0 | Status: SHIPPED | OUTPATIENT
Start: 2024-08-19 | End: 2024-08-26

## 2024-08-19 NOTE — DISCHARGE INSTRUCTIONS
You were referred to ED with Pseudomonas UTI as noted on the culture report from a few days ago.  You were sent sent to ED for IV consideration.  However sensitivity to Levaquin was noted.  After discussion was agreeable jointly to initiate the Levaquin 250 mg and first dose given in ED.  No adverse effect.  If facial rash or itching occurs I recommend OTC hydrocortisone cream about 3 times a day or utilize Zyrtec 5 mg or Xyzal 2.5 mg as antihistamine.  This is a new generation antihistamine and would likely not have similar adverse events as related to Benadryl.  I would like you to discuss that with pharmacy should you choose to utilize 1 of these products.  In the meantime maintain good hydration.  Levaquin 250 mg #7 taking 1 tablet daily sent to local pharmacy along with Pyridium 100 mg you may take this every 8 hours to control to symptoms.

## 2024-08-19 NOTE — ED PROVIDER NOTES
[facility]  ED  EMERGENCY DEPARTMENT ENCOUNTER        Pt Name: Kamala Carpenter  MRN: [id number]  Birthdate 8/25/1930  Date of evaluation: 8/18/2024  Provider: Zev Lopez PA-C  PCP: Narinder Ceballos MD  Note Started: 11:59 PM EDT 8/18/24      NESTOR. I have evaluated this patient.        CHIEF COMPLAINT       Chief Complaint   Patient presents with    Urinary Frequency     Per daughter, \"she has a UTI and per her doctor she needs IV antibiotics\"       HISTORY OF PRESENT ILLNESS: 1 or more Elements     History From: Patient and daughter    Kamala Carpenter is a 89 y.o. female who presents to Emergency Department with complaint UTI requiring intravenous antibiotics.  The patient had onset of urinary symptoms nearly a week ago.  Recent culture showed Pseudomonas aeruginosa.  Prior to the resulting of the culture patient was put on Omnicef 10 mg twice daily for 5 days.  I did review the sensitivity report and the species is sensitive to Levaquin as well as meropenem.  I do suspect PCP was thinking about using meropenem however the patient need to be admitted and discharged with IV line such as midline or PICC catheter.  The patient has had sensitivity to Cipro causing a facial rash and some itching.  I did discuss with the daughter switching over to Levaquin 250 mg daily for 1 week.  She was agreeable to try the first dose here in the ED.  This patient is not having any flank pain, fever or chills.  Patient does indicate urinary frequency and urgency.  No dysuria is being reported.  It is noted on the patient medication she she does take Monuril every Tuesday.  However the product Monuril would not be a good choice for treating the Pseudomonas UTI.    Nursing Notes were all reviewed and agreed with or any disagreements were addressed in the HPI.    REVIEW OF SYSTEMS :      Review of Systems    Positives and Pertinent negatives as per HPI.     SURGICAL HISTORY     Past Surgical History: